# Patient Record
Sex: FEMALE | Race: WHITE | Employment: PART TIME | URBAN - METROPOLITAN AREA
[De-identification: names, ages, dates, MRNs, and addresses within clinical notes are randomized per-mention and may not be internally consistent; named-entity substitution may affect disease eponyms.]

---

## 2018-05-21 ENCOUNTER — OFFICE VISIT (OUTPATIENT)
Dept: MEDICAL GROUP | Facility: PHYSICIAN GROUP | Age: 73
End: 2018-05-21
Payer: MEDICARE

## 2018-05-21 VITALS
HEART RATE: 60 BPM | HEIGHT: 61 IN | DIASTOLIC BLOOD PRESSURE: 80 MMHG | OXYGEN SATURATION: 94 % | RESPIRATION RATE: 12 BRPM | TEMPERATURE: 97.6 F | SYSTOLIC BLOOD PRESSURE: 138 MMHG

## 2018-05-21 DIAGNOSIS — E78.2 MIXED HYPERLIPIDEMIA: ICD-10-CM

## 2018-05-21 DIAGNOSIS — I10 BENIGN ESSENTIAL HTN: ICD-10-CM

## 2018-05-21 DIAGNOSIS — E11.9 CONTROLLED TYPE 2 DIABETES MELLITUS WITHOUT COMPLICATION, WITHOUT LONG-TERM CURRENT USE OF INSULIN (HCC): ICD-10-CM

## 2018-05-21 DIAGNOSIS — I15.9 SECONDARY HYPERTENSION: ICD-10-CM

## 2018-05-21 PROCEDURE — 99203 OFFICE O/P NEW LOW 30 MIN: CPT | Performed by: FAMILY MEDICINE

## 2018-05-21 RX ORDER — ATENOLOL 50 MG/1
100 TABLET ORAL DAILY
Qty: 90 TAB | Refills: 1 | Status: SHIPPED | OUTPATIENT
Start: 2018-05-21 | End: 2018-05-21 | Stop reason: SDUPTHER

## 2018-05-21 RX ORDER — AMLODIPINE BESYLATE 10 MG/1
TABLET ORAL
COMMUNITY
Start: 2018-04-25 | End: 2018-07-17 | Stop reason: SDUPTHER

## 2018-05-21 RX ORDER — ATENOLOL 50 MG/1
100 TABLET ORAL DAILY
Qty: 90 TAB | Refills: 1 | Status: SHIPPED | OUTPATIENT
Start: 2018-05-21 | End: 2018-07-17 | Stop reason: SDUPTHER

## 2018-05-21 RX ORDER — PRAVASTATIN SODIUM 10 MG
TABLET ORAL
COMMUNITY
Start: 2018-04-25 | End: 2018-07-17 | Stop reason: SDUPTHER

## 2018-05-21 RX ORDER — LISINOPRIL 40 MG/1
TABLET ORAL
COMMUNITY
Start: 2018-04-25 | End: 2018-07-17 | Stop reason: SDUPTHER

## 2018-05-21 RX ORDER — GLIMEPIRIDE 4 MG/1
TABLET ORAL
COMMUNITY
Start: 2018-04-25 | End: 2018-07-17 | Stop reason: SDUPTHER

## 2018-05-21 ASSESSMENT — ENCOUNTER SYMPTOMS
HEADACHES: 0
NEUROLOGICAL NEGATIVE: 1
NECK PAIN: 0
PSYCHIATRIC NEGATIVE: 1
MUSCULOSKELETAL NEGATIVE: 1
HEMOPTYSIS: 0
RESPIRATORY NEGATIVE: 1
PALPITATIONS: 0
CONSTIPATION: 0
COUGH: 0
GASTROINTESTINAL NEGATIVE: 1
DIZZINESS: 0
CONSTITUTIONAL NEGATIVE: 1
CARDIOVASCULAR NEGATIVE: 1
FEVER: 0
CHILLS: 0
MYALGIAS: 0
EYES NEGATIVE: 1

## 2018-05-21 ASSESSMENT — PATIENT HEALTH QUESTIONNAIRE - PHQ9: CLINICAL INTERPRETATION OF PHQ2 SCORE: 0

## 2018-05-21 NOTE — LETTER
[facility]  Gonzalo Rodriguez M.D.  [address]  [facility] [address]  Fax: [phone]   Authorization for Release/Disclosure of   Protected Health Information   Name: ZENAIDA LUISENEYLAILA : 1945 SSN: [id number]   Address: [address] [place] [address]   [facility] [address] Phone:    [phone] (home)    I authorize the entity listed below to release/disclose the PHI below to:   [facility]/Gonzalo Rodriguez M.D. and Gonzalo Rodriguez M.D.   Provider or Entity Name:     Address   City, State, [facility]   Phone:      Fax:     Reason for request: continuity of care   Information to be released:    [  ] LAST COLONOSCOPY,  including any PATH REPORT and follow-up  [  ] LAST FIT/COLOGUARD RESULT [  ] LAST DEXA  [  ] LAST MAMMOGRAM  [  ] LAST PAP  [  ] LAST LABS [  ] RETINA EXAM REPORT  [  ] IMMUNIZATION RECORDS  [ X ] Release all info      [  ] Check here and initial the line next to each item to release ALL health information INCLUDING  _____ Care and treatment for drug and / or alcohol abuse  _____ HIV testing, infection status, or AIDS  _____ Genetic Testing    DATES OF SERVICE OR TIME PERIOD TO BE DISCLOSED: _____________  I understand and acknowledge that:  * This Authorization may be revoked at any time by you in writing, except if your health information has already been used or disclosed.  * Your health information that will be used or disclosed as a result of you signing this authorization could be re-disclosed by the recipient. If this occurs, your re-disclosed health information may no longer be protected by State or Federal laws.  * You may refuse to sign this Authorization. Your refusal will not affect your ability to obtain treatment.  * This Authorization becomes effective upon signing and will  on (date) __________.      If no date is indicated, this Authorization will  one (1) year from the signature date.    Name: Zenaida LuisCarol    Signature:   Date:     2018            PLEASE FAX REQUESTED RECORDS BACK TO: [phone]

## 2018-05-21 NOTE — PROGRESS NOTES
Subjective:      Zenaida Rios is a 72 y.o. female who presents with Diabetes            New patient visit, well controlled dm and htn ,needs new labs and ophth referral    1. Controlled type 2 diabetes mellitus without complication, without long-term current use of insulin (HCC)  Currently treated for DM, taking meds and checking bs at home, trying to do DM diet.controlled    - aspirin EC (ECOTRIN) 81 MG Tablet Delayed Response; Take 81 mg by mouth every day.  - glimepiride (AMARYL) 4 MG Tab;   - metFORMIN (GLUCOPHAGE) 850 MG Tab;   - amLODIPine (NORVASC) 10 MG Tab;   - lisinopril (PRINIVIL, ZESTRIL) 40 MG tablet;   - pravastatin (PRAVACHOL) 10 MG Tab;   - glucose blood strip; 1 Each by Other route as needed.  - COMP METABOLIC PANEL; Future  - FREE THYROXINE; Future  - HEMOGLOBIN A1C; Future  - LIPID PROFILE; Future  - MICROALBUMIN 24 HR URINE; Future  - TRIIDOTHYRONINE; Future  - TSH; Future  - VITAMIN D,25 HYDROXY; Future  - CBC WITHOUT DIFFERENTIAL; Future  - atenolol (TENORMIN) 50 MG Tab; Take 2 Tabs by mouth every day.  Dispense: 90 Tab; Refill: 1  - REFERRAL TO OPHTHALMOLOGY    2. Benign essential HTN  Currently treated for HTN, taking meds with no CP or sob, monitors bp at home periodically. controlled    - aspirin EC (ECOTRIN) 81 MG Tablet Delayed Response; Take 81 mg by mouth every day.  - glimepiride (AMARYL) 4 MG Tab;   - metFORMIN (GLUCOPHAGE) 850 MG Tab;   - amLODIPine (NORVASC) 10 MG Tab;   - lisinopril (PRINIVIL, ZESTRIL) 40 MG tablet;   - pravastatin (PRAVACHOL) 10 MG Tab;   - glucose blood strip; 1 Each by Other route as needed.  - COMP METABOLIC PANEL; Future  - FREE THYROXINE; Future  - HEMOGLOBIN A1C; Future  - LIPID PROFILE; Future  - MICROALBUMIN 24 HR URINE; Future  - TRIIDOTHYRONINE; Future  - TSH; Future  - VITAMIN D,25 HYDROXY; Future  - CBC WITHOUT DIFFERENTIAL; Future  - atenolol (TENORMIN) 50 MG Tab; Take 2 Tabs by mouth every day.  Dispense: 90 Tab; Refill: 1    3. Mixed  hyperlipidemia  Currently treated for HLD, taking meds with no new myalgias or joint pain, watching fats in diet  controlled    - aspirin EC (ECOTRIN) 81 MG Tablet Delayed Response; Take 81 mg by mouth every day.  - glimepiride (AMARYL) 4 MG Tab;   - metFORMIN (GLUCOPHAGE) 850 MG Tab;   - amLODIPine (NORVASC) 10 MG Tab;   - lisinopril (PRINIVIL, ZESTRIL) 40 MG tablet;   - pravastatin (PRAVACHOL) 10 MG Tab;   - glucose blood strip; 1 Each by Other route as needed.  - COMP METABOLIC PANEL; Future  - FREE THYROXINE; Future  - HEMOGLOBIN A1C; Future  - LIPID PROFILE; Future  - MICROALBUMIN 24 HR URINE; Future  - TRIIDOTHYRONINE; Future  - TSH; Future  - VITAMIN D,25 HYDROXY; Future  - CBC WITHOUT DIFFERENTIAL; Future  - atenolol (TENORMIN) 50 MG Tab; Take 2 Tabs by mouth every day.  Dispense: 90 Tab; Refill: 1    Past Medical History:  No date: Anesthesia  No date: Backpain  No date: Dental disorder  No date: Diabetes  No date: Heart murmur  No date: Hypertension  No date: Pain  Past Surgical History:  1/11/2010: LUMBAR FUSION POSTERIOR      Comment: Performed by LYNNETTE SMILEY at SURGERY [facility]S  1/11/2010: LAMINOTOMY      Comment: Performed by LYNNETTE SMILEY at SURGERY [facility] ORS  8/31/2009: LUMBAR LAMINECTOMY DISKECTOMY      Comment: Performed by LYNNETTE SMILEY at SURGERY [facility]S  1989: OTHER ORTHOPEDIC SURGERY      Comment: tibia has a tanna knee to ankle, fibula is                resected  1988: PRIMARY C SECTION  1975: OTHER      Comment: left lumpectomt in breast - benign  No date: OTHER  Smoking status: Never Smoker                                                              Smokeless tobacco: Never Used                      Alcohol use: No              History reviewed.  No pertinent family history.      Current Outpatient Prescriptions: •  aspirin EC (ECOTRIN) 81 MG Tablet Delayed Response, Take 81 mg by mouth every day., Disp: , Rfl: •   glimepiride (AMARYL) 4 MG Tab, , Disp: , Rfl: •  metFORMIN (GLUCOPHAGE) 850 MG Tab, , Disp: , Rfl: •  lisinopril (PRINIVIL, ZESTRIL) 40 MG tablet, , Disp: , Rfl: •  pravastatin (PRAVACHOL) 10 MG Tab, , Disp: , Rfl: •  glucose blood strip, 1 Each by Other route as needed., Disp: , Rfl: •  atenolol (TENORMIN) 50 MG Tab, Take 2 Tabs by mouth every day., Disp: 90 Tab, Rfl: 1•  gabapentin (NEURONTIN) 300 MG CAPS, Take 300 mg by mouth every bedtime., Disp: , Rfl: •  METFORMIN HCL 1000 MG (MOD) TB24, Take  by mouth 2 Times a Day., Disp: , Rfl:  •  amLODIPine (NORVASC) 10 MG Tab, , Disp: , Rfl: •  cyclobenzaprine (FLEXERIL) 10 MG TABS, Take 10 mg by mouth 3 times a day as needed for Mild Pain., Disp: , Rfl: •  enalapril (VASOTEC) 20 MG tablet, Take 20 mg by mouth every day., Disp: , Rfl: •  Ezetimibe (ZETIA PO), Take 15 mg by mouth every day., Disp: , Rfl: •  VICODIN ES PO, Take 1-2 Tabs by mouth every four hours as needed., Disp: , Rfl: •  ROBAXIN-750 750 MG TABS, Take 750 mg by mouth 3 times a day as needed., Disp: , Rfl: •  STOOL SOFTENER PO, Take 1 Tab by mouth every day., Disp: , Rfl: •  ACTOS 45 MG TABS, Take  by mouth every day., Disp: , Rfl: •  PROCARDIA XL 60 MG TB24, Take  by mouth every day., Disp: , Rfl:     Patient was instructed on the use of medications, either prescriptions or OTC and informed on when the appropriate follow up time period should be. In addition, patient was also instructed that should any acute worsening occur that they should notify this clinic asap or call 911.            Review of Systems   Constitutional: Negative.  Negative for chills and fever.        Past Medical History:  No date: Anesthesia  No date: Backpain  No date: Dental disorder  No date: Diabetes  No date: Heart murmur  No date: Hypertension  No date: Pain  Past Surgical History:  1/11/2010: LUMBAR FUSION POSTERIOR      Comment: Performed by LYNNETTE SMILEY at [facility]  1/11/2010: LAMINOTOMY       "Comment: Performed by LYNNETTE SMILEY at SURGERY [facility]  8/31/2009: LUMBAR LAMINECTOMY DISKECTOMY      Comment: Performed by LYNNETTE SMILEY at SURGERY [facility]  1989: OTHER ORTHOPEDIC SURGERY      Comment: tibia has a tanna knee to ankle, fibula is                resected  1988: PRIMARY C SECTION  1975: OTHER      Comment: left lumpectomt in breast - benign  No date: OTHER  Smoking status: Never Smoker                                                              Smokeless tobacco: Never Used                      Alcohol use: No              History reviewed.  No pertinent family history.     HENT: Negative.    Eyes: Negative.    Respiratory: Negative.  Negative for cough and hemoptysis.    Cardiovascular: Negative.  Negative for chest pain and palpitations.   Gastrointestinal: Negative.  Negative for constipation.   Genitourinary: Negative.  Negative for dysuria and urgency.   Musculoskeletal: Negative.  Negative for myalgias and neck pain.   Skin: Negative.  Negative for rash.   Neurological: Negative.  Negative for dizziness and headaches.   Endo/Heme/Allergies: Negative.    Psychiatric/Behavioral: Negative.  Negative for suicidal ideas.          Objective:     /80   Pulse 60   Temp 36.4 °C (97.6 °F)   Resp 12   Ht 1.549 m (5' 1\")   SpO2 94%      Physical Exam   Constitutional: She is oriented to person, place, and time. She appears well-developed and well-nourished. No distress.   HENT:   Head: Normocephalic and atraumatic.   Mouth/Throat: Oropharynx is clear and moist. No oropharyngeal exudate.   Eyes: Pupils are equal, round, and reactive to light.   Cardiovascular: Normal rate, regular rhythm, normal heart sounds and intact distal pulses.  Exam reveals no gallop and no friction rub.    No murmur heard.  Pulmonary/Chest: Effort normal and breath sounds normal. No respiratory distress. She has no wheezes. She has no rales. She exhibits no tenderness. "   Neurological: She is alert and oriented to person, place, and time.   Skin: She is not diaphoretic.   Psychiatric: She has a normal mood and affect. Her behavior is normal. Judgment and thought content normal.   Nursing note and vitals reviewed.              Assessment/Plan:     1. Controlled type 2 diabetes mellitus without complication, without long-term current use of insulin (HCC)    - aspirin EC (ECOTRIN) 81 MG Tablet Delayed Response; Take 81 mg by mouth every day.  - glimepiride (AMARYL) 4 MG Tab;   - metFORMIN (GLUCOPHAGE) 850 MG Tab;   - amLODIPine (NORVASC) 10 MG Tab;   - lisinopril (PRINIVIL, ZESTRIL) 40 MG tablet;   - pravastatin (PRAVACHOL) 10 MG Tab;   - glucose blood strip; 1 Each by Other route as needed.  - COMP METABOLIC PANEL; Future  - FREE THYROXINE; Future  - HEMOGLOBIN A1C; Future  - LIPID PROFILE; Future  - MICROALBUMIN 24 HR URINE; Future  - TRIIDOTHYRONINE; Future  - TSH; Future  - VITAMIN D,25 HYDROXY; Future  - CBC WITHOUT DIFFERENTIAL; Future  - atenolol (TENORMIN) 50 MG Tab; Take 2 Tabs by mouth every day.  Dispense: 90 Tab; Refill: 1  - REFERRAL TO OPHTHALMOLOGY    2. Benign essential HTN    - aspirin EC (ECOTRIN) 81 MG Tablet Delayed Response; Take 81 mg by mouth every day.  - glimepiride (AMARYL) 4 MG Tab;   - metFORMIN (GLUCOPHAGE) 850 MG Tab;   - amLODIPine (NORVASC) 10 MG Tab;   - lisinopril (PRINIVIL, ZESTRIL) 40 MG tablet;   - pravastatin (PRAVACHOL) 10 MG Tab;   - glucose blood strip; 1 Each by Other route as needed.  - COMP METABOLIC PANEL; Future  - FREE THYROXINE; Future  - HEMOGLOBIN A1C; Future  - LIPID PROFILE; Future  - MICROALBUMIN 24 HR URINE; Future  - TRIIDOTHYRONINE; Future  - TSH; Future  - VITAMIN D,25 HYDROXY; Future  - CBC WITHOUT DIFFERENTIAL; Future  - atenolol (TENORMIN) 50 MG Tab; Take 2 Tabs by mouth every day.  Dispense: 90 Tab; Refill: 1    3. Mixed hyperlipidemia    - aspirin EC (ECOTRIN) 81 MG Tablet Delayed Response; Take 81 mg by mouth every day.  -  glimepiride (AMARYL) 4 MG Tab;   - metFORMIN (GLUCOPHAGE) 850 MG Tab;   - amLODIPine (NORVASC) 10 MG Tab;   - lisinopril (PRINIVIL, ZESTRIL) 40 MG tablet;   - pravastatin (PRAVACHOL) 10 MG Tab;   - glucose blood strip; 1 Each by Other route as needed.  - COMP METABOLIC PANEL; Future  - FREE THYROXINE; Future  - HEMOGLOBIN A1C; Future  - LIPID PROFILE; Future  - MICROALBUMIN 24 HR URINE; Future  - TRIIDOTHYRONINE; Future  - TSH; Future  - VITAMIN D,25 HYDROXY; Future  - CBC WITHOUT DIFFERENTIAL; Future  - atenolol (TENORMIN) 50 MG Tab; Take 2 Tabs by mouth every day.  Dispense: 90 Tab; Refill: 1

## 2018-07-09 ENCOUNTER — HOSPITAL ENCOUNTER (OUTPATIENT)
Dept: LAB | Facility: MEDICAL CENTER | Age: 73
End: 2018-07-09
Attending: FAMILY MEDICINE
Payer: MEDICARE

## 2018-07-09 DIAGNOSIS — I10 BENIGN ESSENTIAL HTN: ICD-10-CM

## 2018-07-09 DIAGNOSIS — E11.9 CONTROLLED TYPE 2 DIABETES MELLITUS WITHOUT COMPLICATION, WITHOUT LONG-TERM CURRENT USE OF INSULIN (HCC): ICD-10-CM

## 2018-07-09 DIAGNOSIS — E78.2 MIXED HYPERLIPIDEMIA: ICD-10-CM

## 2018-07-09 LAB
25(OH)D3 SERPL-MCNC: 15 NG/ML (ref 30–100)
ERYTHROCYTE [DISTWIDTH] IN BLOOD BY AUTOMATED COUNT: 42.5 FL (ref 35.9–50)
EST. AVERAGE GLUCOSE BLD GHB EST-MCNC: 203 MG/DL
HBA1C MFR BLD: 8.7 % (ref 0–5.6)
HCT VFR BLD AUTO: 44.3 % (ref 37–47)
HGB BLD-MCNC: 14.6 G/DL (ref 12–16)
MCH RBC QN AUTO: 30.4 PG (ref 27–33)
MCHC RBC AUTO-ENTMCNC: 33 G/DL (ref 33.6–35)
MCV RBC AUTO: 92.3 FL (ref 81.4–97.8)
PLATELET # BLD AUTO: 258 K/UL (ref 164–446)
PMV BLD AUTO: 10.2 FL (ref 9–12.9)
RBC # BLD AUTO: 4.8 M/UL (ref 4.2–5.4)
WBC # BLD AUTO: 6.2 K/UL (ref 4.8–10.8)

## 2018-07-09 PROCEDURE — 85027 COMPLETE CBC AUTOMATED: CPT

## 2018-07-09 PROCEDURE — 84439 ASSAY OF FREE THYROXINE: CPT

## 2018-07-09 PROCEDURE — 82306 VITAMIN D 25 HYDROXY: CPT | Mod: GA

## 2018-07-09 PROCEDURE — 84480 ASSAY TRIIODOTHYRONINE (T3): CPT

## 2018-07-09 PROCEDURE — 83036 HEMOGLOBIN GLYCOSYLATED A1C: CPT | Mod: GA

## 2018-07-09 PROCEDURE — 84443 ASSAY THYROID STIM HORMONE: CPT

## 2018-07-09 PROCEDURE — 36415 COLL VENOUS BLD VENIPUNCTURE: CPT

## 2018-07-09 PROCEDURE — 80061 LIPID PANEL: CPT

## 2018-07-09 PROCEDURE — 80053 COMPREHEN METABOLIC PANEL: CPT

## 2018-07-10 LAB
ALBUMIN SERPL BCP-MCNC: 3.5 G/DL (ref 3.2–4.9)
ALBUMIN/GLOB SERPL: 0.9 G/DL
ALP SERPL-CCNC: 40 U/L (ref 30–99)
ALT SERPL-CCNC: 28 U/L (ref 2–50)
ANION GAP SERPL CALC-SCNC: 12 MMOL/L (ref 0–11.9)
AST SERPL-CCNC: 24 U/L (ref 12–45)
BILIRUB SERPL-MCNC: 0.6 MG/DL (ref 0.1–1.5)
BUN SERPL-MCNC: 21 MG/DL (ref 8–22)
CALCIUM SERPL-MCNC: 9.3 MG/DL (ref 8.5–10.5)
CHLORIDE SERPL-SCNC: 104 MMOL/L (ref 96–112)
CHOLEST SERPL-MCNC: 172 MG/DL (ref 100–199)
CO2 SERPL-SCNC: 20 MMOL/L (ref 20–33)
CREAT SERPL-MCNC: 0.83 MG/DL (ref 0.5–1.4)
GLOBULIN SER CALC-MCNC: 3.7 G/DL (ref 1.9–3.5)
GLUCOSE SERPL-MCNC: 123 MG/DL (ref 65–99)
HDLC SERPL-MCNC: 32 MG/DL
LDLC SERPL CALC-MCNC: 88 MG/DL
POTASSIUM SERPL-SCNC: 4.1 MMOL/L (ref 3.6–5.5)
PROT SERPL-MCNC: 7.2 G/DL (ref 6–8.2)
SODIUM SERPL-SCNC: 136 MMOL/L (ref 135–145)
T3 SERPL-MCNC: 110.4 NG/DL (ref 60–181)
T4 FREE SERPL-MCNC: 0.79 NG/DL (ref 0.53–1.43)
TRIGL SERPL-MCNC: 262 MG/DL (ref 0–149)
TSH SERPL DL<=0.005 MIU/L-ACNC: 4.02 UIU/ML (ref 0.38–5.33)

## 2018-07-17 ENCOUNTER — OFFICE VISIT (OUTPATIENT)
Dept: MEDICAL GROUP | Facility: PHYSICIAN GROUP | Age: 73
End: 2018-07-17
Payer: MEDICARE

## 2018-07-17 VITALS
DIASTOLIC BLOOD PRESSURE: 82 MMHG | WEIGHT: 168 LBS | HEART RATE: 68 BPM | BODY MASS INDEX: 31.72 KG/M2 | RESPIRATION RATE: 12 BRPM | SYSTOLIC BLOOD PRESSURE: 160 MMHG | OXYGEN SATURATION: 97 % | TEMPERATURE: 97.2 F | HEIGHT: 61 IN

## 2018-07-17 DIAGNOSIS — E78.2 MIXED HYPERLIPIDEMIA: ICD-10-CM

## 2018-07-17 DIAGNOSIS — Z12.31 ENCOUNTER FOR SCREENING MAMMOGRAM FOR HIGH-RISK PATIENT: ICD-10-CM

## 2018-07-17 DIAGNOSIS — I10 BENIGN ESSENTIAL HTN: ICD-10-CM

## 2018-07-17 DIAGNOSIS — E11.9 CONTROLLED TYPE 2 DIABETES MELLITUS WITHOUT COMPLICATION, WITHOUT LONG-TERM CURRENT USE OF INSULIN (HCC): ICD-10-CM

## 2018-07-17 DIAGNOSIS — E66.9 OBESITY (BMI 30-39.9): ICD-10-CM

## 2018-07-17 PROCEDURE — 99214 OFFICE O/P EST MOD 30 MIN: CPT | Performed by: FAMILY MEDICINE

## 2018-07-17 RX ORDER — GLIMEPIRIDE 4 MG/1
4 TABLET ORAL EVERY MORNING
Qty: 90 TAB | Refills: 1 | Status: CANCELLED | OUTPATIENT
Start: 2018-07-17

## 2018-07-17 RX ORDER — LISINOPRIL 40 MG/1
40 TABLET ORAL DAILY
Qty: 90 TAB | Refills: 1 | Status: SHIPPED | OUTPATIENT
Start: 2018-07-17 | End: 2018-10-16 | Stop reason: SDUPTHER

## 2018-07-17 RX ORDER — PRAVASTATIN SODIUM 10 MG
10 TABLET ORAL DAILY
Qty: 90 TAB | Refills: 1 | Status: SHIPPED | OUTPATIENT
Start: 2018-07-17 | End: 2018-10-16 | Stop reason: SDUPTHER

## 2018-07-17 RX ORDER — ATENOLOL 50 MG/1
100 TABLET ORAL DAILY
Qty: 90 TAB | Refills: 1 | Status: SHIPPED | OUTPATIENT
Start: 2018-07-17 | End: 2018-10-16 | Stop reason: SDUPTHER

## 2018-07-17 RX ORDER — AMLODIPINE BESYLATE 10 MG/1
10 TABLET ORAL DAILY
Qty: 90 TAB | Refills: 1 | Status: SHIPPED | OUTPATIENT
Start: 2018-07-17 | End: 2018-10-16 | Stop reason: SDUPTHER

## 2018-07-17 RX ORDER — GLIMEPIRIDE 4 MG/1
4 TABLET ORAL 2 TIMES DAILY
Qty: 180 TAB | Refills: 1 | Status: SHIPPED | OUTPATIENT
Start: 2018-07-17 | End: 2018-10-16 | Stop reason: SDUPTHER

## 2018-07-17 ASSESSMENT — ENCOUNTER SYMPTOMS
DOUBLE VISION: 0
CONSTITUTIONAL NEGATIVE: 1
GASTROINTESTINAL NEGATIVE: 1
NAUSEA: 0
HEADACHES: 0
BRUISES/BLEEDS EASILY: 0
MYALGIAS: 0
BLURRED VISION: 0
EYES NEGATIVE: 1
TINGLING: 0
CARDIOVASCULAR NEGATIVE: 1
HEARTBURN: 0
MUSCULOSKELETAL NEGATIVE: 1
PALPITATIONS: 0
FEVER: 0
DIZZINESS: 0
HEMOPTYSIS: 0
CHILLS: 0
RESPIRATORY NEGATIVE: 1
DEPRESSION: 0
COUGH: 0
PSYCHIATRIC NEGATIVE: 1
NEUROLOGICAL NEGATIVE: 1

## 2018-07-17 NOTE — PROGRESS NOTES
Subjective:      Zenaida Rios is a 72 y.o. female who presents with Diabetes            1. Encounter for screening mammogram for high-risk patient    - [id number]; Future    2. Controlled type 2 diabetes mellitus without complication, without long-term current use of insulin (HCC)  a1c not at target at 8.4  Will increase amaryl to bid and then recheck in 3 mo with poct a1c  - glimepiride (AMARYL) 4 MG Tab; Take 1 Tab by mouth 2 times a day.  Dispense: 180 Tab; Refill: 1    3. Benign essential HTN  Currently treated for HTN, taking meds with no CP or sob, monitors bp at home periodically. controlled    - glimepiride (AMARYL) 4 MG Tab; Take 1 Tab by mouth 2 times a day.  Dispense: 180 Tab; Refill: 1    4. Mixed hyperlipidemia  At target ldl  - glimepiride (AMARYL) 4 MG Tab; Take 1 Tab by mouth 2 times a day.  Dispense: 180 Tab; Refill: 1    5. Obesity (BMI 30-39.9)    - Patient identified as having weight management issue.  Appropriate orders and counseling given.    Past Medical History:  No date: Anesthesia  No date: Backpain  No date: Dental disorder  No date: Diabetes  No date: Heart murmur  No date: Hypertension  No date: Pain  Past Surgical History:  1/11/2010: LUMBAR FUSION POSTERIOR      Comment: Performed by LYNNETTE SMILEY at SURGERY [facility] ORS  1/11/2010: LAMINOTOMY      Comment: Performed by LYNNETTE SMILEY at SURGERY [facility]  8/31/2009: LUMBAR LAMINECTOMY DISKECTOMY      Comment: Performed by LYNNETTE SMILEY at SURGERY [facility]  1989: OTHER ORTHOPEDIC SURGERY      Comment: tibia has a tanna knee to ankle, fibula is                resected  1988: PRIMARY C SECTION  1975: OTHER      Comment: left lumpectomt in breast - benign  No date: OTHER  Smoking status: Never Smoker                                                              Smokeless tobacco: Never Used                      Alcohol use: No              No family  history on file.      Current Outpatient Prescriptions: •  glimepiride (AMARYL) 4 MG Tab, Take 1 Tab by mouth 2 times a day., Disp: 180 Tab, Rfl: 1•  aspirin EC (ECOTRIN) 81 MG Tablet Delayed Response, Take 81 mg by mouth every day., Disp: , Rfl: •  amLODIPine (NORVASC) 10 MG Tab, Take 1 Tab by mouth every day., Disp: 90 Tab, Rfl: 1•  atenolol (TENORMIN) 50 MG Tab, Take 2 Tabs by mouth every day., Disp: 90 Tab, Rfl: 1•  pravastatin (PRAVACHOL) 10 MG Tab, Take 1 Tab by mouth every day., Disp: 90 Tab, Rfl: 1•  metFORMIN (GLUCOPHAGE) 850 MG Tab, Take 1 Tab by mouth 3 times a day., Disp: 180 Tab, Rfl: 1•  lisinopril (PRINIVIL, ZESTRIL) 40 MG tablet, Take 1 Tab by mouth every day., Disp: 90 Tab, Rfl: 1•  glucose blood strip, 1 Each by Other route as needed., Disp: , Rfl: •  gabapentin (NEURONTIN) 300 MG CAPS, Take 300 mg by mouth every bedtime., Disp: , Rfl: •  cyclobenzaprine (FLEXERIL) 10 MG Tab, Take 10 mg by mouth 3 times a day as needed for Mild Pain., Disp: , Rfl: •  enalapril (VASOTEC) 20 MG tablet, Take 20 mg by mouth every day., Disp: , Rfl: •  Ezetimibe (ZETIA PO), Take 15 mg by mouth every day., Disp: , Rfl: •  VICODIN ES PO, Take 1-2 Tabs by mouth every four hours as needed., Disp: , Rfl: •  ROBAXIN-750 750 MG TABS, Take 750 mg by mouth 3 times a day as needed., Disp: , Rfl: •  STOOL SOFTENER PO, Take 1 Tab by mouth every day., Disp: , Rfl: •  METFORMIN HCL 1000 MG (MOD) TB24, Take  by mouth 2 Times a Day., Disp: , Rfl:  •  ACTOS 45 MG TABS, Take  by mouth every day., Disp: , Rfl: •  PROCARDIA XL 60 MG TB24, Take  by mouth every day., Disp: , Rfl:     Patient was instructed on the use of medications, either prescriptions or OTC and informed on when the appropriate follow up time period should be. In addition, patient was also instructed that should any acute worsening occur that they should notify this clinic asap or call 911.            Review of Systems   Constitutional: Negative.  Negative for chills and  "fever.   HENT: Negative.  Negative for hearing loss.    Eyes: Negative.  Negative for blurred vision and double vision.   Respiratory: Negative.  Negative for cough and hemoptysis.    Cardiovascular: Negative.  Negative for chest pain and palpitations.   Gastrointestinal: Negative.  Negative for heartburn and nausea.   Genitourinary: Negative.  Negative for dysuria.   Musculoskeletal: Negative.  Negative for myalgias.   Skin: Negative.  Negative for rash.   Neurological: Negative.  Negative for dizziness, tingling and headaches.   Endo/Heme/Allergies: Negative.  Does not bruise/bleed easily.   Psychiatric/Behavioral: Negative.  Negative for depression and suicidal ideas.   All other systems reviewed and are negative.         Objective:     /82   Pulse 68   Temp 36.2 °C (97.2 °F)   Resp 12   Ht 1.549 m (5' 1\")   Wt 76.2 kg (168 lb)   SpO2 97%   BMI 31.74 kg/m²      Physical Exam   Constitutional: She is oriented to person, place, and time. She appears well-developed and well-nourished. No distress.   HENT:   Head: Normocephalic and atraumatic.   Mouth/Throat: Oropharynx is clear and moist. No oropharyngeal exudate.   Eyes: Pupils are equal, round, and reactive to light.   Cardiovascular: Normal rate, regular rhythm, normal heart sounds and intact distal pulses.  Exam reveals no gallop and no friction rub.    No murmur heard.  Pulmonary/Chest: Effort normal and breath sounds normal. No respiratory distress. She has no wheezes. She has no rales. She exhibits no tenderness.   Neurological: She is alert and oriented to person, place, and time.   Skin: She is not diaphoretic.   Psychiatric: She has a normal mood and affect. Her behavior is normal. Judgment and thought content normal.   Nursing note and vitals reviewed.              Assessment/Plan:     1. Encounter for screening mammogram for high-risk patient    - [id number]; Future    2. Controlled type 2 diabetes mellitus without complication, " without long-term current use of insulin (HCC)    - glimepiride (AMARYL) 4 MG Tab; Take 1 Tab by mouth 2 times a day.  Dispense: 180 Tab; Refill: 1    3. Benign essential HTN    - glimepiride (AMARYL) 4 MG Tab; Take 1 Tab by mouth 2 times a day.  Dispense: 180 Tab; Refill: 1    4. Mixed hyperlipidemia    - glimepiride (AMARYL) 4 MG Tab; Take 1 Tab by mouth 2 times a day.  Dispense: 180 Tab; Refill: 1    5. Obesity (BMI 30-39.9)    - Patient identified as having weight management issue.  Appropriate orders and counseling given.

## 2018-07-31 DIAGNOSIS — E11.9 CONTROLLED TYPE 2 DIABETES MELLITUS WITHOUT COMPLICATION, WITHOUT LONG-TERM CURRENT USE OF INSULIN (HCC): ICD-10-CM

## 2018-07-31 DIAGNOSIS — Z12.31 VISIT FOR SCREENING MAMMOGRAM: ICD-10-CM

## 2018-10-16 ENCOUNTER — OFFICE VISIT (OUTPATIENT)
Dept: MEDICAL GROUP | Facility: PHYSICIAN GROUP | Age: 73
End: 2018-10-16
Payer: MEDICARE

## 2018-10-16 VITALS
DIASTOLIC BLOOD PRESSURE: 80 MMHG | RESPIRATION RATE: 14 BRPM | OXYGEN SATURATION: 95 % | HEIGHT: 61 IN | TEMPERATURE: 97.4 F | SYSTOLIC BLOOD PRESSURE: 140 MMHG | HEART RATE: 66 BPM

## 2018-10-16 DIAGNOSIS — I10 BENIGN ESSENTIAL HTN: ICD-10-CM

## 2018-10-16 DIAGNOSIS — E11.9 CONTROLLED TYPE 2 DIABETES MELLITUS WITHOUT COMPLICATION, WITHOUT LONG-TERM CURRENT USE OF INSULIN (HCC): ICD-10-CM

## 2018-10-16 DIAGNOSIS — Z23 NEED FOR INFLUENZA VACCINATION: ICD-10-CM

## 2018-10-16 DIAGNOSIS — E78.2 MIXED HYPERLIPIDEMIA: ICD-10-CM

## 2018-10-16 DIAGNOSIS — Z23 NEED FOR TDAP VACCINATION: ICD-10-CM

## 2018-10-16 PROCEDURE — 90472 IMMUNIZATION ADMIN EACH ADD: CPT | Performed by: FAMILY MEDICINE

## 2018-10-16 PROCEDURE — 90662 IIV NO PRSV INCREASED AG IM: CPT | Performed by: FAMILY MEDICINE

## 2018-10-16 PROCEDURE — 90715 TDAP VACCINE 7 YRS/> IM: CPT | Performed by: FAMILY MEDICINE

## 2018-10-16 PROCEDURE — G0008 ADMIN INFLUENZA VIRUS VAC: HCPCS | Performed by: FAMILY MEDICINE

## 2018-10-16 PROCEDURE — 99214 OFFICE O/P EST MOD 30 MIN: CPT | Mod: 25 | Performed by: FAMILY MEDICINE

## 2018-10-16 RX ORDER — AMLODIPINE BESYLATE 10 MG/1
10 TABLET ORAL DAILY
Qty: 90 TAB | Refills: 1 | Status: SHIPPED | OUTPATIENT
Start: 2018-10-16 | End: 2019-01-17 | Stop reason: SDUPTHER

## 2018-10-16 RX ORDER — PRAVASTATIN SODIUM 10 MG
10 TABLET ORAL DAILY
Qty: 90 TAB | Refills: 1 | Status: SHIPPED | OUTPATIENT
Start: 2018-10-16 | End: 2019-01-17 | Stop reason: SDUPTHER

## 2018-10-16 RX ORDER — ATENOLOL 50 MG/1
100 TABLET ORAL DAILY
Qty: 90 TAB | Refills: 1 | Status: SHIPPED | OUTPATIENT
Start: 2018-10-16 | End: 2019-01-17 | Stop reason: SDUPTHER

## 2018-10-16 RX ORDER — LISINOPRIL 40 MG/1
40 TABLET ORAL DAILY
Qty: 90 TAB | Refills: 1 | Status: SHIPPED | OUTPATIENT
Start: 2018-10-16 | End: 2019-01-17 | Stop reason: SDUPTHER

## 2018-10-16 RX ORDER — GLIMEPIRIDE 4 MG/1
4 TABLET ORAL 2 TIMES DAILY
Qty: 180 TAB | Refills: 1 | Status: SHIPPED | OUTPATIENT
Start: 2018-10-16 | End: 2019-01-17 | Stop reason: SDUPTHER

## 2018-10-16 RX ORDER — METFORMIN HYDROCHLORIDE 1000 MG/1
1000 TABLET, FILM COATED, EXTENDED RELEASE ORAL 2 TIMES DAILY
Qty: 180 TAB | Refills: 1 | Status: SHIPPED | OUTPATIENT
Start: 2018-10-16 | End: 2019-01-17

## 2018-10-16 ASSESSMENT — ENCOUNTER SYMPTOMS
CONSTITUTIONAL NEGATIVE: 1
NAUSEA: 0
BLURRED VISION: 0
PSYCHIATRIC NEGATIVE: 1
MYALGIAS: 0
RESPIRATORY NEGATIVE: 1
NEUROLOGICAL NEGATIVE: 1
MUSCULOSKELETAL NEGATIVE: 1
HEMOPTYSIS: 0
DIZZINESS: 0
PALPITATIONS: 0
CHILLS: 0
HEARTBURN: 0
TINGLING: 0
DOUBLE VISION: 0
CARDIOVASCULAR NEGATIVE: 1
GASTROINTESTINAL NEGATIVE: 1
HEADACHES: 0
DEPRESSION: 0
BRUISES/BLEEDS EASILY: 0
COUGH: 0
FEVER: 0
EYES NEGATIVE: 1

## 2018-10-16 NOTE — PROGRESS NOTES
Subjective:      Zenaida Rios is a 72 y.o. female who presents with Diabetes            1. Need for influenza vaccination    - INFLUENZA VACCINE, HIGH DOSE (65+ ONLY)    2. Need for Tdap vaccination    - TDAP VACCINE =>6YO IM    3. Controlled type 2 diabetes mellitus without complication, without long-term current use of insulin (HCC)  Not improved with max dose of metformin and amaryl  Does not want to take actos  Will send to [facility] for other options  She continues to have a very defeatist attitude regarding her DM and refuses many other kinds of treatments (insulin/dietician)   - REFERRAL TO ENDOCRINOLOGY    4. Benign essential HTN  Currently treated for HTN, taking meds with no CP or sob, monitors bp at home periodically. controlled      5. Mixed hyperlipidemia  Currently treated for HLD, taking meds with no new myalgias or joint pain, watching fats in diet  controlled      Past Medical History:  No date: Anesthesia  No date: Backpain  No date: Dental disorder  No date: Diabetes  No date: Heart murmur  No date: Hypertension  No date: Pain  Past Surgical History:  1/11/2010: LUMBAR FUSION POSTERIOR      Comment:  Performed by LYNNETTE SMILEY at SURGERY [facility]S  1/11/2010: LAMINOTOMY      Comment:  Performed by LYNNETTE SMILEY at SURGERY [facility]  8/31/2009: LUMBAR LAMINECTOMY DISKECTOMY      Comment:  Performed by LYNNETTE SMILEY at SURGERY [facility] OR[facility]  1989: OTHER ORTHOPEDIC SURGERY      Comment:  tibia has a tanna knee to ankle, fibula is resected  1988: PRIMARY C SECTION  1975: OTHER      Comment:  left lumpectomt in breast - benign  No date: OTHER  Smoking status: Never Smoker                                                              Smokeless tobacco: Never Used                      Alcohol use: No              No family history on file.      Current Outpatient Prescriptions: •  metFORMIN (GLUCOPHAGE) 850 MG Tab, Take 1 Tab by mouth 3 times a day., Disp: 180 Tab, Rfl: 1•   metformin ER modified (GLUMETZA) 1000 MG TABLET SR 24 HR, Take 1,000 Tabs by mouth 2 Times a Day., Disp: 180 Tab, Rfl: 1•  atenolol (TENORMIN) 50 MG Tab, Take 2 Tabs by mouth every day., Disp: 90 Tab, Rfl: 1•  glimepiride (AMARYL) 4 MG Tab, Take 1 Tab by mouth 2 times a day., Disp: 180 Tab, Rfl: 1•  pravastatin (PRAVACHOL) 10 MG Tab, Take 1 Tab by mouth every day., Disp: 90 Tab, Rfl: 1•  amLODIPine (NORVASC) 10 MG Tab, Take 1 Tab by mouth every day., Disp: 90 Tab, Rfl: 1•  lisinopril (PRINIVIL, ZESTRIL) 40 MG tablet, Take 1 Tab by mouth every day., Disp: 90 Tab, Rfl: 1•  aspirin EC (ECOTRIN) 81 MG Tablet Delayed Response, Take 81 mg by mouth every day., Disp: , Rfl: •  glucose blood strip, 1 Each by Other route as needed., Disp: , Rfl: •  gabapentin (NEURONTIN) 300 MG CAPS, Take 300 mg by mouth every bedtime., Disp: , Rfl: •  cyclobenzaprine (FLEXERIL) 10 MG Tab, Take 10 mg by mouth 3 times a day as needed for Mild Pain., Disp: , Rfl: •  enalapril (VASOTEC) 20 MG tablet, Take 20 mg by mouth every day., Disp: , Rfl: •  Ezetimibe (ZETIA PO), Take 15 mg by mouth every day., Disp: , Rfl: •  VICODIN ES PO, Take 1-2 Tabs by mouth every four hours as needed., Disp: , Rfl: •  ROBAXIN-750 750 MG TABS, Take 750 mg by mouth 3 times a day as needed., Disp: , Rfl: •  STOOL SOFTENER PO, Take 1 Tab by mouth every day., Disp: , Rfl: •  ACTOS 45 MG TABS, Take  by mouth every day., Disp: , Rfl: •  PROCARDIA XL 60 MG TB24, Take  by mouth every day., Disp: , Rfl:     Patient was instructed on the use of medications, either prescriptions or OTC and informed on when the appropriate follow up time period should be. In addition, patient was also instructed that should any acute worsening occur that they should notify this clinic asap or call 911.            Review of Systems   Constitutional: Negative.  Negative for chills and fever.   HENT: Negative.  Negative for hearing loss.    Eyes: Negative.  Negative for blurred vision and double  "vision.   Respiratory: Negative.  Negative for cough and hemoptysis.    Cardiovascular: Negative.  Negative for chest pain and palpitations.   Gastrointestinal: Negative.  Negative for heartburn and nausea.   Genitourinary: Negative.  Negative for dysuria.   Musculoskeletal: Negative.  Negative for myalgias.   Skin: Negative.  Negative for rash.   Neurological: Negative.  Negative for dizziness, tingling and headaches.   Endo/Heme/Allergies: Negative.  Does not bruise/bleed easily.   Psychiatric/Behavioral: Negative.  Negative for depression and suicidal ideas.   All other systems reviewed and are negative.         Objective:     /80 (BP Location: Left arm, Patient Position: Sitting)   Pulse 66   Temp 36.3 °C (97.4 °F)   Resp 14   Ht 1.549 m (5' 1\")   SpO2 95%      Physical Exam   Constitutional: She is oriented to person, place, and time. She appears well-developed and well-nourished. No distress.   HENT:   Head: Normocephalic and atraumatic.   Mouth/Throat: Oropharynx is clear and moist. No oropharyngeal exudate.   Eyes: Pupils are equal, round, and reactive to light.   Cardiovascular: Normal rate, regular rhythm, normal heart sounds and intact distal pulses.  Exam reveals no gallop and no friction rub.    No murmur heard.  Pulmonary/Chest: Effort normal and breath sounds normal. No respiratory distress. She has no wheezes. She has no rales. She exhibits no tenderness.   Neurological: She is alert and oriented to person, place, and time.   Skin: She is not diaphoretic.   Psychiatric: She has a normal mood and affect. Her behavior is normal. Judgment and thought content normal.   Nursing note and vitals reviewed.              Assessment/Plan:     1. Need for influenza vaccination    - INFLUENZA VACCINE, HIGH DOSE (65+ ONLY)    2. Need for Tdap vaccination    - TDAP VACCINE =>8YO IM    3. Controlled type 2 diabetes mellitus without complication, without long-term current use of insulin ([facility])    - REFERRAL " TO ENDOCRINOLOGY    4. Benign essential HTN      5. Mixed hyperlipidemia

## 2018-10-29 DIAGNOSIS — E78.2 MIXED HYPERLIPIDEMIA: ICD-10-CM

## 2018-10-29 DIAGNOSIS — I10 BENIGN ESSENTIAL HTN: ICD-10-CM

## 2018-10-29 DIAGNOSIS — E11.9 CONTROLLED TYPE 2 DIABETES MELLITUS WITHOUT COMPLICATION, WITHOUT LONG-TERM CURRENT USE OF INSULIN (HCC): ICD-10-CM

## 2018-10-29 NOTE — TELEPHONE ENCOUNTER
Was the patient seen in the last year in this department? Yes    Does patient have an active prescription for medications requested? Yes    Received Request Via: Patient        Last visit: 10/16/18  Last labs:7/9/18

## 2019-01-17 ENCOUNTER — OFFICE VISIT (OUTPATIENT)
Dept: MEDICAL GROUP | Facility: PHYSICIAN GROUP | Age: 74
End: 2019-01-17
Payer: MEDICARE

## 2019-01-17 VITALS
BODY MASS INDEX: 31.74 KG/M2 | HEIGHT: 61 IN | OXYGEN SATURATION: 98 % | RESPIRATION RATE: 14 BRPM | SYSTOLIC BLOOD PRESSURE: 148 MMHG | DIASTOLIC BLOOD PRESSURE: 94 MMHG | TEMPERATURE: 97.3 F | HEART RATE: 63 BPM

## 2019-01-17 DIAGNOSIS — G62.9 NEUROPATHY: ICD-10-CM

## 2019-01-17 DIAGNOSIS — I10 BENIGN ESSENTIAL HTN: ICD-10-CM

## 2019-01-17 DIAGNOSIS — E11.9 CONTROLLED TYPE 2 DIABETES MELLITUS WITHOUT COMPLICATION, WITHOUT LONG-TERM CURRENT USE OF INSULIN (HCC): ICD-10-CM

## 2019-01-17 DIAGNOSIS — E78.2 MIXED HYPERLIPIDEMIA: ICD-10-CM

## 2019-01-17 DIAGNOSIS — E11.65 UNCONTROLLED TYPE 2 DIABETES MELLITUS WITH HYPERGLYCEMIA (HCC): ICD-10-CM

## 2019-01-17 DIAGNOSIS — R73.9 HIGH BLOOD SUGAR: Primary | ICD-10-CM

## 2019-01-17 DIAGNOSIS — E55.9 VITAMIN D DEFICIENCY: ICD-10-CM

## 2019-01-17 LAB
HBA1C MFR BLD: 9 % (ref ?–5.8)
INT CON NEG: NORMAL
INT CON POS: NORMAL

## 2019-01-17 PROCEDURE — 83036 HEMOGLOBIN GLYCOSYLATED A1C: CPT | Performed by: FAMILY MEDICINE

## 2019-01-17 PROCEDURE — 99214 OFFICE O/P EST MOD 30 MIN: CPT | Performed by: FAMILY MEDICINE

## 2019-01-17 RX ORDER — GLIMEPIRIDE 4 MG/1
4 TABLET ORAL 2 TIMES DAILY
Qty: 180 TAB | Refills: 0 | Status: SHIPPED | OUTPATIENT
Start: 2019-01-17 | End: 2019-04-17

## 2019-01-17 RX ORDER — LISINOPRIL 40 MG/1
40 TABLET ORAL DAILY
Qty: 90 TAB | Refills: 1 | Status: SHIPPED | OUTPATIENT
Start: 2019-01-17 | End: 2019-07-11 | Stop reason: SDUPTHER

## 2019-01-17 RX ORDER — GABAPENTIN 300 MG/1
300 CAPSULE ORAL
Qty: 90 CAP | Refills: 1 | Status: SHIPPED | OUTPATIENT
Start: 2019-01-17 | End: 2019-07-11 | Stop reason: SDUPTHER

## 2019-01-17 RX ORDER — AMLODIPINE BESYLATE 10 MG/1
10 TABLET ORAL DAILY
Qty: 90 TAB | Refills: 1 | Status: SHIPPED | OUTPATIENT
Start: 2019-01-17 | End: 2019-07-11 | Stop reason: SDUPTHER

## 2019-01-17 RX ORDER — PRAVASTATIN SODIUM 10 MG
10 TABLET ORAL DAILY
Qty: 90 TAB | Refills: 0 | Status: SHIPPED | OUTPATIENT
Start: 2019-01-17 | End: 2019-04-11 | Stop reason: SDUPTHER

## 2019-01-17 RX ORDER — ATENOLOL 50 MG/1
100 TABLET ORAL DAILY
Qty: 180 TAB | Refills: 0 | Status: SHIPPED | OUTPATIENT
Start: 2019-01-17 | End: 2019-04-11 | Stop reason: SDUPTHER

## 2019-01-17 ASSESSMENT — PATIENT HEALTH QUESTIONNAIRE - PHQ9: CLINICAL INTERPRETATION OF PHQ2 SCORE: 0

## 2019-01-17 NOTE — PROGRESS NOTES
CC: diabetes    HISTORY OF PRESENT ILLNESS: Patient is a 73 y.o. 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  This was treated with surgery and improved but she has residual right leg symptoms.  She describes numbness in her right foot and ankle.  She was previously on gabapentin which helped.    Mixed hyperlipidemia  The 10-year ASCVD risk score (Angelygisselle CALDERON Jr., et al., 2013) is: 36.2%    Values used to calculate the score:      Age: 73 years      Sex: Female      Is Non- : No      Diabetic: Yes      Tobacco smoker: No      Systolic Blood Pressure: 148 mmHg      Is BP treated: Yes      HDL Cholesterol: 32 mg/dL      Total Cholesterol: 172 mg/dL     She is on pravastatin 10 mg nightly.  She did not tolerate higher intensity treatment due to myalgias.    Benign essential HTN  She reports she has had hypertension since her 20s.  She has been on medications since and reports all work up has been normal.  She reports her BP today is typical for her and that it is always in the range of 130-140s systolic.  She would like to keep her medications the same today.  She denies side effects.    Uncontrolled type 2 diabetes mellitus with hyperglycemia (HCC)  She is on maximum dose metformin and glimepiride for diabetes.  Her A1c remains above goal.  She is tolerating her current medications.    Lab Results   Component Value Date/Time    HBA1C 9.0 01/17/2019 08:15 AM    HBA1C 8.7 (H) 07/09/2018 07:10 AM         Vitamin D deficiency  She is taking vitamin D 1000 units daily.  Her last vitamin D level was 15 in 7/2018.      Patient Active Problem List    Diagnosis Date Noted   • Neuropathy (HCC) 01/17/2019   • Vitamin D deficiency 01/17/2019   • Obesity (BMI 30-39.9) 07/17/2018   • Mixed hyperlipidemia 05/21/2018    • Benign essential HTN 05/21/2018   • Uncontrolled type 2 diabetes mellitus with hyperglycemia (HCC) 05/21/2018      Allergies:Percocet [oxycodone-acetaminophen]; Demerol; and Statins [hmg-coa-r inhibitors]    Current Outpatient Prescriptions   Medication Sig Dispense Refill   • Empagliflozin 10 MG Tab Take 10 mg by mouth every day. 30 Tab 0   • vitamin D (CHOLECALCIFEROL) 1000 UNIT Tab Take 1,000 Units by mouth every day.     • gabapentin (NEURONTIN) 300 MG Cap Take 1 Cap by mouth every bedtime. 90 Cap 1   • amLODIPine (NORVASC) 10 MG Tab Take 1 Tab by mouth every day. 90 Tab 1   • atenolol (TENORMIN) 50 MG Tab Take 2 Tabs by mouth every day for 90 days. 180 Tab 0   • glimepiride (AMARYL) 4 MG Tab Take 1 Tab by mouth 2 times a day for 90 days. 180 Tab 0   • lisinopril (PRINIVIL, ZESTRIL) 40 MG tablet Take 1 Tab by mouth every day. 90 Tab 1   • metFORMIN (GLUCOPHAGE) 850 MG Tab Take 1 Tab by mouth 3 times a day for 90 days. 270 Tab 0   • pravastatin (PRAVACHOL) 10 MG Tab Take 1 Tab by mouth every day for 90 days. 90 Tab 0   • aspirin EC (ECOTRIN) 81 MG Tablet Delayed Response Take 81 mg by mouth every day.     • glucose blood strip 1 Each by Other route as needed.     • STOOL SOFTENER PO Take 1 Tab by mouth every day.       No current facility-administered medications for this visit.        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     - HEENT: Negative for headaches, vision changes, hearing changes, ear pain, ear discharge, rhinorrhea, sinus congestion, sore throat, and neck pain.      - Respiratory: Negative for cough, sputum production, chest  "congestion, dyspnea, wheezing, and crackles.      - Cardiovascular: Negative for chest pain, palpitations, orthopnea, and bilateral lower extremity edema.     - Gastrointestinal: Negative for heartburn, nausea, vomiting, abdominal pain, hematochezia, melena, diarrhea, constipation, and greasy/foul-smelling stools.     - Genitourinary: Negative for dysuria, polyuria, hematuria, pyuria, urinary urgency, and urinary incontinence.    - Musculoskeletal: Negative for myalgias, back pain, and joint pain.      - Skin: Negative for rash, itching, cyanotic skin color change.     - Neurological: Negative for focal weakness and headaches.     - Psychiatric/Behavioral: Negative for depression, suicidal/homicidal ideation and memory loss.        Exam:    Blood pressure 148/94, pulse 63, temperature 36.3 °C (97.3 °F), resp. rate 14, height 1.549 m (5' 1\"), SpO2 98 %. Body mass index is 31.74 kg/m².    General:  Well nourished, well developed female in NAD  Head is grossly normal.  Neck: Supple without JVD or bruit. Thyroid is not enlarged.  Pulmonary: Clear to ausculation and percussion.  Normal effort. No rales, ronchi, or wheezing.  Cardiovascular: Regular rate and rhythm without murmur. Carotid and radial pulses are intact and equal bilaterally.  Extremities: no clubbing, cyanosis, or edema.    Please note that this dictation was created using voice recognition software. I have made every reasonable attempt to correct obvious errors, but I expect that there are errors of grammar and possibly content that I did not discover before finalizing the note.    Assessment/Plan:  1. High blood sugar  Her HgA1c remains elevated on two oral diabetic medications.   - POCT Hemoglobin A1C  - REFERRAL TO PHARMACOTHERAPY SERVICE  - REFERRAL TO DIABETIC EDUCATION Diabetes Self Management Education / Training (DSME/T) and Medical Nutrition Therapy (MNT): Initial Group DSME/MNT as authorized by payor, Follow-Up DSME/MNT as authorized by payor; " DSME/T Content: Monitoring Diabete...    2. Uncontrolled type 2 diabetes mellitus with hyperglycemia (HCC)  Her HgA1c is not at goal for her.  Goal for her is still < 7 given her current functionality.  This should be attainable with a GLP-1 and SGLT2 inhibitor which I have discussed with her.  My goal would be to wean glimepiride and it may not be as effective as it previously was.  We discussed side effects of the new medications and use.  I have referred her to diabetic education and the pharmacy service for further education, samples and guidance in managing the diabetes.  We will continue to follow every 3 months.  - Empagliflozin 10 MG Tab; Take 10 mg by mouth every day.  Dispense: 30 Tab; Refill: 0  - HEMOGLOBIN A1C; Future    3. Benign essential HTN  We discussed her goal BP to be < 130/90.  This should improve with treatment of diabetes.  Labs are up to date and show normal kidney function.  For now we will continue her current 3 drug regimen.  - amLODIPine (NORVASC) 10 MG Tab; Take 1 Tab by mouth every day.  Dispense: 90 Tab; Refill: 1  - atenolol (TENORMIN) 50 MG Tab; Take 2 Tabs by mouth every day for 90 days.  Dispense: 180 Tab; Refill: 0  - glimepiride (AMARYL) 4 MG Tab; Take 1 Tab by mouth 2 times a day for 90 days.  Dispense: 180 Tab; Refill: 0  - lisinopril (PRINIVIL, ZESTRIL) 40 MG tablet; Take 1 Tab by mouth every day.  Dispense: 90 Tab; Refill: 1  - metFORMIN (GLUCOPHAGE) 850 MG Tab; Take 1 Tab by mouth 3 times a day for 90 days.  Dispense: 270 Tab; Refill: 0  - pravastatin (PRAVACHOL) 10 MG Tab; Take 1 Tab by mouth every day for 90 days.  Dispense: 90 Tab; Refill: 0    4. Mixed hyperlipidemia  Her current risk is high but her LDL is reasonable and I would not add additional therapies such as zetia.  Ongoing use of a statin is appropriate but higher intensity has not been tolerated.  Continue current dosing.   - amLODIPine (NORVASC) 10 MG Tab; Take 1 Tab by mouth every day.  Dispense: 90 Tab;  Refill: 1  - atenolol (TENORMIN) 50 MG Tab; Take 2 Tabs by mouth every day for 90 days.  Dispense: 180 Tab; Refill: 0  - glimepiride (AMARYL) 4 MG Tab; Take 1 Tab by mouth 2 times a day for 90 days.  Dispense: 180 Tab; Refill: 0  - lisinopril (PRINIVIL, ZESTRIL) 40 MG tablet; Take 1 Tab by mouth every day.  Dispense: 90 Tab; Refill: 1  - metFORMIN (GLUCOPHAGE) 850 MG Tab; Take 1 Tab by mouth 3 times a day for 90 days.  Dispense: 270 Tab; Refill: 0  - pravastatin (PRAVACHOL) 10 MG Tab; Take 1 Tab by mouth every day for 90 days.  Dispense: 90 Tab; Refill: 0    5. Neuropathy (HCC)  This appears to be due to prior lumbar   - gabapentin (NEURONTIN) 300 MG Cap; Take 1 Cap by mouth every bedtime.  Dispense: 90 Cap; Refill: 1    6. Vitamin D deficiency  We will continue vitamin D supplementation, 2000 units daily should be appropriate indefinitely.    7. Controlled type 2 diabetes mellitus without complication, without long-term current use of insulin (HCC)  - amLODIPine (NORVASC) 10 MG Tab; Take 1 Tab by mouth every day.  Dispense: 90 Tab; Refill: 1  - atenolol (TENORMIN) 50 MG Tab; Take 2 Tabs by mouth every day for 90 days.  Dispense: 180 Tab; Refill: 0  - glimepiride (AMARYL) 4 MG Tab; Take 1 Tab by mouth 2 times a day for 90 days.  Dispense: 180 Tab; Refill: 0  - lisinopril (PRINIVIL, ZESTRIL) 40 MG tablet; Take 1 Tab by mouth every day.  Dispense: 90 Tab; Refill: 1  - metFORMIN (GLUCOPHAGE) 850 MG Tab; Take 1 Tab by mouth 3 times a day for 90 days.  Dispense: 270 Tab; Refill: 0  - pravastatin (PRAVACHOL) 10 MG Tab; Take 1 Tab by mouth every day for 90 days.  Dispense: 90 Tab; Refill: 0

## 2019-01-17 NOTE — ASSESSMENT & PLAN NOTE
She reports she has had hypertension since her 20s.  She has been on medications since and reports all work up has been normal.  She reports her BP today is typical for her and that it is always in the range of 130-140s systolic.  She would like to keep her medications the same today.  She denies side effects.

## 2019-01-17 NOTE — ASSESSMENT & PLAN NOTE
The 10-year ASCVD risk score (Angely CALDERON Jr., et al., 2013) is: 36.2%    Values used to calculate the score:      Age: 73 years      Sex: Female      Is Non- : No      Diabetic: Yes      Tobacco smoker: No      Systolic Blood Pressure: 148 mmHg      Is BP treated: Yes      HDL Cholesterol: 32 mg/dL      Total Cholesterol: 172 mg/dL     She is on pravastatin 10 mg nightly.  She did not tolerate higher intensity treatment due to myalgias.

## 2019-01-17 NOTE — ASSESSMENT & PLAN NOTE
She has right leg neuropathy after lumbar disc disease.  This was treated with surgery and improved but she has residual right leg symptoms.  She describes numbness in her right foot and ankle.  She was previously on gabapentin which helped.

## 2019-01-17 NOTE — ASSESSMENT & PLAN NOTE
She is on maximum dose metformin and glimepiride for diabetes.  Her A1c remains above goal.  She is tolerating her current medications.    Lab Results   Component Value Date/Time    HBA1C 9.0 01/17/2019 08:15 AM    HBA1C 8.7 (H) 07/09/2018 07:10 AM

## 2019-01-29 ENCOUNTER — NON-PROVIDER VISIT (OUTPATIENT)
Dept: MEDICAL GROUP | Facility: PHYSICIAN GROUP | Age: 74
End: 2019-01-29
Payer: MEDICARE

## 2019-01-29 VITALS — BODY MASS INDEX: 31.72 KG/M2 | HEIGHT: 61 IN | WEIGHT: 168 LBS

## 2019-01-29 PROCEDURE — 99211 OFF/OP EST MAY X REQ PHY/QHP: CPT | Performed by: FAMILY MEDICINE

## 2019-01-29 NOTE — PROGRESS NOTES
"RN-CDE Note    Subjective:     Health changes since last visit/interval Hx: None    Medications (including changes made today)  Current Outpatient Prescriptions   Medication Sig Dispense Refill   • Empagliflozin 10 MG Tab Take 10 mg by mouth every day. 30 Tab 0   • vitamin D (CHOLECALCIFEROL) 1000 UNIT Tab Take 1,000 Units by mouth every day.     • gabapentin (NEURONTIN) 300 MG Cap Take 1 Cap by mouth every bedtime. 90 Cap 1   • amLODIPine (NORVASC) 10 MG Tab Take 1 Tab by mouth every day. 90 Tab 1   • atenolol (TENORMIN) 50 MG Tab Take 2 Tabs by mouth every day for 90 days. 180 Tab 0   • glimepiride (AMARYL) 4 MG Tab Take 1 Tab by mouth 2 times a day for 90 days. 180 Tab 0   • lisinopril (PRINIVIL, ZESTRIL) 40 MG tablet Take 1 Tab by mouth every day. 90 Tab 1   • metFORMIN (GLUCOPHAGE) 850 MG Tab Take 1 Tab by mouth 3 times a day for 90 days. 270 Tab 0   • pravastatin (PRAVACHOL) 10 MG Tab Take 1 Tab by mouth every day for 90 days. 90 Tab 0   • aspirin EC (ECOTRIN) 81 MG Tablet Delayed Response Take 81 mg by mouth every day.     • glucose blood strip 1 Each by Other route as needed.     • STOOL SOFTENER PO Take 1 Tab by mouth every day.       No current facility-administered medications for this visit.        Taking daily ASA: Yes  Taking above medications as prescribed: yes  SIDE EFFECTS: Patient denies side effects to medications    Exercise: moderate regular exercise, aerobic < 3 days a week  Diet: \"unhealthy\" diet in general  Patient's body mass index is 31.74 kg/m². Exercise and nutrition counseling were performed at this visit.      Health Maintenance:   Health Maintenance Due   Topic Date Due   • Annual Wellness Visit  1945   • BONE DENSITY  11/12/2011       Immunizations:   PPSV23: Up-to-date  [id number]: Up-to-date  Tdap: Up-to-date  Flu: Up-to-date  Hep B: Up-to-date    DM:   Last A1c:   Lab Results   Component Value Date/Time    HBA1C 9.0 01/17/2019 08:15 AM      A1C GOAL: < 7    Glucose monitoring " frequency: sometimes  138 this am  Hypoglycemic episodes: no    Last Retinal Exam: on file and up-to-date  Daily Foot Exam: Yes   Routine Dental Exams: Yes    No results found for: MICROALBCALC, MALBCRT, MALBEXCR, [id number], MICROALBUR, MICRALB, UMICROALBUM, MICROALBTIM     ACR Albumin/Creatinine Ratio goal <30     HTN:   Blood pressure goal <140/<80 .   Currently Rx ACE/ARB: Yes    Dyslipidemia:    Lab Results   Component Value Date/Time    CHOLSTRLTOT 172 07/09/2018 07:10 AM    LDL 88 07/09/2018 07:10 AM    HDL 32 (A) 07/09/2018 07:10 AM    TRIGLYCERIDE 262 (H) 07/09/2018 07:10 AM       Lab Results   Component Value Date/Time    SODIUM 136 07/09/2018 07:10 AM    POTASSIUM 4.1 07/09/2018 07:10 AM    CHLORIDE 104 07/09/2018 07:10 AM    CO2 20 07/09/2018 07:10 AM    GLUCOSE 123 (H) 07/09/2018 07:10 AM    BUN 21 07/09/2018 07:10 AM    CREATININE 0.83 07/09/2018 07:10 AM     Lab Results   Component Value Date/Time    ALKPHOSPHAT 40 07/09/2018 07:10 AM    ASTSGOT 24 07/09/2018 07:10 AM    ALTSGPT 28 07/09/2018 07:10 AM    TBILIRUBIN 0.6 07/09/2018 07:10 AM        Currently Rx Statin: Yes    She  reports that she has never smoked. She has never used smokeless tobacco.    Objective:     Exam:  Monofilament: DONE    Plan:     Discussed and educated on:   - All medications, side effects and compliance (discussed carefully)  - Annual eye examinations at Ophthalmology  - Diabetic Meal Plan: foods that contain carbs  - Home glucose monitoring emphasized  - Weight control and daily exercise    Recommended medication changes: suggested actos but patient is not ready to try it at this time. Also discussed a long acting insulin (8-10 units at HS) will reconsider jardiance at next appt. Medications are cost prohibitive. Will contact insurance to discuss options

## 2019-02-20 ENCOUNTER — NON-PROVIDER VISIT (OUTPATIENT)
Dept: MEDICAL GROUP | Facility: PHYSICIAN GROUP | Age: 74
End: 2019-02-20
Payer: MEDICARE

## 2019-02-20 ENCOUNTER — ANTICOAGULATION MONITORING (OUTPATIENT)
Dept: MEDICAL GROUP | Facility: PHYSICIAN GROUP | Age: 74
End: 2019-02-20

## 2019-02-20 PROCEDURE — 82962 GLUCOSE BLOOD TEST: CPT | Performed by: FAMILY MEDICINE

## 2019-02-20 PROCEDURE — 99403 PREV MED CNSL INDIV APPRX 45: CPT | Performed by: FAMILY MEDICINE

## 2019-02-20 NOTE — PROGRESS NOTES
New Patient Consult Note  Primary care physician: Carolin Baltazar M.D.  Start time:08:15  End time: 9:07    Reason for consult: Management of Uncontrolled Type 2 Diabetes    HPI:  Zenaida Rios is a 73 y.o. old patient who comes in today for evaluation of above stated problem.    Most Recent HbA1c:   Lab Results   Component Value Date/Time    HBA1C 9.0 01/17/2019 08:15 AM      FSBG 193 in office    Current Diabetes Regimen:    Metformin 850 mg tid     Other: glimipiride 4mg po bid    Before Breakfast:117-160 am fasting  Before Lunch:  Before Dinner:  Before Bedtime:  Other times:  Hypoglycemia:  None      ROS:  Constitutional: No weight loss pt refuses to be weighed, discussed that we will need to monitor her for weight loss with the proposed new medications.  Cardiac: No palpitations or racing heart  Resp: No shortness of breath  Neuro: No numbness or tinging in feet  Endo: No heat or cold intolerance, no polyuria or polydipsia  All other systems were reviewed and were negative.    Past Medical History:  Patient Active Problem List    Diagnosis Date Noted   • Neuropathy (HCC) 01/17/2019   • Vitamin D deficiency 01/17/2019   • Obesity (BMI 30-39.9) 07/17/2018   • Mixed hyperlipidemia 05/21/2018   • Benign essential HTN 05/21/2018   • Uncontrolled type 2 diabetes mellitus with hyperglycemia (HCC) 05/21/2018       Past Surgical History:  Past Surgical History:   Procedure Laterality Date   • LUMBAR FUSION POSTERIOR  1/11/2010    Performed by LYNNETTE SMILEY at SURGERY [facility]   • LAMINOTOMY  1/11/2010    Performed by LYNNETTE SMILEY at SURGERY [facility]   • LUMBAR LAMINECTOMY DISKECTOMY  8/31/2009   • OTHER ORTHOPEDIC SURGERY  1989    tibia has a tanna knee to ankle, fibula is resected   • PRIMARY C SECTION  1988   • OTHER  1975    left lumpectomt in breast - benign   • OTHER         Allergies:  Percocet [oxycodone-acetaminophen]; Demerol; and Statins [hmg-coa-r inhibitors]    Social  History:  Social History     Social History   • Marital status:      Spouse name: N/A   • Number of children: N/A   • Years of education: N/A     Occupational History   • Not on file.     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        Family History:  Family History   Problem Relation Age of Onset   • Adopted: Yes       Medications:    Current Outpatient Prescriptions:   •  Empagliflozin 10 MG Tab, Take 10 mg by mouth every day., Disp: 30 Tab, Rfl: 0  •  vitamin D (CHOLECALCIFEROL) 1000 UNIT Tab, Take 1,000 Units by mouth every day., Disp: , Rfl:   •  gabapentin (NEURONTIN) 300 MG Cap, Take 1 Cap by mouth every bedtime., Disp: 90 Cap, Rfl: 1  •  amLODIPine (NORVASC) 10 MG Tab, Take 1 Tab by mouth every day., Disp: 90 Tab, Rfl: 1  •  atenolol (TENORMIN) 50 MG Tab, Take 2 Tabs by mouth every day for 90 days., Disp: 180 Tab, Rfl: 0  •  glimepiride (AMARYL) 4 MG Tab, Take 1 Tab by mouth 2 times a day for 90 days., Disp: 180 Tab, Rfl: 0  •  lisinopril (PRINIVIL, ZESTRIL) 40 MG tablet, Take 1 Tab by mouth every day., Disp: 90 Tab, Rfl: 1  •  metFORMIN (GLUCOPHAGE) 850 MG Tab, Take 1 Tab by mouth 3 times a day for 90 days., Disp: 270 Tab, Rfl: 0  •  pravastatin (PRAVACHOL) 10 MG Tab, Take 1 Tab by mouth every day for 90 days., Disp: 90 Tab, Rfl: 0  •  aspirin EC (ECOTRIN) 81 MG Tablet Delayed Response, Take 81 mg by mouth every day., Disp: , Rfl:   •  glucose blood strip, 1 Each by Other route as needed., Disp: , Rfl:   •  STOOL SOFTENER PO, Take 1 Tab by mouth every day., Disp: , Rfl:     Labs: Reviewed    Physical Examination:  Vital signs: There were no vitals taken for this visit. There is no height or weight on file to calculate BMI.  General: No  apparent distress, cooperative  Eyes: No scleral icterus or discharge  ENMT: Normal on external inspection of nose, lips, normal thyroid exam  Neck: No abnormal masses on inspection  Resp: Normal effort, clear to auscultation bilaterally   CVS: Regular rate and rhythm, S1 S2 normal, no murmur   Extremities: No edema  Abdomen: abdominal obesity present  Neuro: Alert and oriented  Skin: No rash  Psych: Normal mood and affect, intact memory and able to make informed decisions    Assessment and Plan:  Pt presents for DM mangement.  Pt is a retired RN.  Pt reports that Jardiance would be $1500 out of pocket, and has not yet started it yet    Will begin with Jardiance 10mg, will optimize to 25mg po daily  Pt is hesitant to try new medications, however we will revisit starting a GLP-1 at next visit too.  Pt is currently optimized on Metformin 850mg po tid, will likely switch to 1000mg po bid, once her current supply is used.  Will continue glimiperide for now.  Pt reports that she is familiar with a diabetic diet, however does not currently follow one, as evidenced b y her breakfast of a cinnamon roll and orange.  Will revisit diet at next visit.    Pt is not very active.  Discussed exercise goal of 30 minutes 5x week.  I will revisit this at her next visit as well.        Return in about 2 weeks (around 3/6/2019).    Thank you for allowing me to participate in the care of this patient.    Madeleine Mi  02/20/19    CC:   Carolin Baltazar M.D.    This note was created using voice recognition software (Dragon). The accuracy of the dictation is limited by the abilities of the software. I have reviewed the note prior to signing, however some errors in grammar and context are still possible. If you have any questions related to this note please do not hesitate to contact our office.

## 2019-03-06 ENCOUNTER — NON-PROVIDER VISIT (OUTPATIENT)
Dept: MEDICAL GROUP | Facility: PHYSICIAN GROUP | Age: 74
End: 2019-03-06
Payer: MEDICARE

## 2019-03-06 VITALS — DIASTOLIC BLOOD PRESSURE: 80 MMHG | HEART RATE: 64 BPM | SYSTOLIC BLOOD PRESSURE: 140 MMHG | OXYGEN SATURATION: 98 %

## 2019-03-06 DIAGNOSIS — E11.8 TYPE 2 DIABETES MELLITUS WITH COMPLICATION, WITHOUT LONG-TERM CURRENT USE OF INSULIN (HCC): ICD-10-CM

## 2019-03-06 LAB — GLUCOSE BLD-MCNC: 198 MG/DL (ref 70–100)

## 2019-03-06 PROCEDURE — 82962 GLUCOSE BLOOD TEST: CPT | Performed by: FAMILY MEDICINE

## 2019-03-06 PROCEDURE — 99402 PREV MED CNSL INDIV APPRX 30: CPT | Performed by: FAMILY MEDICINE

## 2019-03-06 NOTE — NON-PROVIDER
New Patient Consult Note  Primary care physician: Carolin Baltazar M.D.    Reason for consult: Management of Uncontrolled Type 2 Diabetes   Start time:0837  End time: 0915    HPI:  Zenaida Rios is a 73 y.o. old patient who comes in today for evaluation of above stated problem.    Most Recent HbA1c:   Lab Results   Component Value Date/Time    HBA1C 9.0 01/17/2019 08:15 AM        Current Diabetes Regimen:    SGLT-2 Inhibitor:  Empagliflozin 10 mg once daily pt took three doses, and refuses to continue.    Metformin 850mg po tid    Other: glimipiride 4mg bid    Before Breakfast: 124-198  Before Lunch:  Before Dinner:  Before Bedtime:  Other times:  Hypoglycemia:  None      ROS:  Constitutional: No weight loss  Cardiac: No palpitations or racing heart  Resp: No shortness of breath  Neuro: No numbness or tinging in feet  Endo: No heat or cold intolerance, no polyuria or polydipsia  All other systems were reviewed and were negative.    Past Medical History:  Patient Active Problem List    Diagnosis Date Noted   • Neuropathy (HCC) 01/17/2019   • Vitamin D deficiency 01/17/2019   • Obesity (BMI 30-39.9) 07/17/2018   • Mixed hyperlipidemia 05/21/2018   • Benign essential HTN 05/21/2018   • Uncontrolled type 2 diabetes mellitus with hyperglycemia (HCC) 05/21/2018       Past Surgical History:  Past Surgical History:   Procedure Laterality Date   • LUMBAR FUSION POSTERIOR  1/11/2010    Performed by LYNNETTE SMILEY at SURGERY [facility]   • LAMINOTOMY  1/11/2010    Performed by LYNNETTE SMILEY at SURGERY [facility]   • LUMBAR LAMINECTOMY DISKECTOMY  8/31/2009   • OTHER ORTHOPEDIC SURGERY  1989    tibia has a tanna knee to ankle, fibula is resected   • PRIMARY C SECTION  1988   • OTHER  1975    left lumpectomt in breast - benign   • OTHER         Allergies:  Percocet [oxycodone-acetaminophen]; Demerol; and Statins [hmg-coa-r inhibitors]    Social History:  Social History     Social History   • Marital status:       Spouse name: N/A   • Number of children: N/A   • Years of education: N/A     Occupational History   • Not on file.     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        Family History:  Family History   Problem Relation Age of Onset   • Adopted: Yes       Medications:    Current Outpatient Prescriptions:   •  Empagliflozin 10 MG Tab, Take 10 mg by mouth every day., Disp: 30 Tab, Rfl: 0  •  vitamin D (CHOLECALCIFEROL) 1000 UNIT Tab, Take 1,000 Units by mouth every day., Disp: , Rfl:   •  gabapentin (NEURONTIN) 300 MG Cap, Take 1 Cap by mouth every bedtime., Disp: 90 Cap, Rfl: 1  •  amLODIPine (NORVASC) 10 MG Tab, Take 1 Tab by mouth every day., Disp: 90 Tab, Rfl: 1  •  atenolol (TENORMIN) 50 MG Tab, Take 2 Tabs by mouth every day for 90 days., Disp: 180 Tab, Rfl: 0  •  glimepiride (AMARYL) 4 MG Tab, Take 1 Tab by mouth 2 times a day for 90 days., Disp: 180 Tab, Rfl: 0  •  lisinopril (PRINIVIL, ZESTRIL) 40 MG tablet, Take 1 Tab by mouth every day., Disp: 90 Tab, Rfl: 1  •  metFORMIN (GLUCOPHAGE) 850 MG Tab, Take 1 Tab by mouth 3 times a day for 90 days., Disp: 270 Tab, Rfl: 0  •  pravastatin (PRAVACHOL) 10 MG Tab, Take 1 Tab by mouth every day for 90 days., Disp: 90 Tab, Rfl: 0  •  aspirin EC (ECOTRIN) 81 MG Tablet Delayed Response, Take 81 mg by mouth every day., Disp: , Rfl:   •  glucose blood strip, 1 Each by Other route as needed., Disp: , Rfl:   •  STOOL SOFTENER PO, Take 1 Tab by mouth every day., Disp: , Rfl:     Labs: Reviewed    Physical Examination:  Vital signs: There were no vitals taken for this visit. There is no height or weight on file to calculate BMI.  General: No apparent distress, cooperative  Eyes: No scleral icterus or  discharge  ENMT: Normal on external inspection of nose, lips, normal thyroid exam  Neck: No abnormal masses on inspection  Resp: Normal effort, clear to auscultation bilaterally   CVS: Regular rate and rhythm, S1 S2 normal, no murmur   Extremities: No edema  Abdomen: abdominal obesity present  Neuro: Alert and oriented  Skin: No rash  Psych: Normal mood and affect, intact memory and able to make informed decisions    Assessment and Plan:    Pt did not tolerate Jardiance, she did NOT CALL me either, Pt only took three doses, and described a vaginal yeast infection.  Pt does not wish to try another SGLT-2  Pt is not interested in trying a GLP-1 even samples, as she is unwilling to pay her coinsurance for this.  During our visit we did begin the paperwork for [facility] Medical patient assistance.  She is willing to try bydureon if she can get it for free.    We discuss lifestyle changes that she could make for free, for example increase exercise as tolerated.  Pt is not interested in dietary changes.  She usually chooses convience over low carbohydrate, low sodium.  Pt is willing, however, to spend upwards of thousands of dollars on vet bills, however will devote her resources to self care.    No Follow-up on file.    Thank you for allowing me to participate in the care of this patient.    Madeleine Mi  03/06/19    CC:   Carolin Baltazar M.D.    This note was created using voice recognition software (Dragon). The accuracy of the dictation is limited by the abilities of the software. I have reviewed the note prior to signing, however some errors in grammar and context are still possible. If you have any questions related to this note please do not hesitate to contact our office.

## 2019-04-15 ENCOUNTER — HOSPITAL ENCOUNTER (OUTPATIENT)
Dept: LAB | Facility: MEDICAL CENTER | Age: 74
End: 2019-04-15
Attending: FAMILY MEDICINE
Payer: MEDICARE

## 2019-04-15 DIAGNOSIS — E11.65 UNCONTROLLED TYPE 2 DIABETES MELLITUS WITH HYPERGLYCEMIA (HCC): ICD-10-CM

## 2019-04-15 PROCEDURE — 83036 HEMOGLOBIN GLYCOSYLATED A1C: CPT

## 2019-04-15 PROCEDURE — 36415 COLL VENOUS BLD VENIPUNCTURE: CPT

## 2019-04-16 LAB
EST. AVERAGE GLUCOSE BLD GHB EST-MCNC: 217 MG/DL
HBA1C MFR BLD: 9.2 % (ref 0–5.6)

## 2019-04-18 ENCOUNTER — OFFICE VISIT (OUTPATIENT)
Dept: MEDICAL GROUP | Facility: PHYSICIAN GROUP | Age: 74
End: 2019-04-18
Payer: MEDICARE

## 2019-04-18 VITALS
DIASTOLIC BLOOD PRESSURE: 70 MMHG | TEMPERATURE: 97.3 F | RESPIRATION RATE: 19 BRPM | SYSTOLIC BLOOD PRESSURE: 142 MMHG | OXYGEN SATURATION: 97 % | BODY MASS INDEX: 31.74 KG/M2 | HEIGHT: 61 IN | HEART RATE: 63 BPM

## 2019-04-18 DIAGNOSIS — E78.2 MIXED HYPERLIPIDEMIA: ICD-10-CM

## 2019-04-18 DIAGNOSIS — I10 BENIGN ESSENTIAL HTN: ICD-10-CM

## 2019-04-18 DIAGNOSIS — E11.65 UNCONTROLLED TYPE 2 DIABETES MELLITUS WITH HYPERGLYCEMIA (HCC): ICD-10-CM

## 2019-04-18 DIAGNOSIS — E55.9 VITAMIN D DEFICIENCY: ICD-10-CM

## 2019-04-18 PROCEDURE — 99214 OFFICE O/P EST MOD 30 MIN: CPT | Performed by: FAMILY MEDICINE

## 2019-04-18 RX ORDER — GLIMEPIRIDE 4 MG/1
4 TABLET ORAL EVERY MORNING
COMMUNITY
End: 2019-04-18

## 2019-04-18 RX ORDER — GLIMEPIRIDE 4 MG/1
4 TABLET ORAL 2 TIMES DAILY
COMMUNITY
End: 2019-09-30 | Stop reason: SDUPTHER

## 2019-04-18 NOTE — ASSESSMENT & PLAN NOTE
Her BP is above her goal today of 130/90 or less.  She states this is good for her and she is happy with it.  She is on atenolol 50 mg bid, amlodipine 10 mg and lisinopril 40 mg.

## 2019-04-18 NOTE — ASSESSMENT & PLAN NOTE
Her blood sugars have been sporadic.  Her fasting glucose is  in the morning.  She has met with the pharmacy service and diabetic education and tried jardiance but did not tolerate it and had a yeast infection and felt dry on it.  She also states it was too expensive.  She is on metformin 850 mg three times a day and glimepiride 4 mg bid, both max doses.    Lab Results   Component Value Date/Time    HBA1C 9.2 (H) 04/15/2019 07:34 AM    HBA1C 9.0 01/17/2019 08:15 AM

## 2019-04-18 NOTE — ASSESSMENT & PLAN NOTE
She is on pravastatin and is tolerating it well.    Lab Results   Component Value Date/Time    CHOLSTRLTOT 172 07/09/2018 07:10 AM    LDL 88 07/09/2018 07:10 AM    HDL 32 (A) 07/09/2018 07:10 AM    TRIGLYCERIDE 262 (H) 07/09/2018 07:10 AM       LDL is near goal as of last year.

## 2019-04-18 NOTE — LETTER
[facility]  Carolin Baltazar M.D.  [address]  [facility] [address]  Fax: [phone]   Authorization for Release/Disclosure of   Protected Health Information   Name: ZENAIDA IBRAHIM : 1945 SSN: [id number]   Address: [address] [place] [address] [address]. [address] [facility] [address] Phone:    [phone] (home)    I authorize the entity listed below to release/disclose the PHI below to:   [facility]/Carolin Baltazar M.D. and Carolin Baltazar M.D.   Provider or Entity Name:  [facility], DR. PACHECO   Address   City, State, [facility]   Phone:      Fax:  [phone]   Reason for request: continuity of care   Information to be released:    [  ] LAST COLONOSCOPY,  including any PATH REPORT and follow-up  [  ] LAST FIT/COLOGUARD RESULT [  ] LAST DEXA  [  ] LAST MAMMOGRAM  [  ] LAST PAP  [  ] LAST LABS [  ] RETINA EXAM REPORT  [  ] IMMUNIZATION RECORDS  [X  ] Release all info      [  ] Check here and initial the line next to each item to release ALL health information INCLUDING  _____ Care and treatment for drug and / or alcohol abuse  _____ HIV testing, infection status, or AIDS  _____ Genetic Testing    DATES OF SERVICE OR TIME PERIOD TO BE DISCLOSED: _____________  I understand and acknowledge that:  * This Authorization may be revoked at any time by you in writing, except if your health information has already been used or disclosed.  * Your health information that will be used or disclosed as a result of you signing this authorization could be re-disclosed by the recipient. If this occurs, your re-disclosed health information may no longer be protected by State or Federal laws.  * You may refuse to sign this Authorization. Your refusal will not affect your ability to obtain treatment.  * This Authorization becomes effective upon signing and will  on (date) __________.      If no date is indicated, this Authorization will  one (1) year from the signature date.    Name: Zenaida SCOTT  Blanca    Signature:   Date:     4/18/2019       PLEASE FAX REQUESTED RECORDS BACK TO: [phone]

## 2019-04-18 NOTE — PROGRESS NOTES
CC: diabetes, jardiance is too expensive    HISTORY OF PRESENT ILLNESS: Patient is a 73 y.o. female established patient who presents today to discuss the following chronic problems    Health Maintenance: Completed, she declines colonoscopy, she has had a bone density with her prior PCP Dr. Woods, records requested today.    Uncontrolled type 2 diabetes mellitus with hyperglycemia (HCC)  Her blood sugars have been sporadic.  Her fasting glucose is  in the morning.  She has met with the pharmacy service and diabetic education and tried jardiance but did not tolerate it and had a yeast infection and felt dry on it.  She also states it was too expensive.  She is on metformin 850 mg three times a day and glimepiride 4 mg bid, both max doses.    Lab Results   Component Value Date/Time    HBA1C 9.2 (H) 04/15/2019 07:34 AM    HBA1C 9.0 01/17/2019 08:15 AM       Mixed hyperlipidemia  She is on pravastatin and is tolerating it well.    Lab Results   Component Value Date/Time    CHOLSTRLTOT 172 07/09/2018 07:10 AM    LDL 88 07/09/2018 07:10 AM    HDL 32 (A) 07/09/2018 07:10 AM    TRIGLYCERIDE 262 (H) 07/09/2018 07:10 AM       LDL is near goal as of last year.    Benign essential HTN  Her BP is above her goal today of 130/90 or less.  She states this is good for her and she is happy with it.  She is on atenolol 50 mg bid, amlodipine 10 mg and lisinopril 40 mg.     Vitamin D deficiency  She is taking 1000 units of vitamin D daily.  Her last vitamin D level was low at 15.      Patient Active Problem List    Diagnosis Date Noted   • Neuropathy (HCC) 01/17/2019   • Vitamin D deficiency 01/17/2019   • Obesity (BMI 30-39.9) 07/17/2018   • Mixed hyperlipidemia 05/21/2018   • Benign essential HTN 05/21/2018   • Uncontrolled type 2 diabetes mellitus with hyperglycemia (HCC) 05/21/2018      Allergies:Percocet [oxycodone-acetaminophen]; Demerol; and Statins [hmg-coa-r inhibitors]    Current Outpatient Prescriptions   Medication  "Sig Dispense Refill   • glimepiride (AMARYL) 4 MG Tab Take 4 mg by mouth 2 Times a Day.     • atenolol (TENORMIN) 50 MG Tab TAKE 2 TABLETS EVERY  Tab 0   • pravastatin (PRAVACHOL) 10 MG Tab TAKE 1 TABLET EVERY DAY 90 Tab 0   • metFORMIN (GLUCOPHAGE) 850 MG Tab TAKE 1 TABLET THREE TIMES DAILY 270 Tab 0   • vitamin D (CHOLECALCIFEROL) 1000 UNIT Tab Take 1,000 Units by mouth every day.     • gabapentin (NEURONTIN) 300 MG Cap Take 1 Cap by mouth every bedtime. 90 Cap 1   • amLODIPine (NORVASC) 10 MG Tab Take 1 Tab by mouth every day. 90 Tab 1   • lisinopril (PRINIVIL, ZESTRIL) 40 MG tablet Take 1 Tab by mouth every day. 90 Tab 1   • aspirin EC (ECOTRIN) 81 MG Tablet Delayed Response Take 81 mg by mouth every day.     • glucose blood strip 1 Each by Other route as needed.     • STOOL SOFTENER PO Take 1 Tab by mouth every day.       No current facility-administered medications for this visit.        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     - Respiratory: Negative for cough, sputum production, chest congestion, dyspnea, wheezing, and crackles.      - Cardiovascular: Negative for chest pain, palpitations, orthopnea, and bilateral lower extremity edema.     - Gastrointestinal: Negative for heartburn, nausea, vomiting, abdominal pain, hematochezia, melena, diarrhea, constipation, and greasy/foul-smelling stools.        Exam:    /70   Pulse 63   Temp 36.3 °C (97.3 °F) (Temporal)   Resp 19   Ht 1.549 m (5' 1\")   SpO2 97%  Body mass index is 31.74 kg/m².    General:  Well nourished, well developed female in NAD  Head is grossly " normal.  Neck: Supple without JVD or bruit. Thyroid is not enlarged.  Pulmonary: Clear to ausculation and percussion.  Normal effort. No rales, ronchi, or wheezing.  Cardiovascular: Regular rate and rhythm without murmur. Carotid and radial pulses are intact and equal bilaterally.  Extremities: no clubbing, cyanosis, or edema.      Assessment/Plan:  1. Benign essential HTN  I reviewed with her goal BP of < 130/90 for kidney protection. She does not want to increase her medications today.    2. Uncontrolled type 2 diabetes mellitus with hyperglycemia (HCC)  I reviewed potential medications with her, either GLP-1 agonist, jardiance or DPP-4 medications or long acting insulin.  She is concerned about cost with all of them.  She is not working on lifestyle now and I also offered a trial of more intense lifestyle intervention prior to starting a medication.  She agrees to this, but  - MICROALBUMIN CREAT RATIO URINE; Future  - HEMOGLOBIN A1C; Future  - Comp Metabolic Panel; Future    3. Mixed hyperlipidemia  This is at goal, continue statin for now and repeat lipids.  - Lipid Profile; Future    4. Vitamin D deficiency  Continue vitamin D, it was low last year so we will repeat levels.  - VITAMIN D,25 HYDROXY; Future

## 2019-07-15 ENCOUNTER — HOSPITAL ENCOUNTER (OUTPATIENT)
Dept: LAB | Facility: MEDICAL CENTER | Age: 74
End: 2019-07-15
Attending: FAMILY MEDICINE
Payer: MEDICARE

## 2019-07-15 DIAGNOSIS — E55.9 VITAMIN D DEFICIENCY: ICD-10-CM

## 2019-07-15 DIAGNOSIS — E78.2 MIXED HYPERLIPIDEMIA: ICD-10-CM

## 2019-07-15 DIAGNOSIS — E11.65 UNCONTROLLED TYPE 2 DIABETES MELLITUS WITH HYPERGLYCEMIA (HCC): ICD-10-CM

## 2019-07-15 LAB
25(OH)D3 SERPL-MCNC: 40 NG/ML (ref 30–100)
ALBUMIN SERPL BCP-MCNC: 4.2 G/DL (ref 3.2–4.9)
ALBUMIN/GLOB SERPL: 1.5 G/DL
ALP SERPL-CCNC: 51 U/L (ref 30–99)
ALT SERPL-CCNC: 26 U/L (ref 2–50)
ANION GAP SERPL CALC-SCNC: 9 MMOL/L (ref 0–11.9)
AST SERPL-CCNC: 22 U/L (ref 12–45)
BILIRUB SERPL-MCNC: 0.5 MG/DL (ref 0.1–1.5)
BUN SERPL-MCNC: 24 MG/DL (ref 8–22)
CALCIUM SERPL-MCNC: 9.1 MG/DL (ref 8.5–10.5)
CHLORIDE SERPL-SCNC: 106 MMOL/L (ref 96–112)
CHOLEST SERPL-MCNC: 189 MG/DL (ref 100–199)
CO2 SERPL-SCNC: 22 MMOL/L (ref 20–33)
CREAT SERPL-MCNC: 1 MG/DL (ref 0.5–1.4)
CREAT UR-MCNC: 66.8 MG/DL
FASTING STATUS PATIENT QL REPORTED: NORMAL
GLOBULIN SER CALC-MCNC: 2.8 G/DL (ref 1.9–3.5)
GLUCOSE SERPL-MCNC: 127 MG/DL (ref 65–99)
HDLC SERPL-MCNC: 38 MG/DL
LDLC SERPL CALC-MCNC: 96 MG/DL
MICROALBUMIN UR-MCNC: 4 MG/DL
MICROALBUMIN/CREAT UR: 60 MG/G (ref 0–30)
POTASSIUM SERPL-SCNC: 4.4 MMOL/L (ref 3.6–5.5)
PROT SERPL-MCNC: 7 G/DL (ref 6–8.2)
SODIUM SERPL-SCNC: 137 MMOL/L (ref 135–145)
TRIGL SERPL-MCNC: 276 MG/DL (ref 0–149)

## 2019-07-15 PROCEDURE — 82306 VITAMIN D 25 HYDROXY: CPT

## 2019-07-15 PROCEDURE — 82570 ASSAY OF URINE CREATININE: CPT

## 2019-07-15 PROCEDURE — 36415 COLL VENOUS BLD VENIPUNCTURE: CPT

## 2019-07-15 PROCEDURE — 82043 UR ALBUMIN QUANTITATIVE: CPT

## 2019-07-15 PROCEDURE — 80053 COMPREHEN METABOLIC PANEL: CPT

## 2019-07-15 PROCEDURE — 83036 HEMOGLOBIN GLYCOSYLATED A1C: CPT | Mod: GA

## 2019-07-15 PROCEDURE — 80061 LIPID PANEL: CPT

## 2019-07-16 LAB
EST. AVERAGE GLUCOSE BLD GHB EST-MCNC: 177 MG/DL
HBA1C MFR BLD: 7.8 % (ref 0–5.6)

## 2019-07-18 ENCOUNTER — OFFICE VISIT (OUTPATIENT)
Dept: MEDICAL GROUP | Facility: PHYSICIAN GROUP | Age: 74
End: 2019-07-18
Payer: MEDICARE

## 2019-07-18 VITALS
BODY MASS INDEX: 32.27 KG/M2 | HEART RATE: 73 BPM | DIASTOLIC BLOOD PRESSURE: 70 MMHG | TEMPERATURE: 97.6 F | SYSTOLIC BLOOD PRESSURE: 120 MMHG | OXYGEN SATURATION: 98 % | HEIGHT: 61 IN | RESPIRATION RATE: 14 BRPM

## 2019-07-18 DIAGNOSIS — M79.10 MYALGIA: ICD-10-CM

## 2019-07-18 DIAGNOSIS — R94.4 DECREASED GFR: ICD-10-CM

## 2019-07-18 DIAGNOSIS — E11.65 UNCONTROLLED TYPE 2 DIABETES MELLITUS WITH HYPERGLYCEMIA (HCC): ICD-10-CM

## 2019-07-18 DIAGNOSIS — E78.2 MIXED HYPERLIPIDEMIA: ICD-10-CM

## 2019-07-18 DIAGNOSIS — I10 BENIGN ESSENTIAL HTN: ICD-10-CM

## 2019-07-18 PROCEDURE — 99214 OFFICE O/P EST MOD 30 MIN: CPT | Performed by: FAMILY MEDICINE

## 2019-07-18 NOTE — ASSESSMENT & PLAN NOTE
Lab Results   Component Value Date/Time    CHOLSTRLTOT 189 07/15/2019 07:41 AM    CHOLSTRLTOT 172 07/09/2018 07:10 AM    LDL 96 07/15/2019 07:41 AM    LDL 88 07/09/2018 07:10 AM    HDL 38 (A) 07/15/2019 07:41 AM    HDL 32 (A) 07/09/2018 07:10 AM    TRIGLYCERIDE 276 (H) 07/15/2019 07:41 AM    TRIGLYCERIDE 262 (H) 07/09/2018 07:10 AM       She is appropriately on a statin but not high dose due to intolerance.

## 2019-07-18 NOTE — ASSESSMENT & PLAN NOTE
Lab Results   Component Value Date/Time    HBA1C 7.8 (H) 07/15/2019 07:41 AM    HBA1C 9.2 (H) 04/15/2019 07:34 AM     Her numbers have been fluctuation.  Fasting blood sugars between , she is not aware of an average.  Her sleeping hours are erratic due to work.    She is due for a retinal screening and will return.

## 2019-07-18 NOTE — PATIENT INSTRUCTIONS
GLP-1 agonist - what is the preferred agent.      Coenzyme Q10 100 mg twice daily can help with muscle aches

## 2019-07-18 NOTE — ASSESSMENT & PLAN NOTE
She has had several weeks of myalgias.  She describes muscle aches in her bilateral legs and low back that are worse after activity or working all day.  She is able to tolerate them.  She is on pravastatin 10 mg, she did not tolerate higher doses.

## 2019-07-18 NOTE — PROGRESS NOTES
CC: diabetes, myalgias    HISTORY OF PRESENT ILLNESS: Patient is a 73 y.o. female established patient who presents today for routine follow up    Health Maintenance: reviewed, bone density declined    Uncontrolled type 2 diabetes mellitus with hyperglycemia (HCC)  Lab Results   Component Value Date/Time    HBA1C 7.8 (H) 07/15/2019 07:41 AM    HBA1C 9.2 (H) 04/15/2019 07:34 AM     Her numbers have been fluctuation.  Fasting blood sugars between , she is not aware of an average.  Her sleeping hours are erratic due to work.    She is due for a retinal screening and will return.        Myalgia  She has had several weeks of myalgias.  She describes muscle aches in her bilateral legs and low back that are worse after activity or working all day.  She is able to tolerate them.  She is on pravastatin 10 mg, she did not tolerate higher doses.    Mixed hyperlipidemia  Lab Results   Component Value Date/Time    CHOLSTRLTOT 189 07/15/2019 07:41 AM    CHOLSTRLTOT 172 07/09/2018 07:10 AM    LDL 96 07/15/2019 07:41 AM    LDL 88 07/09/2018 07:10 AM    HDL 38 (A) 07/15/2019 07:41 AM    HDL 32 (A) 07/09/2018 07:10 AM    TRIGLYCERIDE 276 (H) 07/15/2019 07:41 AM    TRIGLYCERIDE 262 (H) 07/09/2018 07:10 AM       She is appropriately on a statin but not high dose due to intolerance.      Benign essential HTN  Her BP is controlled today on amlodipine, lisinopril, and atenolol.    Decreased GFR  Recent labs show a first time decrease in GFR at 54.  Microalbumin creatinine ratio is elevated but there is no proteinuria.  She denies symptoms of kidney stones or retention.  She is on lisinopril and her BP is controlled.  She has not been dehydrated.      Patient Active Problem List    Diagnosis Date Noted   • Decreased GFR 07/18/2019   • Myalgia 07/18/2019   • Neuropathy (HCC) 01/17/2019   • Vitamin D deficiency 01/17/2019   • Obesity (BMI 30-39.9) 07/17/2018   • Mixed hyperlipidemia 05/21/2018   • Benign essential HTN 05/21/2018   •  Uncontrolled type 2 diabetes mellitus with hyperglycemia ([facility]) 05/21/2018      Allergies:Percocet [oxycodone-acetaminophen]; Demerol; and Statins [hmg-coa-r inhibitors]    Current Outpatient Prescriptions   Medication Sig Dispense Refill   • gabapentin (NEURONTIN) 300 MG Cap TAKE 1 CAPSULE AT BEDTIME 90 Cap 1   • amLODIPine (NORVASC) 10 MG Tab TAKE 1 TABLET EVERY DAY 90 Tab 1   • lisinopril (PRINIVIL, ZESTRIL) 40 MG tablet TAKE 1 TABLET EVERY DAY 90 Tab 1   • pravastatin (PRAVACHOL) 10 MG Tab TAKE 1 TABLET EVERY DAY 90 Tab 0   • metFORMIN (GLUCOPHAGE) 850 MG Tab TAKE 1 TABLET THREE TIMES DAILY 270 Tab 0   • atenolol (TENORMIN) 50 MG Tab TAKE 2 TABLETS EVERY  Tab 0   • glimepiride (AMARYL) 4 MG Tab Take 4 mg by mouth 2 Times a Day.     • vitamin D (CHOLECALCIFEROL) 1000 UNIT Tab Take 1,000 Units by mouth every day.     • aspirin EC (ECOTRIN) 81 MG Tablet Delayed Response Take 81 mg by mouth every day.     • glucose blood strip 1 Each by Other route as needed.     • STOOL SOFTENER PO Take 1 Tab by mouth every day.       No current facility-administered medications for this visit.        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     - Respiratory: Negative for cough, sputum production, chest congestion, dyspnea, wheezing, and crackles.      - Cardiovascular: Negative for chest pain, palpitations, orthopnea, and bilateral lower extremity edema.     - Genitourinary: Negative for dysuria, polyuria, hematuria, pyuria, urinary urgency, and urinary incontinence.    - Musculoskeletal: positive for myalgias, negative for centra back pain    - Skin:  "Negative for rash, itching, cyanotic skin color change.     - Neurological: Negative for dizziness, tingling, tremors, focal sensory deficit, focal weakness and headaches.           Exam:    /70 (BP Location: Left arm, Patient Position: Sitting, BP Cuff Size: Adult)   Pulse 73   Temp 36.4 °C (97.6 °F)   Resp 14   Ht 1.537 m (5' 0.5\")   SpO2 98%  Body mass index is 32.27 kg/m².    General:  Well nourished, well developed female in NAD  Head is grossly normal.  Neck: Supple without JVD or bruit. Thyroid is not enlarged.  Pulmonary: Clear to ausculation and percussion.  Normal effort. No rales, ronchi, or wheezing.  Cardiovascular: Regular rate and rhythm without murmur. Carotid and radial pulses are intact and equal bilaterally.  Extremities: no clubbing, cyanosis, or edema.  Monofilament testing with a 10 gram force: sensation intact: intact bilaterally  Visual Inspection: Feet with maceration, ulcers, fissures.  Pedal pulses: decreased on left    Please note that this dictation was created using voice recognition software. I have made every reasonable attempt to correct obvious errors, but I expect that there are errors of grammar and possibly content that I did not discover before finalizing the note.    Assessment/Plan:  1. Mixed hyperlipidemia  Continue statin medication, LDL < 100, ideal would be < 70 but she has not tolerated higher intensity statins.  Consider coQ10, continue vitamin D    2. Benign essential HTN  This is at goal on 3 medications, continue current doses.    3. Uncontrolled type 2 diabetes mellitus with hyperglycemia (HCC)  Her A1c is at goal for her, though her regimen would preferable include a GLP-1 rather than glimepiride.  Cost has been an issue with medications in the past.  I have encouraged her to check with her insurance on preferred GLP-1 agonists and get samples from pharmacy when she knows what it is and if cost is reasonable.  - HEMOGLOBIN A1C; Future    4. Decreased " GFR  This may be gradual decline due to age/diabetes/HTN.  We will repeat labs in 1-3 months to rule out an inflammatory process or rapid decline then monitor every 6-12 months.  Avoid nephrotoxic medications, continue ACE inhibitor and BP and glycemic control.  - URINALYSIS; Future  - Basic Metabolic Panel; Future

## 2019-07-18 NOTE — ASSESSMENT & PLAN NOTE
Recent labs show a first time decrease in GFR at 54.  Microalbumin creatinine ratio is elevated but there is no proteinuria.  She denies symptoms of kidney stones or retention.  She is on lisinopril and her BP is controlled.  She has not been dehydrated.

## 2019-09-03 ENCOUNTER — OFFICE VISIT (OUTPATIENT)
Dept: MEDICAL GROUP | Facility: PHYSICIAN GROUP | Age: 74
End: 2019-09-03
Payer: MEDICARE

## 2019-09-03 ENCOUNTER — APPOINTMENT (OUTPATIENT)
Dept: RADIOLOGY | Facility: IMAGING CENTER | Age: 74
End: 2019-09-03
Attending: FAMILY MEDICINE
Payer: MEDICARE

## 2019-09-03 ENCOUNTER — APPOINTMENT (OUTPATIENT)
Dept: URGENT CARE | Facility: CLINIC | Age: 74
End: 2019-09-03
Payer: MEDICARE

## 2019-09-03 VITALS
TEMPERATURE: 97 F | HEART RATE: 66 BPM | RESPIRATION RATE: 20 BRPM | DIASTOLIC BLOOD PRESSURE: 76 MMHG | OXYGEN SATURATION: 99 % | SYSTOLIC BLOOD PRESSURE: 140 MMHG

## 2019-09-03 DIAGNOSIS — R29.898 LEG WEAKNESS, BILATERAL: ICD-10-CM

## 2019-09-03 DIAGNOSIS — M54.42 ACUTE LEFT-SIDED LOW BACK PAIN WITH LEFT-SIDED SCIATICA: ICD-10-CM

## 2019-09-03 PROCEDURE — 72110 X-RAY EXAM L-2 SPINE 4/>VWS: CPT | Mod: TC | Performed by: PHYSICIAN ASSISTANT

## 2019-09-03 PROCEDURE — 99214 OFFICE O/P EST MOD 30 MIN: CPT | Performed by: FAMILY MEDICINE

## 2019-09-03 NOTE — ASSESSMENT & PLAN NOTE
She describes an episode starting 5 days ago of bilateral leg weakness.  She was not able to walk without an assistive device upon awakening one morning.  The symptoms gradually improved and she is here without a cane today.  She denies numbness in her leg, swelling, rash or joint swelling.

## 2019-09-03 NOTE — ASSESSMENT & PLAN NOTE
She has had 5 days of left sided low back pain that is described as dull pain with radiation to her left knee described as burning.  It started suddenly overnight without any injury she is aware of.  She had weakness in her legs the first few days and she required a cane to get around.  She has not noted any numbness.  She has a history of lumbar disease treated with surgery.  NO known history of cancer, and no symptoms of an infection.

## 2019-10-03 DIAGNOSIS — E11.9 CONTROLLED TYPE 2 DIABETES MELLITUS WITHOUT COMPLICATION, WITHOUT LONG-TERM CURRENT USE OF INSULIN (HCC): ICD-10-CM

## 2019-10-03 DIAGNOSIS — I10 BENIGN ESSENTIAL HTN: ICD-10-CM

## 2019-10-03 DIAGNOSIS — E78.2 MIXED HYPERLIPIDEMIA: ICD-10-CM

## 2019-10-03 RX ORDER — PRAVASTATIN SODIUM 10 MG
10 TABLET ORAL
Qty: 100 TAB | Refills: 0 | Status: SHIPPED | OUTPATIENT
Start: 2019-10-03 | End: 2020-01-09

## 2019-10-03 NOTE — TELEPHONE ENCOUNTER
Was the patient seen in the last year in this department? Yes     Does patient have an active prescription for medications requested? Yes     Received Request Via: Pharmacy      Last seen 9-3-2019  Last labs 7-

## 2019-10-04 NOTE — TELEPHONE ENCOUNTER
I have reviewed the patient's chart and she is up to date on appointments and labs.  Refills are appropriate and were signed.

## 2019-11-19 ENCOUNTER — HOSPITAL ENCOUNTER (OUTPATIENT)
Dept: LAB | Facility: MEDICAL CENTER | Age: 74
End: 2019-11-19
Attending: FAMILY MEDICINE
Payer: MEDICARE

## 2019-11-19 ENCOUNTER — OFFICE VISIT (OUTPATIENT)
Dept: MEDICAL GROUP | Facility: PHYSICIAN GROUP | Age: 74
End: 2019-11-19
Payer: MEDICARE

## 2019-11-19 VITALS
OXYGEN SATURATION: 98 % | HEART RATE: 59 BPM | DIASTOLIC BLOOD PRESSURE: 68 MMHG | SYSTOLIC BLOOD PRESSURE: 128 MMHG | HEIGHT: 61 IN | RESPIRATION RATE: 18 BRPM | TEMPERATURE: 97.6 F | BODY MASS INDEX: 31.74 KG/M2

## 2019-11-19 DIAGNOSIS — I10 BENIGN ESSENTIAL HTN: ICD-10-CM

## 2019-11-19 DIAGNOSIS — E11.9 CONTROLLED TYPE 2 DIABETES MELLITUS WITHOUT COMPLICATION, WITHOUT LONG-TERM CURRENT USE OF INSULIN (HCC): ICD-10-CM

## 2019-11-19 DIAGNOSIS — R94.4 DECREASED GFR: ICD-10-CM

## 2019-11-19 DIAGNOSIS — E78.5 DYSLIPIDEMIA: ICD-10-CM

## 2019-11-19 PROBLEM — E78.2 MIXED HYPERLIPIDEMIA: Status: RESOLVED | Noted: 2018-05-21 | Resolved: 2019-11-19

## 2019-11-19 PROBLEM — E11.65 UNCONTROLLED TYPE 2 DIABETES MELLITUS WITH HYPERGLYCEMIA (HCC): Status: RESOLVED | Noted: 2018-05-21 | Resolved: 2019-11-19

## 2019-11-19 PROBLEM — M79.10 MYALGIA: Status: RESOLVED | Noted: 2019-07-18 | Resolved: 2019-11-19

## 2019-11-19 LAB
ANION GAP SERPL CALC-SCNC: 8 MMOL/L (ref 0–11.9)
APPEARANCE UR: CLEAR
BILIRUB UR QL STRIP.AUTO: NEGATIVE
BUN SERPL-MCNC: 13 MG/DL (ref 8–22)
CALCIUM SERPL-MCNC: 9.7 MG/DL (ref 8.5–10.5)
CHLORIDE SERPL-SCNC: 103 MMOL/L (ref 96–112)
CO2 SERPL-SCNC: 27 MMOL/L (ref 20–33)
COLOR UR: YELLOW
CREAT SERPL-MCNC: 0.79 MG/DL (ref 0.5–1.4)
GLUCOSE SERPL-MCNC: 90 MG/DL (ref 65–99)
GLUCOSE UR STRIP.AUTO-MCNC: NEGATIVE MG/DL
KETONES UR STRIP.AUTO-MCNC: NEGATIVE MG/DL
LEUKOCYTE ESTERASE UR QL STRIP.AUTO: NEGATIVE
MICRO URNS: NORMAL
NITRITE UR QL STRIP.AUTO: NEGATIVE
PH UR STRIP.AUTO: 5.5 [PH] (ref 5–8)
POTASSIUM SERPL-SCNC: 4.7 MMOL/L (ref 3.6–5.5)
PROT UR QL STRIP: NEGATIVE MG/DL
RBC UR QL AUTO: NEGATIVE
SODIUM SERPL-SCNC: 138 MMOL/L (ref 135–145)
SP GR UR STRIP.AUTO: 1.01
UROBILINOGEN UR STRIP.AUTO-MCNC: 0.2 MG/DL

## 2019-11-19 PROCEDURE — 99214 OFFICE O/P EST MOD 30 MIN: CPT | Performed by: FAMILY MEDICINE

## 2019-11-19 PROCEDURE — 83036 HEMOGLOBIN GLYCOSYLATED A1C: CPT | Mod: GA

## 2019-11-19 PROCEDURE — 81003 URINALYSIS AUTO W/O SCOPE: CPT

## 2019-11-19 PROCEDURE — 36415 COLL VENOUS BLD VENIPUNCTURE: CPT | Mod: GA

## 2019-11-19 PROCEDURE — 80048 BASIC METABOLIC PNL TOTAL CA: CPT

## 2019-11-19 NOTE — ASSESSMENT & PLAN NOTE
She is on pravastatin 10 mg and is tolerating it.  She did not tolerate other statins due to myalgias.    Lab Results   Component Value Date/Time    CHOLSTRLTOT 189 07/15/2019 07:41 AM    CHOLSTRLTOT 172 07/09/2018 07:10 AM    LDL 96 07/15/2019 07:41 AM    LDL 88 07/09/2018 07:10 AM    HDL 38 (A) 07/15/2019 07:41 AM    HDL 32 (A) 07/09/2018 07:10 AM    TRIGLYCERIDE 276 (H) 07/15/2019 07:41 AM    TRIGLYCERIDE 262 (H) 07/09/2018 07:10 AM

## 2019-11-19 NOTE — ASSESSMENT & PLAN NOTE
Her blood sugars have been better.  They are running from , no higher than 200.  She is on glimepiride 4 mg bid and metformin 850 three times a day. She denies any hypoglycemia.  She reports she is feeling ok.

## 2019-11-19 NOTE — ASSESSMENT & PLAN NOTE
Lab Results   Component Value Date/Time    CHOLSTRLTOT 189 07/15/2019 07:41 AM    CHOLSTRLTOT 172 07/09/2018 07:10 AM    LDL 96 07/15/2019 07:41 AM    LDL 88 07/09/2018 07:10 AM    HDL 38 (A) 07/15/2019 07:41 AM    HDL 32 (A) 07/09/2018 07:10 AM    TRIGLYCERIDE 276 (H) 07/15/2019 07:41 AM    TRIGLYCERIDE 262 (H) 07/09/2018 07:10 AM       She is on pravastatin 10 mg and tolerating it.  She has not tolerated other statins due to myalgia.

## 2019-11-19 NOTE — PROGRESS NOTES
CC:     HISTORY OF PRESENT ILLNESS: Patient is a 74 y.o. female established patient who presents today to discuss the following chronic conditions    Health Maintenance: Completed    Controlled type 2 diabetes mellitus without complication, without long-term current use of insulin ([facility])  Her blood sugars have been better.  They are running from , no higher than 200.  She is on glimepiride 4 mg bid and metformin 850 three times a day. She denies any hypoglycemia.  She reports she is feeling ok.    Benign essential HTN  Her BP is normal today.  Amlodipine and atenolol and lisinsopril.  She is taking them regularly.    Myalgia  She is on pravastatin 10 mg and is tolerating it.  She did not tolerate other statins due to myalgias.    Lab Results   Component Value Date/Time    CHOLSTRLTOT 189 07/15/2019 07:41 AM    CHOLSTRLTOT 172 07/09/2018 07:10 AM    LDL 96 07/15/2019 07:41 AM    LDL 88 07/09/2018 07:10 AM    HDL 38 (A) 07/15/2019 07:41 AM    HDL 32 (A) 07/09/2018 07:10 AM    TRIGLYCERIDE 276 (H) 07/15/2019 07:41 AM    TRIGLYCERIDE 262 (H) 07/09/2018 07:10 AM           Dyslipidemia  Lab Results   Component Value Date/Time    CHOLSTRLTOT 189 07/15/2019 07:41 AM    CHOLSTRLTOT 172 07/09/2018 07:10 AM    LDL 96 07/15/2019 07:41 AM    LDL 88 07/09/2018 07:10 AM    HDL 38 (A) 07/15/2019 07:41 AM    HDL 32 (A) 07/09/2018 07:10 AM    TRIGLYCERIDE 276 (H) 07/15/2019 07:41 AM    TRIGLYCERIDE 262 (H) 07/09/2018 07:10 AM       She is on pravastatin 10 mg and tolerating it.  She has not tolerated other statins due to myalgia.      Patient Active Problem List    Diagnosis Date Noted   • Controlled type 2 diabetes mellitus without complication, without long-term current use of insulin ([facility]) 11/19/2019   • Dyslipidemia 11/19/2019   • Acute left-sided low back pain with left-sided sciatica 09/03/2019   • Leg weakness, bilateral 09/03/2019   • Decreased GFR 07/18/2019   • Neuropathy ([facility]) 01/17/2019   • Vitamin D deficiency  01/17/2019   • Obesity (BMI 30-39.9) 07/17/2018   • Benign essential HTN 05/21/2018      Allergies:Percocet [oxycodone-acetaminophen]; Demerol; and Statins [hmg-coa-r inhibitors]    Current Outpatient Medications   Medication Sig Dispense Refill   • pravastatin (PRAVACHOL) 10 MG Tab Take 1 Tab by mouth every day. 100 Tab 0   • glimepiride (AMARYL) 4 MG Tab TAKE 1 TABLET TWICE DAILY 180 Tab 0   • atenolol (TENORMIN) 50 MG Tab TAKE 2 TABLETS EVERY  Tab 0   • metFORMIN (GLUCOPHAGE) 850 MG Tab TAKE 1 TABLET THREE TIMES DAILY 270 Tab 0   • gabapentin (NEURONTIN) 300 MG Cap TAKE 1 CAPSULE AT BEDTIME 90 Cap 1   • amLODIPine (NORVASC) 10 MG Tab TAKE 1 TABLET EVERY DAY 90 Tab 1   • lisinopril (PRINIVIL, ZESTRIL) 40 MG tablet TAKE 1 TABLET EVERY DAY 90 Tab 1   • vitamin D (CHOLECALCIFEROL) 1000 UNIT Tab Take 1,000 Units by mouth every day.     • aspirin EC (ECOTRIN) 81 MG Tablet Delayed Response Take 81 mg by mouth every day.     • glucose blood strip 1 Each by Other route as needed.     • STOOL SOFTENER PO Take 1 Tab by mouth every day.       No current facility-administered medications for this visit.        Social History     Tobacco Use   • Smoking status: Never Smoker   • Smokeless tobacco: Never Used   Substance Use Topics   • Alcohol use: No   • Drug use: No     Social History     Patient does not qualify to have social determinant information on file (likely too young).   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     - Respiratory: Negative for cough, sputum production, chest congestion, dyspnea, wheezing, and crackles.      - Cardiovascular: Negative for chest pain, palpitations, orthopnea, and bilateral lower extremity edema.     - Gastrointestinal: Negative for heartburn, nausea, vomiting, abdominal pain, hematochezia,  "melena, diarrhea, constipation, and greasy/foul-smelling stools.         Exam:    /68 (BP Location: Left arm, Patient Position: Sitting, BP Cuff Size: Adult)   Pulse (!) 59   Temp 36.4 °C (97.6 °F) (Temporal)   Resp 18   Ht 1.549 m (5' 1\")   SpO2 98%  Body mass index is 31.74 kg/m².    General:  Well nourished, well developed female in NAD  Head is grossly normal.  Neck: Supple without JVD or bruit. Thyroid is not enlarged.  Pulmonary: Clear to ausculation and percussion.  Normal effort. No rales, ronchi, or wheezing.  Cardiovascular: Regular rate and rhythm without murmur. Carotid and radial pulses are intact and equal bilaterally.  Extremities: no clubbing, cyanosis, or edema.          Assessment/Plan:  1. Controlled type 2 diabetes mellitus without complication, without long-term current use of insulin (HCC)  Home glucose levels are reasonable recently.  Continue oral medications and repeat HgA1c.    - HEMOGLOBIN A1C; Future    2. Benign essential HTN  This is at goal, continue atenolol, lisinopril and amlodipine    3. Decreased GFR  Need follow up labs and UA.  If still decreased will get an US to rule out structural causes.  This is most likely related to diabetes and hypertension, last microalbumin/cr ration was slightly high.  - Basic Metabolic Panel; Future  - URINALYSIS,CULTURE IF INDICATED; Future    4. Dyslipidemia  Continue pravastatin, goal LDL is 70 or less, does not tolerate higher intensity statins so will continue with lifestyle changes only.         "

## 2019-11-20 LAB
EST. AVERAGE GLUCOSE BLD GHB EST-MCNC: 189 MG/DL
HBA1C MFR BLD: 8.2 % (ref 0–5.6)

## 2020-03-20 ENCOUNTER — OFFICE VISIT (OUTPATIENT)
Dept: MEDICAL GROUP | Facility: PHYSICIAN GROUP | Age: 75
End: 2020-03-20
Payer: MEDICARE

## 2020-03-20 VITALS
DIASTOLIC BLOOD PRESSURE: 82 MMHG | TEMPERATURE: 97 F | OXYGEN SATURATION: 98 % | HEART RATE: 71 BPM | SYSTOLIC BLOOD PRESSURE: 126 MMHG | HEIGHT: 61 IN | RESPIRATION RATE: 16 BRPM | BODY MASS INDEX: 31.74 KG/M2

## 2020-03-20 DIAGNOSIS — I10 BENIGN ESSENTIAL HTN: ICD-10-CM

## 2020-03-20 DIAGNOSIS — Z76.0 MEDICATION REFILL: ICD-10-CM

## 2020-03-20 DIAGNOSIS — E78.2 MIXED HYPERLIPIDEMIA: ICD-10-CM

## 2020-03-20 DIAGNOSIS — E11.65 UNCONTROLLED TYPE 2 DIABETES MELLITUS WITH HYPERGLYCEMIA (HCC): ICD-10-CM

## 2020-03-20 DIAGNOSIS — E11.9 CONTROLLED TYPE 2 DIABETES MELLITUS WITHOUT COMPLICATION, WITHOUT LONG-TERM CURRENT USE OF INSULIN (HCC): ICD-10-CM

## 2020-03-20 DIAGNOSIS — G62.9 NEUROPATHY: ICD-10-CM

## 2020-03-20 PROCEDURE — 99213 OFFICE O/P EST LOW 20 MIN: CPT | Performed by: NURSE PRACTITIONER

## 2020-03-20 RX ORDER — ATENOLOL 50 MG/1
50 TABLET ORAL 2 TIMES DAILY
Qty: 180 TAB | Refills: 0 | Status: SHIPPED | OUTPATIENT
Start: 2020-03-20 | End: 2020-06-25

## 2020-03-20 RX ORDER — AMLODIPINE BESYLATE 10 MG/1
10 TABLET ORAL
Qty: 90 TAB | Refills: 0 | Status: SHIPPED | OUTPATIENT
Start: 2020-03-20 | End: 2020-06-25

## 2020-03-20 RX ORDER — GLIMEPIRIDE 4 MG/1
4 TABLET ORAL 2 TIMES DAILY
Qty: 180 TAB | Refills: 0 | Status: SHIPPED | OUTPATIENT
Start: 2020-03-20 | End: 2021-02-26 | Stop reason: SDUPTHER

## 2020-03-20 RX ORDER — GABAPENTIN 300 MG/1
CAPSULE ORAL
Qty: 90 CAP | Refills: 0 | Status: SHIPPED | OUTPATIENT
Start: 2020-03-20 | End: 2021-02-26 | Stop reason: SDUPTHER

## 2020-03-20 RX ORDER — LISINOPRIL 40 MG/1
40 TABLET ORAL
Qty: 90 TAB | Refills: 0 | Status: SHIPPED | OUTPATIENT
Start: 2020-03-20 | End: 2020-06-25

## 2020-03-20 RX ORDER — PRAVASTATIN SODIUM 10 MG
10 TABLET ORAL
Qty: 90 TAB | Refills: 0 | Status: SHIPPED | OUTPATIENT
Start: 2020-03-20 | End: 2021-02-26 | Stop reason: SDUPTHER

## 2020-03-20 ASSESSMENT — PATIENT HEALTH QUESTIONNAIRE - PHQ9: CLINICAL INTERPRETATION OF PHQ2 SCORE: 0

## 2020-03-20 NOTE — ASSESSMENT & PLAN NOTE
New patient to me.  Patient is here for medication refill, she would like a 3-month supply.  She reports compliance with all her medications, has been in all of his medications for a long time, tolerating without side effects.  No recent changes in meds or doses.  All her vitals today are normal.

## 2020-03-20 NOTE — PROGRESS NOTES
Chief Complaint   Patient presents with   • Follow-Up     med refills       HISTORY OF PRESENT ILLNESS: Patient is a 74 y.o. female, established patient who presents today to discuss medical problems as listed below:      Medication refill  New patient to me.  Patient is here for medication refill, she would like a 3-month supply.  She reports compliance with all her medications, has been in all of his medications for a long time, tolerating without side effects.  No recent changes in meds or doses.  All her vitals today are normal.      Patient Active Problem List    Diagnosis Date Noted   • Medication refill 03/20/2020   • Controlled type 2 diabetes mellitus without complication, without long-term current use of insulin ([facility]) 11/19/2019   • Dyslipidemia 11/19/2019   • Acute left-sided low back pain with left-sided sciatica 09/03/2019   • Leg weakness, bilateral 09/03/2019   • Decreased GFR 07/18/2019   • Neuropathy (HCC) 01/17/2019   • Vitamin D deficiency 01/17/2019   • Obesity (BMI 30-39.9) 07/17/2018   • Benign essential HTN 05/21/2018        Allergies: Percocet [oxycodone-acetaminophen]; Demerol; and Statins [hmg-coa-r inhibitors]    Current Outpatient Medications   Medication Sig Dispense Refill   • lisinopril (PRINIVIL) 40 MG tablet Take 1 Tab by mouth every day. 90 Tab 0   • gabapentin (NEURONTIN) 300 MG Cap TAKE 1 CAPSULE AT BEDTIME 90 Cap 0   • atenolol (TENORMIN) 50 MG Tab Take 1 Tab by mouth 2 times a day. 180 Tab 0   • metFORMIN (GLUCOPHAGE) 850 MG Tab TAKE 1 TABLET THREE TIMES DAILY 270 Tab 0   • amLODIPine (NORVASC) 10 MG Tab Take 1 Tab by mouth every day. 90 Tab 0   • pravastatin (PRAVACHOL) 10 MG Tab Take 1 Tab by mouth every day. 90 Tab 0   • glimepiride (AMARYL) 4 MG Tab Take 1 Tab by mouth 2 times a day. 180 Tab 0   • vitamin D (CHOLECALCIFEROL) 1000 UNIT Tab Take 1,000 Units by mouth every day.     • aspirin EC (ECOTRIN) 81 MG Tablet Delayed Response Take 81 mg by mouth every day.     • glucose  "blood strip 1 Each by Other route as needed.     • STOOL SOFTENER PO Take 1 Tab by mouth every day.       No current facility-administered medications for this visit.        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    Review of Systems:     - Constitutional: Negative for fever, chills, unexpected weight change, and fatigue/generalized weakness.     - HEENT: Negative for headaches, vision changes, hearing changes, ear pain, ear discharge, rhinorrhea, sinus congestion, sore throat, and neck pain.      - Respiratory: Negative for cough, sputum production, chest congestion, dyspnea, wheezing, and crackles.      - Cardiovascular: Negative for chest pain, palpitations, orthopnea, and bilateral lower extremity edema.     - Psychiatric/Behavioral: Negative for depression, suicidal/homicidal ideation and memory loss.      All other systems reviewed and are negative    Exam:    /82 (BP Location: Left arm, Patient Position: Sitting, BP Cuff Size: Adult)   Pulse 71   Temp 36.1 °C (97 °F) (Temporal)   Resp 16   Ht 1.549 m (5' 1\")   SpO2 98%   BMI 31.74 kg/m²  Body mass index is 31.74 kg/m².    Physical Exam:  Constitutional: Well-developed and well-nourished. Not diaphoretic. No distress.   Cardiovascular: Regular rate and rhythm, S1 and S2 without murmur, rubs, or gallops.    Chest: Effort normal. Clear to auscultation throughout. No adventitious sounds.   Neurological: Alert and oriented x 3.   Psychiatric:  Behavior, mood, and affect are appropriate.    LABS: 11/19/19 results reviewed and discussed with the patient, questions answered.    Patient was seen for 15 " minutes face to face of which > 50% of appointment time was spent on counseling and coordination of care regarding the above.     Assessment/Plan:  1. Controlled type 2 diabetes mellitus without complication, without long-term current use of insulin (HCC)  - lisinopril (PRINIVIL) 40 MG tablet; Take 1 Tab by mouth every day.  Dispense: 90 Tab; Refill: 0  - amLODIPine (NORVASC) 10 MG Tab; Take 1 Tab by mouth every day.  Dispense: 90 Tab; Refill: 0  - pravastatin (PRAVACHOL) 10 MG Tab; Take 1 Tab by mouth every day.  Dispense: 90 Tab; Refill: 0    2. Benign essential HTN  - lisinopril (PRINIVIL) 40 MG tablet; Take 1 Tab by mouth every day.  Dispense: 90 Tab; Refill: 0  - atenolol (TENORMIN) 50 MG Tab; Take 1 Tab by mouth 2 times a day.  Dispense: 180 Tab; Refill: 0  - amLODIPine (NORVASC) 10 MG Tab; Take 1 Tab by mouth every day.  Dispense: 90 Tab; Refill: 0  - pravastatin (PRAVACHOL) 10 MG Tab; Take 1 Tab by mouth every day.  Dispense: 90 Tab; Refill: 0    3. Mixed hyperlipidemia  - lisinopril (PRINIVIL) 40 MG tablet; Take 1 Tab by mouth every day.  Dispense: 90 Tab; Refill: 0  - amLODIPine (NORVASC) 10 MG Tab; Take 1 Tab by mouth every day.  Dispense: 90 Tab; Refill: 0  - pravastatin (PRAVACHOL) 10 MG Tab; Take 1 Tab by mouth every day.  Dispense: 90 Tab; Refill: 0    4. Neuropathy  - gabapentin (NEURONTIN) 300 MG Cap; TAKE 1 CAPSULE AT BEDTIME  Dispense: 90 Cap; Refill: 0    5. Uncontrolled type 2 diabetes mellitus with hyperglycemia (HCC)  - metFORMIN (GLUCOPHAGE) 850 MG Tab; TAKE 1 TABLET THREE TIMES DAILY  Dispense: 270 Tab; Refill: 0  - glimepiride (AMARYL) 4 MG Tab; Take 1 Tab by mouth 2 times a day.  Dispense: 180 Tab; Refill: 0    6. Medication refill       Discussed with patient possible alternative diagnoses, pt is to take all medications as prescribed. If symptoms persist FU w/PCP, if symptoms worsen go to emergency room. If experiencing any side effects from prescribed medications reports to the office  immediately or go to emergency room.  Reviewed indication, dosage, usage and potential adverse effects of prescribed medications. Reviewed risks and benefits of treatment plan. Patient verbalizes understanding of all instruction and verbally agrees to plan.    Return if symptoms worsen or fail to improve.

## 2020-07-06 ENCOUNTER — OFFICE VISIT (OUTPATIENT)
Dept: MEDICAL GROUP | Facility: PHYSICIAN GROUP | Age: 75
End: 2020-07-06
Payer: MEDICARE

## 2020-07-06 VITALS
HEIGHT: 61 IN | OXYGEN SATURATION: 98 % | TEMPERATURE: 97.5 F | WEIGHT: 146 LBS | RESPIRATION RATE: 20 BRPM | DIASTOLIC BLOOD PRESSURE: 80 MMHG | BODY MASS INDEX: 27.56 KG/M2 | SYSTOLIC BLOOD PRESSURE: 150 MMHG | HEART RATE: 60 BPM

## 2020-07-06 DIAGNOSIS — E11.9 CONTROLLED TYPE 2 DIABETES MELLITUS WITHOUT COMPLICATION, WITHOUT LONG-TERM CURRENT USE OF INSULIN (HCC): ICD-10-CM

## 2020-07-06 DIAGNOSIS — Z12.11 SCREENING FOR COLORECTAL CANCER: ICD-10-CM

## 2020-07-06 DIAGNOSIS — Z12.12 SCREENING FOR COLORECTAL CANCER: ICD-10-CM

## 2020-07-06 DIAGNOSIS — E78.5 DYSLIPIDEMIA: ICD-10-CM

## 2020-07-06 DIAGNOSIS — R94.4 DECREASED GFR: ICD-10-CM

## 2020-07-06 DIAGNOSIS — I10 BENIGN ESSENTIAL HTN: ICD-10-CM

## 2020-07-06 DIAGNOSIS — Z11.59 NEED FOR HEPATITIS C SCREENING TEST: ICD-10-CM

## 2020-07-06 LAB
HBA1C MFR BLD: 7.6 % (ref 0–5.6)
INT CON NEG: NEGATIVE
INT CON POS: POSITIVE

## 2020-07-06 PROCEDURE — 99214 OFFICE O/P EST MOD 30 MIN: CPT | Performed by: PHYSICIAN ASSISTANT

## 2020-07-06 PROCEDURE — 83036 HEMOGLOBIN GLYCOSYLATED A1C: CPT | Performed by: PHYSICIAN ASSISTANT

## 2020-07-06 ASSESSMENT — FIBROSIS 4 INDEX: FIB4 SCORE: 1.22

## 2020-07-06 NOTE — PROGRESS NOTES
CC:    Chief Complaint   Patient presents with   • Establish Care       HISTORY OF THE PRESENT ILLNESS: Patient is a 74 y.o. female presenting to establish primary care     1. Pt's A1C today is 7.9%. She is on metformin and glimepiride. She will be making eye exam soon. Her last GFR mildly decreased with mild microalbuminuria present as well.   2. Pt on 3 BP medications. She states she has had elevated BP since early 20s. Her BP elevated today but past several office visits have been excellent.   3. Pt had back surgery in 2010 and needs gabapentin at bedtime for pain relief. Her current dose is working well.   4. Pt on pravachol for HLD.   5. Pt due for mammo this year with nadia castillo.   6. Pt declines DEXA.     No problem-specific Assessment & Plan notes found for this encounter.    Allergies: Percocet [oxycodone-acetaminophen]; Demerol; and Statins [hmg-coa-r inhibitors]    Current Outpatient Medications Ordered in Epic   Medication Sig Dispense Refill   • amLODIPine (NORVASC) 10 MG Tab TAKE 1 TABLET EVERY DAY 90 Tab 0   • lisinopril (PRINIVIL) 40 MG tablet TAKE 1 TABLET EVERY DAY 90 Tab 0   • atenolol (TENORMIN) 50 MG Tab TAKE 1 TABLET TWICE DAILY 180 Tab 0   • gabapentin (NEURONTIN) 300 MG Cap TAKE 1 CAPSULE AT BEDTIME 90 Cap 0   • metFORMIN (GLUCOPHAGE) 850 MG Tab TAKE 1 TABLET THREE TIMES DAILY 270 Tab 0   • pravastatin (PRAVACHOL) 10 MG Tab Take 1 Tab by mouth every day. 90 Tab 0   • glimepiride (AMARYL) 4 MG Tab Take 1 Tab by mouth 2 times a day. 180 Tab 0   • vitamin D (CHOLECALCIFEROL) 1000 UNIT Tab Take 1,000 Units by mouth every day.     • aspirin EC (ECOTRIN) 81 MG Tablet Delayed Response Take 81 mg by mouth every day.     • glucose blood strip 1 Each by Other route as needed.     • STOOL SOFTENER PO Take 1 Tab by mouth every day.       No current [facility]-ordered facility-administered medications on file.        Past Medical History:   Diagnosis Date   • Anesthesia    • Backpain    • Dental disorder    •  Diabetes    • Heart murmur    • Hypertension    • Pain        Past Surgical History:   Procedure Laterality Date   • LUMBAR FUSION POSTERIOR  1/11/2010    Performed by LYNNETTE SMILEY at SURGERY [facility]   • LAMINOTOMY  1/11/2010    Performed by LYNNETTE SMILEY at SURGERY [facility]   • LUMBAR LAMINECTOMY DISKECTOMY  8/31/2009   • OTHER ORTHOPEDIC SURGERY  1989    tibia has a tanna knee to ankle, fibula is resected   • PRIMARY C SECTION  1988   • OTHER  1975    left lumpectomt in breast - benign   • OTHER         Social History     Tobacco Use   • Smoking status: Never Smoker   • Smokeless tobacco: Never Used   Substance Use Topics   • Alcohol use: No   • Drug use: No       Social History     Social History Narrative    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     - HEENT: Negative for headaches, vision changes, hearing changes, ear pain, ear discharge, rhinorrhea, sinus congestion, sore throat, and neck pain.      - Respiratory: Negative for cough, sputum production, chest congestion, dyspnea, wheezing, and crackles.      - Cardiovascular: Negative for chest pain, palpitations, orthopnea, and bilateral lower extremity edema.     - Gastrointestinal: Negative for heartburn, nausea, vomiting, abdominal pain, hematochezia, melena, diarrhea, constipation, and greasy/foul-smelling stools.     - Genitourinary: Negative for dysuria, polyuria, hematuria, pyuria, urinary urgency, and urinary incontinence.     - Musculoskeletal: Positive for LBP.  Negative for myalgias,  and joint pain.     - Skin: Negative for rash, itching, cyanotic skin color change.     - Neurological: Negative for dizziness, tingling, tremors, focal sensory deficit, focal weakness and headaches.     - Endo/Heme/Allergies: Does not bruise/bleed easily.     - Psychiatric/Behavioral: Negative for  "depression, suicidal/homicidal ideation and memory loss.        - NOTE: All other systems reviewed and are negative, except as in HPI.      .      Exam: /80 (BP Location: Left arm, Patient Position: Sitting, BP Cuff Size: Adult)   Pulse 60   Temp 36.4 °C (97.5 °F) (Temporal)   Resp 20   Ht 1.549 m (5' 1\")   Wt 66.2 kg (146 lb)   SpO2 98%  Body mass index is 27.59 kg/m².    General: Normal appearing. No acute distress.  Skin: Warm and dry.  No obvious lesions.  HEENT: Normocephalic. Eyes conjunctiva clear lids without ptosis, ears normal shape and contour  Cardiovascular: Regular rate and rhythm without murmur.   Respiratory: Clear to auscultation bilaterally, no rhonchi wheezing or rales.  Neurologic: Grossly nonfocal, A&O x3, gait normal,  Musculoskeletal: No deformity or swelling.   Extremities: No extremity cyanosis, clubbing, or edema.  Psych: Normal mood and affect. Judgment and insight is normal.    Please note that this dictation was created using voice recognition software. I have made every reasonable attempt to correct obvious errors, but I expect that there are errors of grammar and possibly content that I did not discover before finalizing the note.    LABS: 2020: Results reviewed and discussed with the patient, questions answered.      Assessment/Plan  1. Need for hepatitis C screening test    - HCV Scrn ( [id number] 1xLife); Future    2. Screening for colorectal cancer  Order sent  - [facility] (FIT DNA)    3. Controlled type 2 diabetes mellitus without complication, without long-term current use of insulin (HCC)  Her DM is improving so we will continue with current dose of metformin and glimepiride. Recheck again in 6 months.   - POCT Hemoglobin A1C  - CBC WITH DIFFERENTIAL; Future  - Comp Metabolic Panel; Future    4. Benign essential HTN  BP elevated today but has been previously excellent. Will continue to monitor    5. Dyslipidemia  Well controlled.   - Lipid Profile; Future    6. " Decreased GFR  Will continue to monitor.

## 2020-07-16 ENCOUNTER — HOSPITAL ENCOUNTER (OUTPATIENT)
Dept: LAB | Facility: MEDICAL CENTER | Age: 75
End: 2020-07-16
Attending: PHYSICIAN ASSISTANT
Payer: MEDICARE

## 2020-07-16 DIAGNOSIS — Z11.59 NEED FOR HEPATITIS C SCREENING TEST: ICD-10-CM

## 2020-07-16 DIAGNOSIS — E78.5 DYSLIPIDEMIA: ICD-10-CM

## 2020-07-16 DIAGNOSIS — E11.9 CONTROLLED TYPE 2 DIABETES MELLITUS WITHOUT COMPLICATION, WITHOUT LONG-TERM CURRENT USE OF INSULIN (HCC): ICD-10-CM

## 2020-07-16 LAB
ALBUMIN SERPL BCP-MCNC: 4.7 G/DL (ref 3.2–4.9)
ALBUMIN/GLOB SERPL: 1.9 G/DL
ALP SERPL-CCNC: 65 U/L (ref 30–99)
ALT SERPL-CCNC: 26 U/L (ref 2–50)
ANION GAP SERPL CALC-SCNC: 16 MMOL/L (ref 7–16)
AST SERPL-CCNC: 19 U/L (ref 12–45)
BASOPHILS # BLD AUTO: 1.4 % (ref 0–1.8)
BASOPHILS # BLD: 0.09 K/UL (ref 0–0.12)
BILIRUB SERPL-MCNC: 0.5 MG/DL (ref 0.1–1.5)
BUN SERPL-MCNC: 22 MG/DL (ref 8–22)
CALCIUM SERPL-MCNC: 9.7 MG/DL (ref 8.5–10.5)
CHLORIDE SERPL-SCNC: 102 MMOL/L (ref 96–112)
CHOLEST SERPL-MCNC: 185 MG/DL (ref 100–199)
CO2 SERPL-SCNC: 23 MMOL/L (ref 20–33)
CREAT SERPL-MCNC: 0.83 MG/DL (ref 0.5–1.4)
EOSINOPHIL # BLD AUTO: 0.23 K/UL (ref 0–0.51)
EOSINOPHIL NFR BLD: 3.5 % (ref 0–6.9)
ERYTHROCYTE [DISTWIDTH] IN BLOOD BY AUTOMATED COUNT: 40.8 FL (ref 35.9–50)
FASTING STATUS PATIENT QL REPORTED: NORMAL
GLOBULIN SER CALC-MCNC: 2.5 G/DL (ref 1.9–3.5)
GLUCOSE SERPL-MCNC: 148 MG/DL (ref 65–99)
HCT VFR BLD AUTO: 45.5 % (ref 37–47)
HCV AB SER QL: NORMAL
HDLC SERPL-MCNC: 38 MG/DL
HGB BLD-MCNC: 14.9 G/DL (ref 12–16)
IMM GRANULOCYTES # BLD AUTO: 0.02 K/UL (ref 0–0.11)
IMM GRANULOCYTES NFR BLD AUTO: 0.3 % (ref 0–0.9)
LDLC SERPL CALC-MCNC: 104 MG/DL
LYMPHOCYTES # BLD AUTO: 2.16 K/UL (ref 1–4.8)
LYMPHOCYTES NFR BLD: 32.6 % (ref 22–41)
MCH RBC QN AUTO: 29.8 PG (ref 27–33)
MCHC RBC AUTO-ENTMCNC: 32.7 G/DL (ref 33.6–35)
MCV RBC AUTO: 91 FL (ref 81.4–97.8)
MONOCYTES # BLD AUTO: 0.57 K/UL (ref 0–0.85)
MONOCYTES NFR BLD AUTO: 8.6 % (ref 0–13.4)
NEUTROPHILS # BLD AUTO: 3.56 K/UL (ref 2–7.15)
NEUTROPHILS NFR BLD: 53.6 % (ref 44–72)
NRBC # BLD AUTO: 0 K/UL
NRBC BLD-RTO: 0 /100 WBC
PLATELET # BLD AUTO: 289 K/UL (ref 164–446)
PMV BLD AUTO: 10.3 FL (ref 9–12.9)
POTASSIUM SERPL-SCNC: 4.5 MMOL/L (ref 3.6–5.5)
PROT SERPL-MCNC: 7.2 G/DL (ref 6–8.2)
RBC # BLD AUTO: 5 M/UL (ref 4.2–5.4)
SODIUM SERPL-SCNC: 141 MMOL/L (ref 135–145)
TRIGL SERPL-MCNC: 217 MG/DL (ref 0–149)
WBC # BLD AUTO: 6.6 K/UL (ref 4.8–10.8)

## 2020-07-16 PROCEDURE — 36415 COLL VENOUS BLD VENIPUNCTURE: CPT

## 2020-07-16 PROCEDURE — 80053 COMPREHEN METABOLIC PANEL: CPT

## 2020-07-16 PROCEDURE — 85025 COMPLETE CBC W/AUTO DIFF WBC: CPT

## 2020-07-16 PROCEDURE — 80061 LIPID PANEL: CPT

## 2020-07-16 PROCEDURE — G0472 HEP C SCREEN HIGH RISK/OTHER: HCPCS | Mod: GA

## 2020-08-03 ENCOUNTER — OFFICE VISIT (OUTPATIENT)
Dept: URGENT CARE | Facility: CLINIC | Age: 75
End: 2020-08-03
Payer: MEDICARE

## 2020-08-03 ENCOUNTER — TELEPHONE (OUTPATIENT)
Dept: MEDICAL GROUP | Facility: PHYSICIAN GROUP | Age: 75
End: 2020-08-03

## 2020-08-03 VITALS
RESPIRATION RATE: 16 BRPM | TEMPERATURE: 97.6 F | BODY MASS INDEX: 27.59 KG/M2 | HEIGHT: 61 IN | OXYGEN SATURATION: 95 % | HEART RATE: 66 BPM | SYSTOLIC BLOOD PRESSURE: 192 MMHG | DIASTOLIC BLOOD PRESSURE: 92 MMHG

## 2020-08-03 DIAGNOSIS — S40.021A ARM BRUISE, RIGHT, INITIAL ENCOUNTER: ICD-10-CM

## 2020-08-03 PROCEDURE — 99213 OFFICE O/P EST LOW 20 MIN: CPT | Performed by: FAMILY MEDICINE

## 2020-08-03 ASSESSMENT — ENCOUNTER SYMPTOMS
SHORTNESS OF BREATH: 0
VOMITING: 0
SORE THROAT: 0
FOCAL WEAKNESS: 0
TINGLING: 0
FEVER: 0
SENSORY CHANGE: 0
COUGH: 0

## 2020-08-03 NOTE — TELEPHONE ENCOUNTER
VOICEMAIL  1. Caller Name: Zenaida Rios                        Call Back Number: [phone]      2. Message: Patient lvm requesting to be seen today because she has a lump on her arm and its turned her arm purple.    3. Patient approves office to leave a detailed voicemail/MyChart message: yes    Called patient back and she stated she would be going to Urgent Care. Informed that was going to be my recommendation as [facility] does not have any more openings today.

## 2020-08-03 NOTE — PROGRESS NOTES
Subjective:     Zenaida Rios is a 74 y.o. female who presents for Other (right shoulder and arm soreness and tender, didn't hurt herself, no pain, bruising and turning purple all over the right arm started 3 days ago)    HPI  Pt presents for evaluation of a new problem  Patient with right arm bruising the past 3 days   Started as right shoulder pain which spontaneously started without fall or injury  Patient is a very active person and thought she strained it  Then had a large amount of bruising in her bicep area and anterior elbow  Has a little pain in the bicep area now   Pain in shoulder is resolved   Retains full range of motion in the shoulder and elbow  No swelling in the forearm or hand  No pain in the forearm or hand  Bruising is improving  Overall, the symptoms do not bother patient, however she did want to be evaluated to make sure she does not need to do anything for them    Review of Systems   Constitutional: Negative for fever.   HENT: Negative for sore throat.    Respiratory: Negative for cough and shortness of breath.    Cardiovascular: Negative for chest pain.   Gastrointestinal: Negative for vomiting.   Skin: Positive for rash.   Neurological: Negative for tingling, sensory change and focal weakness.     PMH:  has a past medical history of Anesthesia, Backpain, Dental disorder, Diabetes, Heart murmur, Hypertension, and Pain. She also has no past medical history of Angina, Arrhythmia, Arthritis, ASTHMA, Bronchitis, Cancer (HCC), CATARACT, Congestive heart failure (HCC), COPD, Dialysis, Glaucoma, Heart valve disease, Indigestion, Infectious disease, Jaundice, Myocardial infarct (HCC), Other specified symptom associated with female genital organs, Pacemaker, Personal history of venous thrombosis and embolism, Pneumonia, Psychiatric problem, Renal disorder, Rheumatic fever, Seizure (HCC), Stroke (HCC), Unspecified disorder of thyroid, Unspecified hemorrhagic conditions, or Unspecified urinary  "incontinence.  MEDS:   Current Outpatient Medications:   •  amLODIPine (NORVASC) 10 MG Tab, TAKE 1 TABLET EVERY DAY, Disp: 90 Tab, Rfl: 0  •  lisinopril (PRINIVIL) 40 MG tablet, TAKE 1 TABLET EVERY DAY, Disp: 90 Tab, Rfl: 0  •  atenolol (TENORMIN) 50 MG Tab, TAKE 1 TABLET TWICE DAILY, Disp: 180 Tab, Rfl: 0  •  gabapentin (NEURONTIN) 300 MG Cap, TAKE 1 CAPSULE AT BEDTIME, Disp: 90 Cap, Rfl: 0  •  metFORMIN (GLUCOPHAGE) 850 MG Tab, TAKE 1 TABLET THREE TIMES DAILY, Disp: 270 Tab, Rfl: 0  •  pravastatin (PRAVACHOL) 10 MG Tab, Take 1 Tab by mouth every day., Disp: 90 Tab, Rfl: 0  •  glimepiride (AMARYL) 4 MG Tab, Take 1 Tab by mouth 2 times a day., Disp: 180 Tab, Rfl: 0  •  vitamin D (CHOLECALCIFEROL) 1000 UNIT Tab, Take 1,000 Units by mouth every day., Disp: , Rfl:   •  aspirin EC (ECOTRIN) 81 MG Tablet Delayed Response, Take 81 mg by mouth every day., Disp: , Rfl:   •  glucose blood strip, 1 Each by Other route as needed., Disp: , Rfl:   •  STOOL SOFTENER PO, Take 1 Tab by mouth every day., Disp: , Rfl:   ALLERGIES:   Allergies   Allergen Reactions   • Percocet [Oxycodone-Acetaminophen] Swelling   • Demerol    • Statins [Hmg-Coa-R Inhibitors]      SURGHX:   Past Surgical History:   Procedure Laterality Date   • LUMBAR FUSION POSTERIOR  1/11/2010    Performed by LYNNETTE SMILEY at SURGERY [facility]   • LAMINOTOMY  1/11/2010    Performed by LYNNETTE SMILEY at SURGERY [facility]   • LUMBAR LAMINECTOMY DISKECTOMY  8/31/2009   • OTHER ORTHOPEDIC SURGERY  1989    tibia has a tanna knee to ankle, fibula is resected   • PRIMARY C SECTION  1988   • OTHER  1975    left lumpectomt in breast - benign   • OTHER       SOCHX:  reports that she has never smoked. She has never used smokeless tobacco. She reports that she does not drink alcohol or use drugs.  FH: Family history was reviewed, not contributing to acute complaint      Objective:   BP (!) 192/92   Pulse 66   Temp 36.4 °C (97.6 °F)   Resp 16   Ht 1.549 m (5' 1\") "   SpO2 95%   BMI 27.59 kg/m²     Physical Exam  Constitutional:       General: She is not in acute distress.     Appearance: She is well-developed. She is not diaphoretic.   HENT:      Head: Normocephalic and atraumatic.   Musculoskeletal:      Comments: Full range of motion and strength in right shoulder and elbow.  Mild swelling and large amount of bruising in bicep area.  Bruising extends past the elbow into the proximal forearm.  +Mild tenderness in the anterior bicep.  No swelling in forearm, wrist, hand.  2+ radial pulse   Skin:     General: Skin is warm and dry.   Neurological:      Mental Status: She is alert and oriented to person, place, and time.      Sensory: No sensory deficit.   Psychiatric:         Behavior: Behavior normal.         Thought Content: Thought content normal.         Judgment: Judgment normal.       Assessment/Plan:   Assessment    1. Arm bruise, right, initial encounter    Patient with a large bruise and mild swelling in the bicep area.  She has no distal swelling in the hand/wrist/forearm.  Her exam is most consistent with ruptured proximal bicep tendon based on her acute shoulder pain preceding elbow bruising.  Advised that an MRI would be able to further evaluate.  Because symptoms are improving and not very bothersome to her, she declines further work-up and would like to perform watchful waiting.  Patient given close follow-up precautions if she should develop any worsening pain, worsening swelling, new bruising, or swelling in the distal aspect of the extremity.  Patient is agreeable to keeping a close eye on her symptoms and will follow-up if having any concerns.  She will also make a follow-up appointment with her PCP in the next 1 to 2 weeks to recheck and ensure that this is resolving.

## 2020-08-11 ENCOUNTER — OFFICE VISIT (OUTPATIENT)
Dept: MEDICAL GROUP | Facility: PHYSICIAN GROUP | Age: 75
End: 2020-08-11
Payer: MEDICARE

## 2020-08-11 VITALS
DIASTOLIC BLOOD PRESSURE: 90 MMHG | TEMPERATURE: 97.5 F | BODY MASS INDEX: 27.59 KG/M2 | HEART RATE: 72 BPM | OXYGEN SATURATION: 98 % | HEIGHT: 61 IN | SYSTOLIC BLOOD PRESSURE: 144 MMHG

## 2020-08-11 DIAGNOSIS — S46.211D RUPTURE OF RIGHT PROXIMAL BICEPS TENDON, SUBSEQUENT ENCOUNTER: ICD-10-CM

## 2020-08-11 PROCEDURE — 99213 OFFICE O/P EST LOW 20 MIN: CPT | Performed by: PHYSICIAN ASSISTANT

## 2020-08-11 ASSESSMENT — PAIN SCALES - GENERAL: PAINLEVEL: NO PAIN

## 2020-08-11 NOTE — PROGRESS NOTES
"CC:  Chief Complaint   Patient presents with   • Follow-Up     [facility], right arm \"turned deep purple\" (pt refusesd weight)       HISTORY OF PRESENT ILLNESS: Patient is a 74 y.o. female established patient presenting for follow-up from urgent care where she was seen last week for right arm bruising and swelling over her biceps.  She states that her whole right arm was bruised after she felt pain in her right shoulder and then subsequently had pain and swelling in her right biceps.  Her bruising has improved.      No problem-specific Assessment & Plan notes found for this encounter.      Patient Active Problem List    Diagnosis Date Noted   • Medication refill 03/20/2020   • Controlled type 2 diabetes mellitus without complication, without long-term current use of insulin (HCC) 11/19/2019   • Dyslipidemia 11/19/2019   • Acute left-sided low back pain with left-sided sciatica 09/03/2019   • Leg weakness, bilateral 09/03/2019   • Decreased GFR 07/18/2019   • Neuropathy 01/17/2019   • Vitamin D deficiency 01/17/2019   • Obesity (BMI 30-39.9) 07/17/2018   • Benign essential HTN 05/21/2018      Allergies:Percocet [oxycodone-acetaminophen], Demerol, and Statins [hmg-coa-r inhibitors]    Current Outpatient Medications   Medication Sig Dispense Refill   • amLODIPine (NORVASC) 10 MG Tab TAKE 1 TABLET EVERY DAY 90 Tab 0   • lisinopril (PRINIVIL) 40 MG tablet TAKE 1 TABLET EVERY DAY 90 Tab 0   • atenolol (TENORMIN) 50 MG Tab TAKE 1 TABLET TWICE DAILY 180 Tab 0   • gabapentin (NEURONTIN) 300 MG Cap TAKE 1 CAPSULE AT BEDTIME 90 Cap 0   • metFORMIN (GLUCOPHAGE) 850 MG Tab TAKE 1 TABLET THREE TIMES DAILY 270 Tab 0   • pravastatin (PRAVACHOL) 10 MG Tab Take 1 Tab by mouth every day. 90 Tab 0   • glimepiride (AMARYL) 4 MG Tab Take 1 Tab by mouth 2 times a day. 180 Tab 0   • vitamin D (CHOLECALCIFEROL) 1000 UNIT Tab Take 1,000 Units by mouth every day.     • aspirin EC (ECOTRIN) 81 MG Tablet Delayed Response Take 81 mg by mouth every " "day.     • glucose blood strip 1 Each by Other route as needed.     • STOOL SOFTENER PO Take 1 Tab by mouth every day.       No current facility-administered medications for this visit.        Social History     Tobacco Use   • Smoking status: Never Smoker   • Smokeless tobacco: Never Used   Substance Use Topics   • Alcohol use: No   • Drug use: No     Social History     Social History Narrative    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[employer]        Family History   Adopted: Yes        ROS:     - Constitutional:  Negative for fever, chills, unexpected weight change, and fatigue/generalized weakness.     - Respiratory: Negative for cough, sputum production, chest congestion, dyspnea, wheezing, and crackles.      - Cardiovascular: Negative for chest pain, palpitations, orthopnea, and bilateral lower extremity edema.     - Musculoskeletal: Positive for right shoulder and right biceps pain.  Negative for back pain    - Skin: Positive for ecchymosis.  Negative for rash, itching, cyanotic skin color change.     - Neurological: Negative for dizziness, tingling, tremors, focal sensory deficit, focal weakness and headaches.     - Endo/Heme/Allergies: Does not bruise/bleed easily.             - NOTE: All other systems reviewed and are negative, except as in HPI.      Exam:    /90 (BP Location: Right arm, Patient Position: Sitting)   Pulse 72   Temp 36.4 °C (97.5 °F) (Temporal)   Ht 1.549 m (5' 1\")   SpO2 98%  Body mass index is 27.59 kg/m².    General:  Well nourished, well developed female in NAD  Head is grossly normal.  Neck: Supple. Thyroid is not enlarged.  Pulmonary: No accessory muscle use  Musculoskeletal: Positive for mild ecchymosis throughout her entire right arm, positive for Keyur sign over right biceps.  Skin: Warm and dry. No obvious lesions  Neuro: Normal muscle tone. Gait normal. Alert and oriented.  Psych: Normal mood and affect      Please note that this dictation was created using voice recognition " software. I have made every reasonable attempt to correct obvious errors, but I expect that there are errors of grammar and possibly content that I did not discover before finalizing the note.      Assessment/Plan:  1. Rupture of right proximal biceps tendon, subsequent encounter  I explained to her what happened with pictures in the room.  I explained to her that I would be willing to refer her to an orthopedist for evaluation of whether she is a candidate for surgery or not.  She declined this and would rather watch and wait.

## 2020-09-25 DIAGNOSIS — I10 BENIGN ESSENTIAL HTN: ICD-10-CM

## 2020-09-25 DIAGNOSIS — E11.9 CONTROLLED TYPE 2 DIABETES MELLITUS WITHOUT COMPLICATION, WITHOUT LONG-TERM CURRENT USE OF INSULIN (HCC): ICD-10-CM

## 2020-09-25 DIAGNOSIS — E78.2 MIXED HYPERLIPIDEMIA: ICD-10-CM

## 2020-09-25 NOTE — TELEPHONE ENCOUNTER
Received refill request for the following medications:   Amlodipine 10mg   Atenolol 50mg   Lisinopril 40mg     Last PCP visit: 08/11/2020

## 2020-09-28 RX ORDER — ATENOLOL 50 MG/1
50 TABLET ORAL DAILY
Qty: 90 TAB | Refills: 1 | Status: SHIPPED | OUTPATIENT
Start: 2020-09-28 | End: 2021-02-26 | Stop reason: SDUPTHER

## 2020-09-28 RX ORDER — AMLODIPINE BESYLATE 10 MG/1
10 TABLET ORAL
Qty: 90 TAB | Refills: 0 | Status: SHIPPED | OUTPATIENT
Start: 2020-09-28 | End: 2020-12-10

## 2020-09-28 RX ORDER — LISINOPRIL 40 MG/1
40 TABLET ORAL
Qty: 90 TAB | Refills: 1 | Status: SHIPPED | OUTPATIENT
Start: 2020-09-28 | End: 2021-02-26 | Stop reason: SDUPTHER

## 2020-10-07 ENCOUNTER — NON-PROVIDER VISIT (OUTPATIENT)
Dept: MEDICAL GROUP | Facility: PHYSICIAN GROUP | Age: 75
End: 2020-10-07
Payer: MEDICARE

## 2020-10-07 DIAGNOSIS — Z23 NEED FOR VACCINATION: ICD-10-CM

## 2020-10-07 PROCEDURE — G0008 ADMIN INFLUENZA VIRUS VAC: HCPCS | Performed by: PHYSICIAN ASSISTANT

## 2020-10-07 PROCEDURE — 90686 IIV4 VACC NO PRSV 0.5 ML IM: CPT | Performed by: PHYSICIAN ASSISTANT

## 2020-10-07 NOTE — PROGRESS NOTES
"Zenaida Rios is a 74 y.o. female here for a non-provider visit for:   FLU    Reason for immunization: Annual Flu Vaccine  Immunization records indicate need for vaccine: Yes, confirmed with Epic  Minimum interval has been met for this vaccine: Yes  ABN completed: Not Indicated    Order and dose verified by: ZANDER  VIS Dated  08/15/2019 was given to patient: Yes  All IAC Questionnaire questions were answered \"No.\"    Patient tolerated injection and no adverse effects were observed or reported: Yes    Pt scheduled for next dose in series: Not Indicated      Patient states that she always has an adverse reaction when given HD flu vaccine. Provider approved regular flu shot.   "

## 2020-11-25 ENCOUNTER — TELEPHONE (OUTPATIENT)
Dept: MEDICAL GROUP | Facility: PHYSICIAN GROUP | Age: 75
End: 2020-11-25

## 2020-11-25 NOTE — TELEPHONE ENCOUNTER
VOICEMAIL  1. Caller Name: Zenaida Rios                          Call Back Number: [phone]    2. Message: Called asking to speak to maximilian's medical assistant; said she had some questions for her    3. Patient approves office to leave a detailed voicemail/MyChart message: yes

## 2020-12-01 ENCOUNTER — HOSPITAL ENCOUNTER (OUTPATIENT)
Dept: LAB | Facility: MEDICAL CENTER | Age: 75
End: 2020-12-01
Attending: PHYSICIAN ASSISTANT
Payer: MEDICARE

## 2020-12-01 ENCOUNTER — OFFICE VISIT (OUTPATIENT)
Dept: MEDICAL GROUP | Facility: PHYSICIAN GROUP | Age: 75
End: 2020-12-01
Payer: MEDICARE

## 2020-12-01 VITALS
HEART RATE: 73 BPM | SYSTOLIC BLOOD PRESSURE: 140 MMHG | TEMPERATURE: 97.4 F | OXYGEN SATURATION: 99 % | RESPIRATION RATE: 12 BRPM | BODY MASS INDEX: 27.59 KG/M2 | HEIGHT: 61 IN | DIASTOLIC BLOOD PRESSURE: 82 MMHG

## 2020-12-01 DIAGNOSIS — Z11.1 SCREENING-PULMONARY TB: ICD-10-CM

## 2020-12-01 DIAGNOSIS — Z12.31 ENCOUNTER FOR SCREENING MAMMOGRAM FOR BREAST CANCER: ICD-10-CM

## 2020-12-01 DIAGNOSIS — E11.9 CONTROLLED TYPE 2 DIABETES MELLITUS WITHOUT COMPLICATION, WITHOUT LONG-TERM CURRENT USE OF INSULIN (HCC): ICD-10-CM

## 2020-12-01 LAB
HBA1C MFR BLD: 8.3 % (ref 0–5.6)
INT CON NEG: NEGATIVE
INT CON POS: POSITIVE

## 2020-12-01 PROCEDURE — 83036 HEMOGLOBIN GLYCOSYLATED A1C: CPT | Performed by: PHYSICIAN ASSISTANT

## 2020-12-01 PROCEDURE — 99213 OFFICE O/P EST LOW 20 MIN: CPT | Performed by: PHYSICIAN ASSISTANT

## 2020-12-01 PROCEDURE — 86480 TB TEST CELL IMMUN MEASURE: CPT | Mod: GY

## 2020-12-01 PROCEDURE — 36415 COLL VENOUS BLD VENIPUNCTURE: CPT

## 2020-12-01 NOTE — LETTER
December 1, 2020         Patient: Zenaida Rios   YOB: 1945   Date of Visit: 12/1/2020           To Whom it May Concern:    Zenaida Rios was seen in my clinic on 12/1/2020. She is in good health and is cleared to work.     If you have any questions or concerns, please don't hesitate to call.        Sincerely,           Warren Osborne P.A.-C.  Electronically Signed

## 2020-12-01 NOTE — PROGRESS NOTES
CC:  Chief Complaint   Patient presents with   • Paperwork     statement for work        HISTORY OF PRESENT ILLNESS: Patient is a 75 y.o. 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     Patient's in office hemoglobin A1c today is 8.3%.  She is currently on Metformin 850 mg 3 times daily and glimepiride 4 mg twice daily.    She would like to have a QuantiFERON gold test done.    Patient Active Problem List    Diagnosis Date Noted   • Medication refill 03/20/2020   • Controlled type 2 diabetes mellitus without complication, without long-term current use of insulin (HCC) 11/19/2019   • Dyslipidemia 11/19/2019   • Acute left-sided low back pain with left-sided sciatica 09/03/2019   • Leg weakness, bilateral 09/03/2019   • Decreased GFR 07/18/2019   • Neuropathy 01/17/2019   • Vitamin D deficiency 01/17/2019   • Obesity (BMI 30-39.9) 07/17/2018   • Benign essential HTN 05/21/2018      Allergies:Percocet [oxycodone-acetaminophen], Demerol, and Statins [hmg-coa-r inhibitors]    Current Outpatient Medications   Medication Sig Dispense Refill   • amLODIPine (NORVASC) 10 MG Tab Take 1 Tab by mouth every day for 180 days. 90 Tab 0   • atenolol (TENORMIN) 50 MG Tab Take 1 Tab by mouth every day for 180 days. 90 Tab 1   • lisinopril (PRINIVIL) 40 MG tablet Take 1 Tab by mouth every day for 180 days. 90 Tab 1   • gabapentin (NEURONTIN) 300 MG Cap TAKE 1 CAPSULE AT BEDTIME 90 Cap 0   • metFORMIN (GLUCOPHAGE) 850 MG Tab TAKE 1 TABLET THREE TIMES DAILY 270 Tab 0   • pravastatin (PRAVACHOL) 10 MG Tab Take 1 Tab by mouth every day. 90 Tab 0   • glimepiride (AMARYL) 4 MG Tab Take 1 Tab by mouth 2 times a day. 180 Tab 0   • vitamin D (CHOLECALCIFEROL) 1000 UNIT Tab Take 1,000 Units by mouth every day.     • aspirin EC (ECOTRIN) 81 MG Tablet Delayed Response Take 81 mg by mouth every day.     • STOOL SOFTENER PO Take 1 Tab by mouth every day.     • glucose blood strip 1 Each by Other  "route as needed.       No current facility-administered medications for this visit.        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    - HEENT:  Negative for headaches, vision changes, hearing changes, ear pain, ear discharge, rhinorrhea, sinus congestion, sore throat, and neck pain.      - Respiratory: Negative for cough, sputum production, chest congestion, dyspnea, wheezing, and crackles.      - Cardiovascular: Negative for chest pain, palpitations, orthopnea, and bilateral lower extremity edema.     - Gastrointestinal: Negative for heartburn, nausea, vomiting, abdominal pain, hematochezia, melena, diarrhea, constipation, and greasy/foul-smelling stools.     - Genitourinary: Negative for dysuria, polyuria, hematuria, pyuria, urinary urgency, and urinary incontinence.     - Musculoskeletal: Negative for myalgias, back pain, and joint pain.     - Skin: Negative for rash, itching, cyanotic skin color change.     - Neurological: Negative for dizziness, tingling, tremors, focal sensory deficit, focal weakness and headaches.     - Endo/Heme/Allergies: Does not bruise/bleed easily.     - Psychiatric/Behavioral: Negative for depression, suicidal/homicidal ideation and memory loss.          - NOTE: All other systems reviewed and are negative, except as in HPI.      Exam:    /82 (BP Location: Left arm, Patient Position: Sitting, BP Cuff Size: Adult)   Pulse 73   Temp 36.3 °C (97.4 °F) (Temporal)   Resp 12   Ht 1.549 m (5' 1\")   SpO2 99%  Body mass index is 27.59 kg/m².    General:  Well nourished, well developed female in NAD  Head is grossly normal.  Neck: " Supple.   Pulmonary: No accessory muscle use  Skin: Warm and dry. No obvious lesions  Neuro: Normal muscle tone. Gait normal. Alert and oriented.  Psych: Normal mood and affect      Please note that this dictation was created using voice recognition software. I have made every reasonable attempt to correct obvious errors, but I expect that there are errors of grammar and possibly content that I did not discover before finalizing the note.    LABS: 12/1/2020: Results reviewed and discussed with the patient, questions answered.    Assessment/Plan:  1. Controlled type 2 diabetes mellitus without complication, without long-term current use of insulin (HCC)  Her hemoglobin A1c has crept up a little bit since the last time it was checked about 5 months ago.  She will work on lifestyle measures to try to bring this down and we will check this again in 3 months  - POCT Hemoglobin A1C    2. Screening-pulmonary TB  Order placed  - Quantiferon Gold TB (PPD); Future    3. Encounter for screening mammogram for breast cancer  Order placed  - MA-SCREENING MAMMO BILAT W/TOMOSYNTHESIS W/CAD; Future

## 2020-12-04 LAB
GAMMA INTERFERON BACKGROUND BLD IA-ACNC: 0.08 IU/ML
M TB IFN-G BLD-IMP: NEGATIVE
M TB IFN-G CD4+ BCKGRND COR BLD-ACNC: 0.05 IU/ML
MITOGEN IGNF BCKGRD COR BLD-ACNC: >10 IU/ML
QFT TB2 - NIL TBQ2: 0.03 IU/ML

## 2020-12-10 DIAGNOSIS — E11.9 CONTROLLED TYPE 2 DIABETES MELLITUS WITHOUT COMPLICATION, WITHOUT LONG-TERM CURRENT USE OF INSULIN (HCC): ICD-10-CM

## 2020-12-10 DIAGNOSIS — I10 BENIGN ESSENTIAL HTN: ICD-10-CM

## 2020-12-10 DIAGNOSIS — E78.2 MIXED HYPERLIPIDEMIA: ICD-10-CM

## 2020-12-10 RX ORDER — AMLODIPINE BESYLATE 10 MG/1
TABLET ORAL
Qty: 90 TAB | Refills: 0 | Status: SHIPPED | OUTPATIENT
Start: 2020-12-10 | End: 2021-02-26 | Stop reason: SDUPTHER

## 2021-02-26 ENCOUNTER — OFFICE VISIT (OUTPATIENT)
Dept: MEDICAL GROUP | Facility: PHYSICIAN GROUP | Age: 76
End: 2021-02-26
Payer: MEDICARE

## 2021-02-26 VITALS
BODY MASS INDEX: 27.59 KG/M2 | RESPIRATION RATE: 16 BRPM | SYSTOLIC BLOOD PRESSURE: 130 MMHG | TEMPERATURE: 97.4 F | DIASTOLIC BLOOD PRESSURE: 82 MMHG | HEART RATE: 60 BPM | OXYGEN SATURATION: 99 % | HEIGHT: 61 IN

## 2021-02-26 DIAGNOSIS — I10 BENIGN ESSENTIAL HTN: ICD-10-CM

## 2021-02-26 DIAGNOSIS — M54.40 CHRONIC MIDLINE LOW BACK PAIN WITH SCIATICA, SCIATICA LATERALITY UNSPECIFIED: ICD-10-CM

## 2021-02-26 DIAGNOSIS — E78.5 DYSLIPIDEMIA: ICD-10-CM

## 2021-02-26 DIAGNOSIS — Z12.11 SCREENING FOR COLON CANCER: ICD-10-CM

## 2021-02-26 DIAGNOSIS — E11.9 TYPE 2 DIABETES MELLITUS WITHOUT COMPLICATION, WITH LONG-TERM CURRENT USE OF INSULIN (HCC): ICD-10-CM

## 2021-02-26 DIAGNOSIS — Z79.4 TYPE 2 DIABETES MELLITUS WITHOUT COMPLICATION, WITH LONG-TERM CURRENT USE OF INSULIN (HCC): ICD-10-CM

## 2021-02-26 DIAGNOSIS — E78.2 MIXED HYPERLIPIDEMIA: ICD-10-CM

## 2021-02-26 DIAGNOSIS — G89.29 CHRONIC MIDLINE LOW BACK PAIN WITH SCIATICA, SCIATICA LATERALITY UNSPECIFIED: ICD-10-CM

## 2021-02-26 PROCEDURE — 99214 OFFICE O/P EST MOD 30 MIN: CPT | Performed by: PHYSICIAN ASSISTANT

## 2021-02-26 RX ORDER — GLIMEPIRIDE 4 MG/1
4 TABLET ORAL 2 TIMES DAILY
Qty: 180 TABLET | Refills: 1 | Status: SHIPPED | OUTPATIENT
Start: 2021-02-26 | End: 2021-06-01 | Stop reason: SDUPTHER

## 2021-02-26 RX ORDER — PIOGLITAZONEHYDROCHLORIDE 15 MG/1
15 TABLET ORAL DAILY
Qty: 90 TABLET | Refills: 0 | Status: SHIPPED | OUTPATIENT
Start: 2021-02-26 | End: 2021-05-27

## 2021-02-26 RX ORDER — AMLODIPINE BESYLATE 10 MG/1
10 TABLET ORAL
Qty: 90 TABLET | Refills: 1 | Status: SHIPPED | OUTPATIENT
Start: 2021-02-26 | End: 2021-06-01 | Stop reason: SDUPTHER

## 2021-02-26 RX ORDER — ATENOLOL 50 MG/1
50 TABLET ORAL DAILY
Qty: 90 TABLET | Refills: 1 | Status: SHIPPED | OUTPATIENT
Start: 2021-02-26 | End: 2021-06-01 | Stop reason: SDUPTHER

## 2021-02-26 RX ORDER — LISINOPRIL 40 MG/1
40 TABLET ORAL
Qty: 90 TABLET | Refills: 1 | Status: SHIPPED | OUTPATIENT
Start: 2021-02-26 | End: 2021-06-01 | Stop reason: SDUPTHER

## 2021-02-26 RX ORDER — GABAPENTIN 300 MG/1
CAPSULE ORAL
Qty: 90 CAPSULE | Refills: 3 | Status: SHIPPED | OUTPATIENT
Start: 2021-02-26 | End: 2021-06-01 | Stop reason: SDUPTHER

## 2021-02-26 RX ORDER — PRAVASTATIN SODIUM 10 MG
10 TABLET ORAL
Qty: 90 TABLET | Refills: 3 | Status: SHIPPED | OUTPATIENT
Start: 2021-02-26 | End: 2021-06-01 | Stop reason: SDUPTHER

## 2021-02-26 ASSESSMENT — PATIENT HEALTH QUESTIONNAIRE - PHQ9: CLINICAL INTERPRETATION OF PHQ2 SCORE: 0

## 2021-02-26 NOTE — PROGRESS NOTES
CC:  Chief Complaint   Patient presents with   • Follow-Up   • Medication Refill       HISTORY OF PRESENT ILLNESS: Patient is a 75 y.o. female established patient presenting for recheck  1. Pt's A1C today is 8.5% which is increased from 8.3% from 3 months ago.  At her last visit she opted to try lifestyle intervention instead of starting on a new medication for her diabetes.  She notes today that she would like to have all of her medications sent through [facility] mail order pharmacy.  2. Pt's HTN well controlled with lisinopril, norvasc and atenolol.   3. Pt's last lipid profile about 7 months ago. Results for MANDO IBRAHIM (MRN [id number]) as of 2/26/2021 09:07   Ref. Range 7/16/2020 07:22   Cholesterol,Tot Latest Ref Range: 100 - 199 mg/dL 185   Triglycerides Latest Ref Range: 0 - 149 mg/dL 217 (H)   HDL Latest Ref Range: >=40 mg/dL 38 (A)   LDL Latest Ref Range: <100 mg/dL 104 (H)     4. Pt will schedule appt with her eye doctor soon  5.  Patient is due for a repeat colonoscopy this year.    No problem-specific Assessment & Plan notes found for this encounter.      Patient Active Problem List    Diagnosis Date Noted   • Medication refill 03/20/2020   • Controlled type 2 diabetes mellitus without complication, without long-term current use of insulin (HCC) 11/19/2019   • Dyslipidemia 11/19/2019   • Acute left-sided low back pain with left-sided sciatica 09/03/2019   • Leg weakness, bilateral 09/03/2019   • Decreased GFR 07/18/2019   • Neuropathy 01/17/2019   • Vitamin D deficiency 01/17/2019   • Obesity (BMI 30-39.9) 07/17/2018   • Benign essential HTN 05/21/2018      Allergies:Percocet [oxycodone-acetaminophen], Demerol, and Statins [hmg-coa-r inhibitors]    Current Outpatient Medications   Medication Sig Dispense Refill   • amLODIPine (NORVASC) 10 MG Tab TAKE 1 TABLET EVERY DAY 90 Tab 0   • atenolol (TENORMIN) 50 MG Tab Take 1 Tab by mouth every day for 180 days. 90 Tab 1   • lisinopril (PRINIVIL) 40  MG tablet Take 1 Tab by mouth every day for 180 days. 90 Tab 1   • gabapentin (NEURONTIN) 300 MG Cap TAKE 1 CAPSULE AT BEDTIME 90 Cap 0   • metFORMIN (GLUCOPHAGE) 850 MG Tab TAKE 1 TABLET THREE TIMES DAILY 270 Tab 0   • pravastatin (PRAVACHOL) 10 MG Tab Take 1 Tab by mouth every day. 90 Tab 0   • glimepiride (AMARYL) 4 MG Tab Take 1 Tab by mouth 2 times a day. 180 Tab 0   • vitamin D (CHOLECALCIFEROL) 1000 UNIT Tab Take 1,000 Units by mouth every day.     • aspirin EC (ECOTRIN) 81 MG Tablet Delayed Response Take 81 mg by mouth every day.     • glucose blood strip 1 Each by Other route as needed.     • STOOL SOFTENER PO Take 1 Tab by mouth every day.       No current facility-administered medications for this visit.       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    - HEENT:  Negative for headaches, vision changes, hearing changes, ear pain, ear discharge, rhinorrhea, sinus congestion, sore throat, and neck pain.      - Respiratory: Negative for cough, sputum production, chest congestion, dyspnea, wheezing, and crackles.      - Cardiovascular: Negative for chest pain, palpitations, orthopnea, and bilateral lower extremity edema.     - Gastrointestinal: Negative for heartburn, nausea, vomiting, abdominal pain, hematochezia, melena, diarrhea, constipation, and greasy/foul-smelling stools.     - Genitourinary: Negative for dysuria, polyuria, hematuria, pyuria, urinary urgency, and urinary incontinence.     - Musculoskeletal: Positive for lower back pain.  Negative for myalgias, back pain, and joint pain.     - Skin: Negative for rash, itching, cyanotic skin color  "change.     - Neurological: Negative for dizziness, tingling, tremors, focal sensory deficit, focal weakness and headaches.     - Endo/Heme/Allergies: Does not bruise/bleed easily.     - Psychiatric/Behavioral: Negative for depression, suicidal/homicidal ideation and memory loss.              Exam:    /82 (BP Location: Left arm, Patient Position: Sitting, BP Cuff Size: Adult)   Pulse 60   Temp 36.3 °C (97.4 °F) (Temporal)   Resp 16   Ht 1.549 m (5' 1\")   SpO2 99%  Body mass index is 27.59 kg/m².    General:  Well nourished, well developed female in NAD  Head is grossly normal.  Neck: Supple. Thyroid is not enlarged.  Pulmonary: Clear to auscultation. No ronchi, wheezing or rales  Cardiac: Regular rate and rhythm. No murmurs.  Skin: Warm and dry. No obvious lesions  Neuro: Normal muscle tone. Gait normal. Alert and oriented.  Monofilament exam normal  Psych: Normal mood and affect      Please note that this dictation was created using voice recognition software. I have made every reasonable attempt to correct obvious errors, but I expect that there are errors of grammar and possibly content that I did not discover before finalizing the note.    LABS: 2/26/2021: Results reviewed and discussed with the patient, questions answered.    Assessment/Plan:  1. Screening for colon cancer  Referral sent  - REFERRAL TO GI FOR COLONOSCOPY    2. Dyslipidemia  Well-controlled on pravastatin so continue with current dose    3. Benign essential HTN  Blood pressure is well controlled  - atenolol (TENORMIN) 50 MG Tab; Take 1 tablet by mouth every day for 180 days.  Dispense: 90 tablet; Refill: 1  - amLODIPine (NORVASC) 10 MG Tab; Take 1 tablet by mouth every day for 180 days.  Dispense: 90 tablet; Refill: 1  - lisinopril (PRINIVIL) 40 MG tablet; Take 1 tablet by mouth every day for 180 days.  Dispense: 90 tablet; Refill: 1  - pravastatin (PRAVACHOL) 10 MG Tab; Take 1 tablet by mouth every day.  Dispense: 90 tablet; Refill: " 3    4. Type 2 diabetes mellitus without complication, with long-term current use of insulin (HCC)  Her diabetes is poorly controlled so I am going to start her on Actos 15 mg and we will see her again for this in 3 months.  - glimepiride (AMARYL) 4 MG Tab; Take 1 tablet by mouth 2 times a day for 180 days.  Dispense: 180 tablet; Refill: 1  - metFORMIN (GLUCOPHAGE) 850 MG Tab; TAKE 1 TABLET THREE TIMES DAILY  Dispense: 270 tablet; Refill: 2  - pioglitazone (ACTOS) 15 MG Tab; Take 1 tablet by mouth every day for 90 days.  Dispense: 90 tablet; Refill: 0  - Diabetic Monofilament Lower Extremity Exam  - MICROALBUMIN CREAT RATIO URINE; Future    5. Mixed hyperlipidemia  See #2  - amLODIPine (NORVASC) 10 MG Tab; Take 1 tablet by mouth every day for 180 days.  Dispense: 90 tablet; Refill: 1  - lisinopril (PRINIVIL) 40 MG tablet; Take 1 tablet by mouth every day for 180 days.  Dispense: 90 tablet; Refill: 1  - pravastatin (PRAVACHOL) 10 MG Tab; Take 1 tablet by mouth every day.  Dispense: 90 tablet; Refill: 3    6. Chronic midline low back pain with sciatica, sciatica laterality unspecified  Well-controlled with gabapentin.  Refill sent in  - gabapentin (NEURONTIN) 300 MG Cap; TAKE 1 CAPSULE AT BEDTIME  Dispense: 90 capsule; Refill: 3

## 2021-05-05 ENCOUNTER — APPOINTMENT (OUTPATIENT)
Dept: MEDICAL GROUP | Facility: PHYSICIAN GROUP | Age: 76
End: 2021-05-05
Payer: MEDICARE

## 2021-06-01 ENCOUNTER — OFFICE VISIT (OUTPATIENT)
Dept: MEDICAL GROUP | Facility: PHYSICIAN GROUP | Age: 76
End: 2021-06-01
Payer: MEDICARE

## 2021-06-01 VITALS
OXYGEN SATURATION: 97 % | DIASTOLIC BLOOD PRESSURE: 78 MMHG | HEART RATE: 68 BPM | TEMPERATURE: 97.4 F | SYSTOLIC BLOOD PRESSURE: 124 MMHG | HEIGHT: 61 IN | RESPIRATION RATE: 16 BRPM | BODY MASS INDEX: 27.59 KG/M2

## 2021-06-01 DIAGNOSIS — Z12.11 SCREENING FOR COLORECTAL CANCER: ICD-10-CM

## 2021-06-01 DIAGNOSIS — G62.9 NEUROPATHY: ICD-10-CM

## 2021-06-01 DIAGNOSIS — Z79.4 TYPE 2 DIABETES MELLITUS WITHOUT COMPLICATION, WITH LONG-TERM CURRENT USE OF INSULIN (HCC): ICD-10-CM

## 2021-06-01 DIAGNOSIS — E78.5 DYSLIPIDEMIA: ICD-10-CM

## 2021-06-01 DIAGNOSIS — M54.40 CHRONIC MIDLINE LOW BACK PAIN WITH SCIATICA, SCIATICA LATERALITY UNSPECIFIED: ICD-10-CM

## 2021-06-01 DIAGNOSIS — G89.29 CHRONIC MIDLINE LOW BACK PAIN WITH SCIATICA, SCIATICA LATERALITY UNSPECIFIED: ICD-10-CM

## 2021-06-01 DIAGNOSIS — E78.2 MIXED HYPERLIPIDEMIA: ICD-10-CM

## 2021-06-01 DIAGNOSIS — Z00.00 HEALTHCARE MAINTENANCE: ICD-10-CM

## 2021-06-01 DIAGNOSIS — Z12.12 SCREENING FOR COLORECTAL CANCER: ICD-10-CM

## 2021-06-01 DIAGNOSIS — E11.9 TYPE 2 DIABETES MELLITUS WITHOUT COMPLICATION, WITH LONG-TERM CURRENT USE OF INSULIN (HCC): ICD-10-CM

## 2021-06-01 DIAGNOSIS — Z76.0 MEDICATION REFILL: ICD-10-CM

## 2021-06-01 DIAGNOSIS — I10 BENIGN ESSENTIAL HTN: ICD-10-CM

## 2021-06-01 DIAGNOSIS — E55.9 VITAMIN D DEFICIENCY: ICD-10-CM

## 2021-06-01 DIAGNOSIS — R94.4 DECREASED GFR: ICD-10-CM

## 2021-06-01 PROBLEM — M54.42 ACUTE LEFT-SIDED LOW BACK PAIN WITH LEFT-SIDED SCIATICA: Status: RESOLVED | Noted: 2019-09-03 | Resolved: 2021-06-01

## 2021-06-01 PROBLEM — R29.898 LEG WEAKNESS, BILATERAL: Status: RESOLVED | Noted: 2019-09-03 | Resolved: 2021-06-01

## 2021-06-01 LAB
HBA1C MFR BLD: 8.7 % (ref 0–5.6)
INT CON NEG: NEGATIVE
INT CON POS: POSITIVE

## 2021-06-01 PROCEDURE — 99214 OFFICE O/P EST MOD 30 MIN: CPT | Performed by: NURSE PRACTITIONER

## 2021-06-01 PROCEDURE — 83036 HEMOGLOBIN GLYCOSYLATED A1C: CPT | Performed by: NURSE PRACTITIONER

## 2021-06-01 RX ORDER — GLIMEPIRIDE 4 MG/1
4 TABLET ORAL 2 TIMES DAILY
Qty: 180 TABLET | Refills: 1 | Status: SHIPPED | OUTPATIENT
Start: 2021-06-01 | End: 2021-10-13 | Stop reason: SDUPTHER

## 2021-06-01 RX ORDER — LISINOPRIL 40 MG/1
40 TABLET ORAL
Qty: 90 TABLET | Refills: 1 | Status: SHIPPED | OUTPATIENT
Start: 2021-06-01 | End: 2021-10-13 | Stop reason: SDUPTHER

## 2021-06-01 RX ORDER — EMPAGLIFLOZIN 10 MG/1
1 TABLET, FILM COATED ORAL DAILY
Qty: 90 TABLET | Refills: 2 | Status: SHIPPED | OUTPATIENT
Start: 2021-06-01 | End: 2021-07-20 | Stop reason: SDUPTHER

## 2021-06-01 RX ORDER — AMLODIPINE BESYLATE 10 MG/1
10 TABLET ORAL
Qty: 90 TABLET | Refills: 1 | Status: SHIPPED | OUTPATIENT
Start: 2021-06-01 | End: 2021-10-13 | Stop reason: SDUPTHER

## 2021-06-01 RX ORDER — GABAPENTIN 300 MG/1
CAPSULE ORAL
Qty: 90 CAPSULE | Refills: 1 | Status: SHIPPED | OUTPATIENT
Start: 2021-06-01 | End: 2021-10-13 | Stop reason: SDUPTHER

## 2021-06-01 RX ORDER — PRAVASTATIN SODIUM 10 MG
10 TABLET ORAL
Qty: 90 TABLET | Refills: 1 | Status: SHIPPED | OUTPATIENT
Start: 2021-06-01 | End: 2021-10-13 | Stop reason: SDUPTHER

## 2021-06-01 RX ORDER — ATENOLOL 50 MG/1
50 TABLET ORAL DAILY
Qty: 90 TABLET | Refills: 1 | Status: SHIPPED | OUTPATIENT
Start: 2021-06-01 | End: 2021-10-13 | Stop reason: SDUPTHER

## 2021-06-01 ASSESSMENT — ENCOUNTER SYMPTOMS
CHILLS: 0
FEVER: 0
EYES NEGATIVE: 1
MYALGIAS: 0
WEIGHT LOSS: 0
DIZZINESS: 0
ABDOMINAL PAIN: 0
COUGH: 0
BACK PAIN: 1
DEPRESSION: 0
SHORTNESS OF BREATH: 0
HEADACHES: 0
PALPITATIONS: 0
BLOOD IN STOOL: 0
WEAKNESS: 0
INSOMNIA: 0

## 2021-06-01 NOTE — ASSESSMENT & PLAN NOTE
From results as of July 2020 no noted changes in her GFR.  We will trend her GFR at the next labs.    Results for DEYSINAVIMANDO (MRN [id number]) as of 6/1/2021 08:50   Ref. Range 7/16/2020 07:22   GFR If Non  Latest Ref Range: >60 mL/min/1.73 m 2 >60

## 2021-06-01 NOTE — ASSESSMENT & PLAN NOTE
Chronic and stable condition.    Currently taking glimepiride 4 mg BID, Metformin 850 mg TID.  She states that her blood sugars run 108- 150 daily  Her last A1c was in December and it was 8.3  A1C in the office today was 8.7  States diet varies- Not eating breakfast most of the days, protein bar during the day or cheese sticks.  Gets drinks at [employer] in the cold case   Needs retinal scan- order placed  Denies any feelings of hyper or hypoglycemia.  Denies any polydipsia, polyuria, polyphagia.

## 2021-06-01 NOTE — ASSESSMENT & PLAN NOTE
Chronic and stable condition.    Patient currently takes gabapentin 300 mg daily.  She takes this at night as the night neuropathy is the worse.    It is currently controlled with the gabapentin.

## 2021-06-01 NOTE — ASSESSMENT & PLAN NOTE
Orders pending for colonoscopy, mammogram  Patient is hesitant to get these down  States no change in BM  Not interested in Colonoscopy but is willing to do cologuard

## 2021-06-01 NOTE — PROGRESS NOTES
Chief Complaint   Patient presents with   • Establish Care   • Medication Refill      Subjective:     Zenaida Ibrahim is a 75 y.o. female presenting to establish care.     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  She has had experience in multiple areas of nursing.    Benign essential HTN  Chronic and stable condition.    Currently takes amlodipine 10 mg daily, atenolol 50 mg daily, and lisinopril 40 mg daily.    Denies any headaches, lightheadedness, chest pain, lower extremity swelling, or palpitations.     Neuropathy (HCC)  Chronic and stable condition.    Patient currently takes gabapentin 300 mg daily.  She takes this at night as the night neuropathy is the worse.    It is currently controlled with the gabapentin.      Vitamin D deficiency  Chronic and stable condition.    Currently takes vitamin d 1000untis daily      Type 2 diabetes mellitus without complication, with long-term current use of insulin ([facility])  Chronic and stable condition.    Currently taking glimepiride 4 mg BID, Metformin 850 mg TID.  She states that her blood sugars run 108- 150 daily  Her last A1c was in December and it was 8.3  A1C in the office today was 8.7  States diet varies- Not eating breakfast most of the days, protein bar during the day or cheese sticks.  Gets drinks at [employer] in the cold case   Needs retinal scan- order placed  Denies any feelings of hyper or hypoglycemia.  Denies any polydipsia, polyuria, polyphagia.      Healthcare maintenance  Orders pending for colonoscopy, mammogram  Patient is hesitant to get these down  States no change in BM  Not interested in Colonoscopy but is willing to do cologuard    Decreased GFR  From results as of July 2020 no noted changes in her GFR.  We will trend her GFR at the next labs.    Results for ZENAIDA IBRAHIM (MRN [id number]) as of 6/1/2021 08:50   Ref. Range 7/16/2020 07:22   GFR If Non  Latest Ref Range: >60 mL/min/1.73 m 2 >60        Medication refill  Patient requesting refills on all of her medications.        Dyslipidemia  Chronic and stable condition.  Currently taking pravastatin 10 mg daily.  We will get new labs.  Denies any myalgias with this medication.       Review of Systems   Constitutional: Negative for chills, fever and weight loss.   HENT: Negative.    Eyes: Negative.    Respiratory: Negative for cough and shortness of breath.    Cardiovascular: Negative for chest pain, palpitations and leg swelling.   Gastrointestinal: Negative for abdominal pain and blood in stool.   Genitourinary: Negative for hematuria.   Musculoskeletal: Positive for back pain. Negative for myalgias.        Uses gabapentin for this as well as her neuropathy- baseline   Skin: Negative.    Neurological: Negative for dizziness, weakness and headaches.   Psychiatric/Behavioral: Negative for depression and suicidal ideas. The patient does not have insomnia.             Current Outpatient Medications:   •  amLODIPine (NORVASC) 10 MG Tab, Take 1 tablet by mouth every day for 180 days., Disp: 90 tablet, Rfl: 1  •  atenolol (TENORMIN) 50 MG Tab, Take 1 tablet by mouth every day for 180 days., Disp: 90 tablet, Rfl: 1  •  gabapentin (NEURONTIN) 300 MG Cap, TAKE 1 CAPSULE AT BEDTIME, Disp: 90 capsule, Rfl: 1  •  lisinopril (PRINIVIL) 40 MG tablet, Take 1 tablet by mouth every day for 180 days., Disp: 90 tablet, Rfl: 1  •  pravastatin (PRAVACHOL) 10 MG Tab, Take 1 tablet by mouth every day for 180 days., Disp: 90 tablet, Rfl: 1  •  metFORMIN (GLUCOPHAGE) 500 MG Tab, Take 2 Tablets by mouth 2 times a day with meals for 90 days., Disp: 180 tablet, Rfl: 1  •  glimepiride (AMARYL) 4 MG Tab, Take 1 tablet by mouth 2 times a day for 180 days., Disp: 180 tablet, Rfl: 1  •  Empagliflozin (JARDIANCE) 10 MG Tab, Take 1 tablet by mouth every day for 90 days., Disp: 90 tablet, Rfl: 2  •  vitamin D (CHOLECALCIFEROL) 1000 UNIT Tab, Take 1,000 Units by mouth every day., Disp: ,  "Rfl:   •  aspirin EC (ECOTRIN) 81 MG Tablet Delayed Response, Take 81 mg by mouth every day., Disp: , Rfl:   •  glucose blood strip, 1 Each by Other route as needed., Disp: , Rfl:   •  STOOL SOFTENER PO, Take 1 Tab by mouth every day., Disp: , Rfl:     Past Medical History:   Diagnosis Date   • Acute left-sided low back pain with left-sided sciatica 9/3/2019   • Anesthesia    • Backpain    • Dental disorder    • Diabetes    • Heart murmur    • Hypertension    • Leg weakness, bilateral 9/3/2019   • Pain        Past Surgical History:   Procedure Laterality Date   • LUMBAR FUSION POSTERIOR  1/11/2010    Performed by LYNNETTE SMILEY at SURGERY [facility] ORS   • LAMINOTOMY  1/11/2010    Performed by LYNNETTE SMILEY at SURGERY [facility] ORS   • LUMBAR LAMINECTOMY DISKECTOMY  8/31/2009   • OTHER ORTHOPEDIC SURGERY  1989    tibia has a tanna knee to ankle, fibula is resected   • PRIMARY C SECTION  1988   • OTHER  1975    left lumpectomt in breast - benign   • OTHER         Family History   Adopted: Yes       Percocet [oxycodone-acetaminophen], Demerol, and Statins [hmg-coa-r inhibitors]    Allergies, past medical history, past surgical history, family history, social history reviewed and updated    Objective:     Vitals: /78   Pulse 68   Temp 36.3 °C (97.4 °F) (Temporal)   Resp 16   Ht 1.549 m (5' 1\")   SpO2 97%   BMI 27.59 kg/m²   General: Alert, pleasant, NAD  EYES:   PERRL, EOMI, no icterus or pallor.  Conjunctivae and lids normal.   HENT:  Normocephalic.  External ears normal. Tympanic membranes pearly, opaque.  No nasal drainage present.  Oropharynx non-erythematous, mucous membranes moist.  Neck supple.   No thyromegaly or masses palpated. No cervical or supraclavicular lymphadenopathy.  Heart: Regular rate and rhythm.  S1 and S2 normal.  No murmurs appreciated.  Respiratory: Normal respiratory effort.  Clear to auscultation bilaterally.  Abdomen: Non-distended, soft, non-tender, no guarding/rebound. " Bowel sounds present.  No hepatosplenomegaly.  No hernias.  Skin: Warm, dry, no rashes.  Musculoskeletal: Gait is normal.  Moves all extremities well.    Extremities: No clubbing, cyanosis or edema noted.   Neurological: No tremors, sensation grossly intact, tone/strength normal, gait is normal, CN2-12 intact.  Psych:  Affect/mood is normal, judgement is good, memory is intact, grooming is appropriate.    Assessment/Plan:     1. Healthcare maintenance  - POCT Hemoglobin A1C  - REFERRAL FOR RETINAL SCREENING EXAM; Future  - COLOGUARD (FIT DNA)  - CBC WITH DIFFERENTIAL; Future  - Comp Metabolic Panel; Future  - Lipid Profile; Future  - VITAMIN D,25 HYDROXY; Future    2. Medication refill  - amLODIPine (NORVASC) 10 MG Tab; Take 1 tablet by mouth every day for 180 days.  Dispense: 90 tablet; Refill: 1  - atenolol (TENORMIN) 50 MG Tab; Take 1 tablet by mouth every day for 180 days.  Dispense: 90 tablet; Refill: 1  - gabapentin (NEURONTIN) 300 MG Cap; TAKE 1 CAPSULE AT BEDTIME  Dispense: 90 capsule; Refill: 1  - lisinopril (PRINIVIL) 40 MG tablet; Take 1 tablet by mouth every day for 180 days.  Dispense: 90 tablet; Refill: 1  - pravastatin (PRAVACHOL) 10 MG Tab; Take 1 tablet by mouth every day for 180 days.  Dispense: 90 tablet; Refill: 1  - metFORMIN (GLUCOPHAGE) 500 MG Tab; Take 2 Tablets by mouth 2 times a day with meals for 90 days.  Dispense: 180 tablet; Refill: 1  - glimepiride (AMARYL) 4 MG Tab; Take 1 tablet by mouth 2 times a day for 180 days.  Dispense: 180 tablet; Refill: 1    3. Type 2 diabetes mellitus without complication, with long-term current use of insulin (HCC)  - POCT Hemoglobin A1C  - REFERRAL FOR RETINAL SCREENING EXAM; Future  - CBC WITH DIFFERENTIAL; Future  - Comp Metabolic Panel; Future  - metFORMIN (GLUCOPHAGE) 500 MG Tab; Take 2 Tablets by mouth 2 times a day with meals for 90 days.  Dispense: 180 tablet; Refill: 1  - glimepiride (AMARYL) 4 MG Tab; Take 1 tablet by mouth 2 times a day for 180  days.  Dispense: 180 tablet; Refill: 1  - Empagliflozin (JARDIANCE) 10 MG Tab; Take 1 tablet by mouth every day for 90 days.  Dispense: 90 tablet; Refill: 2  Extensive conversation with patient regarding diet, exercise.  Due to her last A1c being 8.3 and today was 8.7 there is concern that her blood sugars are not being controlled.  We will go ahead and start her on a SGL 2 and recheck her in 3 months to see how she is doing.  I discussed with her options to get her started with a diabetic dietitian however she refused at this time stating that she knows what she used to do for her diet.    4. Benign essential HTN  - amLODIPine (NORVASC) 10 MG Tab; Take 1 tablet by mouth every day for 180 days.  Dispense: 90 tablet; Refill: 1  - atenolol (TENORMIN) 50 MG Tab; Take 1 tablet by mouth every day for 180 days.  Dispense: 90 tablet; Refill: 1  - lisinopril (PRINIVIL) 40 MG tablet; Take 1 tablet by mouth every day for 180 days.  Dispense: 90 tablet; Refill: 1  - pravastatin (PRAVACHOL) 10 MG Tab; Take 1 tablet by mouth every day for 180 days.  Dispense: 90 tablet; Refill: 1    5. Neuropathy  - gabapentin (NEURONTIN) 300 MG Cap; TAKE 1 CAPSULE AT BEDTIME  Dispense: 90 capsule; Refill: 1    6. Vitamin D deficiency  - VITAMIN D,25 HYDROXY; Future    7. Decreased GFR  - Comp Metabolic Panel; Future    8. Dyslipidemia  - Lipid Profile; Future  - pravastatin (PRAVACHOL) 10 MG Tab; Take 1 tablet by mouth every day for 180 days.  Dispense: 90 tablet; Refill: 1  9. Mixed hyperlipidemia  - amLODIPine (NORVASC) 10 MG Tab; Take 1 tablet by mouth every day for 180 days.  Dispense: 90 tablet; Refill: 1  - lisinopril (PRINIVIL) 40 MG tablet; Take 1 tablet by mouth every day for 180 days.  Dispense: 90 tablet; Refill: 1  - pravastatin (PRAVACHOL) 10 MG Tab; Take 1 tablet by mouth every day for 180 days.  Dispense: 90 tablet; Refill: 1    10. Chronic midline low back pain with sciatica, sciatica laterality unspecified  - gabapentin  (NEURONTIN) 300 MG Cap; TAKE 1 CAPSULE AT BEDTIME  Dispense: 90 capsule; Refill: 1    11. Screening for colorectal cancer  - COLOGUARD (FIT DNA)  Due to patient not wanting a colonoscopy it was agreed upon that we would do a Cologuard and she is aware that if this is a positive result that she will have to get a colonoscopy.    Discussed with patient possible alternative diagnoses, patient is to take all medications as prescribed.     If symptoms persist FU w/PCP, if symptoms worsen go to emergency room.     If experiencing any side effects from prescribed medications reports to the office immediately or go to emergency room.    Reviewed indication, dosage, usage and potential adverse effects of prescribed medications.     Reviewed risks and benefits of treatment plan. Patient verbalizes understanding of all instruction and verbally agrees to plan.    Discussed plan with the patient, and she agrees to the above.      I personally reviewed prior external notes and test results pertinent to today's visit.        Return in about 3 weeks (around 6/22/2021) for labs and urine .    My total time spent caring for the patient on the day of the encounter was 30 minutes.   This does not include time spent on separately billable procedures/tests.     Please note that this dictation was created using voice recognition software. I have made every reasonable attempt to correct obvious errors, but I expect that there may be errors of grammar and possibly content that I did not discover before finalizing the note.

## 2021-06-01 NOTE — PATIENT INSTRUCTIONS
1. Get labs completed prior to our next visit.  These will be fasting labs, do not eat or drink 8 to 10 hours prior to your labs.  Make sure that you drink lots of water.  We will discuss the results of these at our next appointment.     2.   Orders for cologuard.  If you do not hear from them in the next 3-5 business days please reach out to me through [employer].     3. Prescriptions sent to [facility] mail order.

## 2021-06-01 NOTE — ASSESSMENT & PLAN NOTE
Chronic and stable condition.  Currently taking pravastatin 10 mg daily.  We will get new labs.  Denies any myalgias with this medication.

## 2021-06-01 NOTE — ASSESSMENT & PLAN NOTE
Chronic and stable condition.    Currently takes amlodipine 10 mg daily, atenolol 50 mg daily, and lisinopril 40 mg daily.    Denies any headaches, lightheadedness, chest pain, lower extremity swelling, or palpitations.

## 2021-07-01 ENCOUNTER — APPOINTMENT (OUTPATIENT)
Dept: MEDICAL GROUP | Facility: PHYSICIAN GROUP | Age: 76
End: 2021-07-01
Payer: MEDICARE

## 2021-07-15 ENCOUNTER — HOSPITAL ENCOUNTER (OUTPATIENT)
Dept: LAB | Facility: MEDICAL CENTER | Age: 76
End: 2021-07-15
Attending: NURSE PRACTITIONER
Payer: MEDICARE

## 2021-07-15 DIAGNOSIS — Z00.00 HEALTHCARE MAINTENANCE: ICD-10-CM

## 2021-07-15 DIAGNOSIS — R94.4 DECREASED GFR: ICD-10-CM

## 2021-07-15 DIAGNOSIS — E11.9 TYPE 2 DIABETES MELLITUS WITHOUT COMPLICATION, WITH LONG-TERM CURRENT USE OF INSULIN (HCC): ICD-10-CM

## 2021-07-15 DIAGNOSIS — Z79.4 TYPE 2 DIABETES MELLITUS WITHOUT COMPLICATION, WITH LONG-TERM CURRENT USE OF INSULIN (HCC): ICD-10-CM

## 2021-07-15 DIAGNOSIS — E78.5 DYSLIPIDEMIA: ICD-10-CM

## 2021-07-15 DIAGNOSIS — E55.9 VITAMIN D DEFICIENCY: ICD-10-CM

## 2021-07-15 LAB
25(OH)D3 SERPL-MCNC: 45 NG/ML (ref 30–100)
ALBUMIN SERPL BCP-MCNC: 4.6 G/DL (ref 3.2–4.9)
ALBUMIN/GLOB SERPL: 1.7 G/DL
ALP SERPL-CCNC: 75 U/L (ref 30–99)
ALT SERPL-CCNC: 24 U/L (ref 2–50)
ANION GAP SERPL CALC-SCNC: 14 MMOL/L (ref 7–16)
AST SERPL-CCNC: 27 U/L (ref 12–45)
BASOPHILS # BLD AUTO: 1.4 % (ref 0–1.8)
BASOPHILS # BLD: 0.1 K/UL (ref 0–0.12)
BILIRUB SERPL-MCNC: 0.3 MG/DL (ref 0.1–1.5)
BUN SERPL-MCNC: 16 MG/DL (ref 8–22)
CALCIUM SERPL-MCNC: 9.6 MG/DL (ref 8.5–10.5)
CHLORIDE SERPL-SCNC: 103 MMOL/L (ref 96–112)
CHOLEST SERPL-MCNC: 179 MG/DL (ref 100–199)
CO2 SERPL-SCNC: 22 MMOL/L (ref 20–33)
CREAT SERPL-MCNC: 0.91 MG/DL (ref 0.5–1.4)
EOSINOPHIL # BLD AUTO: 0.3 K/UL (ref 0–0.51)
EOSINOPHIL NFR BLD: 4.3 % (ref 0–6.9)
ERYTHROCYTE [DISTWIDTH] IN BLOOD BY AUTOMATED COUNT: 42.6 FL (ref 35.9–50)
FASTING STATUS PATIENT QL REPORTED: NORMAL
GLOBULIN SER CALC-MCNC: 2.7 G/DL (ref 1.9–3.5)
GLUCOSE SERPL-MCNC: 131 MG/DL (ref 65–99)
HCT VFR BLD AUTO: 45.4 % (ref 37–47)
HDLC SERPL-MCNC: 43 MG/DL
HGB BLD-MCNC: 14.9 G/DL (ref 12–16)
IMM GRANULOCYTES # BLD AUTO: 0.02 K/UL (ref 0–0.11)
IMM GRANULOCYTES NFR BLD AUTO: 0.3 % (ref 0–0.9)
LDLC SERPL CALC-MCNC: 105 MG/DL
LYMPHOCYTES # BLD AUTO: 2.33 K/UL (ref 1–4.8)
LYMPHOCYTES NFR BLD: 33.4 % (ref 22–41)
MCH RBC QN AUTO: 29.9 PG (ref 27–33)
MCHC RBC AUTO-ENTMCNC: 32.8 G/DL (ref 33.6–35)
MCV RBC AUTO: 91.2 FL (ref 81.4–97.8)
MONOCYTES # BLD AUTO: 0.76 K/UL (ref 0–0.85)
MONOCYTES NFR BLD AUTO: 10.9 % (ref 0–13.4)
NEUTROPHILS # BLD AUTO: 3.47 K/UL (ref 2–7.15)
NEUTROPHILS NFR BLD: 49.7 % (ref 44–72)
NRBC # BLD AUTO: 0 K/UL
NRBC BLD-RTO: 0 /100 WBC
PLATELET # BLD AUTO: 319 K/UL (ref 164–446)
PMV BLD AUTO: 10.2 FL (ref 9–12.9)
POTASSIUM SERPL-SCNC: 4.5 MMOL/L (ref 3.6–5.5)
PROT SERPL-MCNC: 7.3 G/DL (ref 6–8.2)
RBC # BLD AUTO: 4.98 M/UL (ref 4.2–5.4)
SODIUM SERPL-SCNC: 139 MMOL/L (ref 135–145)
TRIGL SERPL-MCNC: 153 MG/DL (ref 0–149)
WBC # BLD AUTO: 7 K/UL (ref 4.8–10.8)

## 2021-07-15 PROCEDURE — 80053 COMPREHEN METABOLIC PANEL: CPT

## 2021-07-15 PROCEDURE — 85025 COMPLETE CBC W/AUTO DIFF WBC: CPT

## 2021-07-15 PROCEDURE — 82306 VITAMIN D 25 HYDROXY: CPT

## 2021-07-15 PROCEDURE — 80061 LIPID PANEL: CPT

## 2021-07-15 PROCEDURE — 36415 COLL VENOUS BLD VENIPUNCTURE: CPT

## 2021-07-20 ENCOUNTER — HOSPITAL ENCOUNTER (OUTPATIENT)
Facility: MEDICAL CENTER | Age: 76
End: 2021-07-20
Attending: NURSE PRACTITIONER
Payer: MEDICARE

## 2021-07-20 ENCOUNTER — OFFICE VISIT (OUTPATIENT)
Dept: MEDICAL GROUP | Facility: PHYSICIAN GROUP | Age: 76
End: 2021-07-20
Payer: MEDICARE

## 2021-07-20 VITALS
TEMPERATURE: 97.1 F | SYSTOLIC BLOOD PRESSURE: 148 MMHG | HEIGHT: 61 IN | RESPIRATION RATE: 14 BRPM | OXYGEN SATURATION: 96 % | WEIGHT: 149 LBS | HEART RATE: 66 BPM | BODY MASS INDEX: 28.13 KG/M2 | DIASTOLIC BLOOD PRESSURE: 90 MMHG

## 2021-07-20 DIAGNOSIS — E11.65 UNCONTROLLED TYPE 2 DIABETES MELLITUS WITH HYPERGLYCEMIA (HCC): ICD-10-CM

## 2021-07-20 DIAGNOSIS — E66.9 OBESITY (BMI 30-39.9): ICD-10-CM

## 2021-07-20 DIAGNOSIS — E55.9 VITAMIN D DEFICIENCY: ICD-10-CM

## 2021-07-20 DIAGNOSIS — Z12.31 ENCOUNTER FOR SCREENING MAMMOGRAM FOR MALIGNANT NEOPLASM OF BREAST: ICD-10-CM

## 2021-07-20 DIAGNOSIS — N95.1 MENOPAUSAL STATE: ICD-10-CM

## 2021-07-20 DIAGNOSIS — Z79.4 TYPE 2 DIABETES MELLITUS WITHOUT COMPLICATION, WITH LONG-TERM CURRENT USE OF INSULIN (HCC): ICD-10-CM

## 2021-07-20 DIAGNOSIS — E11.9 TYPE 2 DIABETES MELLITUS WITHOUT COMPLICATION, WITH LONG-TERM CURRENT USE OF INSULIN (HCC): ICD-10-CM

## 2021-07-20 DIAGNOSIS — G62.9 NEUROPATHY: ICD-10-CM

## 2021-07-20 DIAGNOSIS — Z78.0 POSTMENOPAUSAL STATUS (AGE-RELATED) (NATURAL): ICD-10-CM

## 2021-07-20 DIAGNOSIS — E78.5 DYSLIPIDEMIA: ICD-10-CM

## 2021-07-20 DIAGNOSIS — I10 BENIGN ESSENTIAL HTN: ICD-10-CM

## 2021-07-20 PROBLEM — R94.4 DECREASED GFR: Status: RESOLVED | Noted: 2019-07-18 | Resolved: 2021-07-20

## 2021-07-20 PROBLEM — Z76.0 MEDICATION REFILL: Status: RESOLVED | Noted: 2020-03-20 | Resolved: 2021-07-20

## 2021-07-20 PROBLEM — Z00.00 HEALTHCARE MAINTENANCE: Status: RESOLVED | Noted: 2021-06-01 | Resolved: 2021-07-20

## 2021-07-20 PROCEDURE — 99213 OFFICE O/P EST LOW 20 MIN: CPT | Performed by: NURSE PRACTITIONER

## 2021-07-20 PROCEDURE — 82570 ASSAY OF URINE CREATININE: CPT

## 2021-07-20 PROCEDURE — 82043 UR ALBUMIN QUANTITATIVE: CPT

## 2021-07-20 RX ORDER — EMPAGLIFLOZIN 10 MG/1
1 TABLET, FILM COATED ORAL DAILY
Qty: 90 TABLET | Refills: 1 | Status: SHIPPED | OUTPATIENT
Start: 2021-07-20 | End: 2021-07-20

## 2021-07-20 RX ORDER — EMPAGLIFLOZIN 10 MG/1
1 TABLET, FILM COATED ORAL DAILY
Qty: 30 TABLET | Refills: 1 | Status: SHIPPED | OUTPATIENT
Start: 2021-07-20 | End: 2022-03-24

## 2021-07-20 ASSESSMENT — FIBROSIS 4 INDEX: FIB4 SCORE: 1.28

## 2021-07-20 ASSESSMENT — ENCOUNTER SYMPTOMS
CHILLS: 0
WEAKNESS: 0
DEPRESSION: 0
ABDOMINAL PAIN: 0
WEIGHT LOSS: 0
MYALGIAS: 0
FEVER: 0
PALPITATIONS: 0
DIZZINESS: 0
SHORTNESS OF BREATH: 0
HEADACHES: 0

## 2021-07-20 NOTE — ASSESSMENT & PLAN NOTE
Chronic and stable condition.    Currently taking pravastatin 10 mg daily  Results for MANDO IBRAHIM (MRN [id number]) as of 7/20/2021 15:03   Ref. Range 7/15/2021 07:40   Cholesterol,Tot Latest Ref Range: 100 - 199 mg/dL 179   Triglycerides Latest Ref Range: 0 - 149 mg/dL 153 (H)   HDL Latest Ref Range: >=40 mg/dL 43   LDL Latest Ref Range: <100 mg/dL 105 (H)

## 2021-07-20 NOTE — ASSESSMENT & PLAN NOTE
Chronic and stable condition.  Currently takes vitamin D 1000 units daily.  Results for GALEAS-DENAVI MANDO E (MRN [id number]) as of 7/20/2021 15:03   Ref. Range 7/15/2021 07:40   25-Hydroxy   Vitamin D 25 Latest Ref Range: 30 - 100 ng/mL 45

## 2021-07-20 NOTE — PROGRESS NOTES
CC:  Chief Complaint   Patient presents with   • Lab Results   • Medication Refill     JARDIANCE       HISTORY OF PRESENT ILLNESS: Patient is a 75 y.o. female established patient presenting for follow up labs and DM medication.      Type 2 diabetes mellitus without complication, with long-term current use of insulin (HCC)  Chronic and exacerbated condition.    Patient states that she did not start her Jardiance which we prescribed last visit due to cost of this 3-month supply from her mail prescription from [facility] [employer]   States that she would like us to send a 30-day supply that she can pay months my month.    When discussing if she had a diet change since her last visit she states no but she just eats healthy like she always has.    She works 12-hour shifts and is on her feet most the time which she feels is adequate for her exercise   She also feels that walking her 7 dogs on her off day does not exercise as well which she states is not at a fast pace.      Benign essential HTN  Chronic and stable condition   States that she has been taking her blood pressure at home only occasionally.    Currently using atenolol, lisinopril, amlodipine.    Blood pressure in the office today was 148/90.    Denies headaches, lightheadedness, shortness of breath, chest pressure.      Neuropathy (HCC)  Chronic and stable condition.    Has gabapentin 300 mg however she has not taken it for the last week.    She states that she only takes it 2-3 times a week to the side effects of making it feel like it knocks her out cold.      Obesity (BMI 30-39.9)  Patient refuses to get weighed in office today she told the MA what her weight was at home       Vitamin D deficiency  Chronic and stable condition.  Currently takes vitamin D 1000 units daily.  Results for MANDO IBRAHIM (MRN [id number]) as of 7/20/2021 15:03   Ref. Range 7/15/2021 07:40   25-Hydroxy   Vitamin D 25 Latest Ref Range: 30 - 100 ng/mL 45       Dyslipidemia  Chronic and stable  condition.    Currently taking pravastatin 10 mg daily  Results for MANDO IBRAHIM (MRN [id number]) as of 7/20/2021 15:03   Ref. Range 7/15/2021 07:40   Cholesterol,Tot Latest Ref Range: 100 - 199 mg/dL 179   Triglycerides Latest Ref Range: 0 - 149 mg/dL 153 (H)   HDL Latest Ref Range: >=40 mg/dL 43   LDL Latest Ref Range: <100 mg/dL 105 (H)           Patient Active Problem List    Diagnosis Date Noted   • Type 2 diabetes mellitus without complication, with long-term current use of insulin (HCC) 11/19/2019   • Dyslipidemia 11/19/2019   • Neuropathy 01/17/2019   • Vitamin D deficiency 01/17/2019   • Obesity (BMI 30-39.9) 07/17/2018   • Benign essential HTN 05/21/2018      Allergies:Percocet [oxycodone-acetaminophen], Demerol, and Statins [hmg-coa-r inhibitors]    Current Outpatient Medications   Medication Sig Dispense Refill   • Empagliflozin (JARDIANCE) 10 MG Tab Take 1 tablet by mouth every day. 30 tablet 1   • amLODIPine (NORVASC) 10 MG Tab Take 1 tablet by mouth every day for 180 days. 90 tablet 1   • atenolol (TENORMIN) 50 MG Tab Take 1 tablet by mouth every day for 180 days. 90 tablet 1   • gabapentin (NEURONTIN) 300 MG Cap TAKE 1 CAPSULE AT BEDTIME 90 capsule 1   • lisinopril (PRINIVIL) 40 MG tablet Take 1 tablet by mouth every day for 180 days. 90 tablet 1   • pravastatin (PRAVACHOL) 10 MG Tab Take 1 tablet by mouth every day for 180 days. 90 tablet 1   • metFORMIN (GLUCOPHAGE) 500 MG Tab Take 2 Tablets by mouth 2 times a day with meals for 90 days. 180 tablet 1   • glimepiride (AMARYL) 4 MG Tab Take 1 tablet by mouth 2 times a day for 180 days. 180 tablet 1   • vitamin D (CHOLECALCIFEROL) 1000 UNIT Tab Take 1,000 Units by mouth every day.     • aspirin EC (ECOTRIN) 81 MG Tablet Delayed Response Take 81 mg by mouth every day.     • glucose blood strip 1 Each by Other route as needed.     • STOOL SOFTENER PO Take 1 Tab by mouth every day.       No current facility-administered medications for this  "visit.       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   Respiratory: Negative for shortness of breath.    Cardiovascular: Negative for chest pain, palpitations and leg swelling.   Gastrointestinal: Negative for abdominal pain.   Musculoskeletal: Negative for myalgias.   Neurological: Negative for dizziness, weakness and headaches.   Psychiatric/Behavioral: Negative for depression.             - NOTE: All other systems reviewed and are negative, except as in HPI.      Exam:    /90   Pulse 66   Temp 36.2 °C (97.1 °F) (Temporal)   Resp 14   Ht 1.549 m (5' 1\")   Wt 67.6 kg (149 lb)   SpO2 96%  Body mass index is 28.15 kg/m².    General: Alert, pleasant, NAD  EYES:   PERRL, EOMI, no icterus or pallor.  Conjunctivae and lids normal.   HENT:  Normocephalic.  External ears normal.   Heart: Regular rate and rhythm.  S1 and S2 normal.  No murmurs appreciated.  Respiratory: Normal respiratory effort.  Clear to auscultation bilaterally.  Skin: Warm, dry, no rashes.  Musculoskeletal: Gait is normal.  Moves all extremities well.    Extremities: No clubbing, cyanosis or edema noted.   Neurological: No tremors, sensation grossly intact, tone/strength normal, gait is normal, CN2-12 intact.  Psych:  Affect/mood is normal, judgement is good, memory is intact, grooming is appropriate.      LABS: 7/15/2021: Results reviewed and discussed with the patient, questions answered.    Assessment/Plan:  1. Uncontrolled type 2 diabetes mellitus with hyperglycemia (HCC)  - Empagliflozin (JARDIANCE) 10 MG Tab; Take 1 tablet by mouth every day.  Dispense: 30 tablet; Refill: " 1  - MICROALBUMIN CREAT RATIO URINE; Future  Patient has not picked up her Jardiance as of yet due to cost.  She requested that we send a 30-day supply  Discussed with patient at length the importance of getting started on this medication and we can look at other medications due to her hemoglobin A1c in June being 10.1.  Educated patient on risk of eyesight, renal function, furthering her neuropathy.    2. Type 2 diabetes mellitus without complication, with long-term current use of insulin (HCC)  - Empagliflozin (JARDIANCE) 10 MG Tab; Take 1 tablet by mouth every day.  Dispense: 30 tablet; Refill: 1  - MICROALBUMIN CREAT RATIO URINE; Future    3. Benign essential HTN  Requested patient bring in a blood pressure journal at the next visit to determine her baseline    4. Neuropathy  Stable   continue with treatment    5. Obesity (BMI 30-39.9)  Discussed with patient at length regarding exercise and diet  Patient did not want to get weighed today in the office so we were unable to determine if this is specifically her BMI or not  Discussed with patient increasing her exercise level, at this time she walks her dogs however she states it is a comfortable walk    6. Postmenopausal status (age-related) (natural)  - DS-BONE DENSITY STUDY (DEXA)    7. Menopausal state  - DS-BONE DENSITY STUDY (DEXA)    8. Encounter for screening mammogram for malignant neoplasm of breast  - MA-SCREENING MAMMO BILAT W/TOMOSYNTHESIS W/CAD; Future    9. Vitamin D deficiency  Stable  Continue with treatment    10. Dyslipidemia  Stable  Continue treatment       Discussed with patient possible alternative diagnoses, patient is to take all medications as prescribed.     If symptoms persist FU w/PCP, if symptoms worsen go to emergency room.     If experiencing any side effects from prescribed medications reports to the office immediately or go to emergency room.    Reviewed indication, dosage, usage and potential adverse effects of prescribed  medications.     Reviewed risks and benefits of treatment plan. Patient verbalizes understanding of all instruction and verbally agrees to plan.      Return in about 7 weeks (around 9/7/2021) for follow up on HA1C, medications, mammogram, cologuard and dexa scan.    My total time spent caring for the patient on the day of the encounter was 20 minutes.   This does not include time spent on separately billable procedures/tests.     Please note that this dictation was created using voice recognition software. I have made every reasonable attempt to correct obvious errors, but I expect that there are errors of grammar and possibly content that I did not discover before finalizing the note.

## 2021-07-20 NOTE — ASSESSMENT & PLAN NOTE
Chronic and stable condition   States that she has been taking her blood pressure at home only occasionally.    Currently using atenolol, lisinopril, amlodipine.    Blood pressure in the office today was 148/90.    Denies headaches, lightheadedness, shortness of breath, chest pressure.

## 2021-07-20 NOTE — PATIENT INSTRUCTIONS
1. Call insurance or the [employer] mail-in scripts a call to find out cost of jardiance by month    2. PLEASE call if you can not afford that medication ASAP so we can start you on something else    3. Schedule Dexa Scan and Mammogram    4. Need to schedule with for Retinal Screening

## 2021-07-20 NOTE — ASSESSMENT & PLAN NOTE
Chronic and stable condition.    Has gabapentin 300 mg however she has not taken it for the last week.    She states that she only takes it 2-3 times a week to the side effects of making it feel like it knocks her out cold.

## 2021-07-20 NOTE — ASSESSMENT & PLAN NOTE
Chronic and exacerbated condition.    Patient states that she did not start her Jardiance which we prescribed last visit due to cost of this 3-month supply from her mail prescription from "[employer]   States that she would like us to send a 30-day supply that she can pay months my month.    When discussing if she had a diet change since her last visit she states no but she just eats healthy like she always has.    She works 12-hour shifts and is on her feet most the time which she feels is adequate for her exercise   She also feels that walking her 7 dogs on her off day does not exercise as well which she states is not at a fast pace.

## 2021-07-21 LAB
CREAT UR-MCNC: 65.82 MG/DL
MICROALBUMIN UR-MCNC: 5.4 MG/DL
MICROALBUMIN/CREAT UR: 82 MG/G (ref 0–30)

## 2021-07-22 DIAGNOSIS — Z79.4 TYPE 2 DIABETES MELLITUS WITHOUT COMPLICATION, WITH LONG-TERM CURRENT USE OF INSULIN (HCC): ICD-10-CM

## 2021-07-22 DIAGNOSIS — E11.9 TYPE 2 DIABETES MELLITUS WITHOUT COMPLICATION, WITH LONG-TERM CURRENT USE OF INSULIN (HCC): ICD-10-CM

## 2021-10-13 ENCOUNTER — OFFICE VISIT (OUTPATIENT)
Dept: MEDICAL GROUP | Facility: PHYSICIAN GROUP | Age: 76
End: 2021-10-13
Payer: MEDICARE

## 2021-10-13 VITALS
DIASTOLIC BLOOD PRESSURE: 80 MMHG | OXYGEN SATURATION: 94 % | WEIGHT: 149 LBS | RESPIRATION RATE: 18 BRPM | HEART RATE: 69 BPM | SYSTOLIC BLOOD PRESSURE: 142 MMHG | BODY MASS INDEX: 28.13 KG/M2 | TEMPERATURE: 96.8 F | HEIGHT: 61 IN

## 2021-10-13 DIAGNOSIS — Z79.4 TYPE 2 DIABETES MELLITUS WITHOUT COMPLICATION, WITH LONG-TERM CURRENT USE OF INSULIN (HCC): ICD-10-CM

## 2021-10-13 DIAGNOSIS — I10 BENIGN ESSENTIAL HTN: ICD-10-CM

## 2021-10-13 DIAGNOSIS — Z76.0 MEDICATION REFILL: ICD-10-CM

## 2021-10-13 DIAGNOSIS — E11.9 TYPE 2 DIABETES MELLITUS WITHOUT COMPLICATION, WITH LONG-TERM CURRENT USE OF INSULIN (HCC): ICD-10-CM

## 2021-10-13 DIAGNOSIS — E78.2 MIXED HYPERLIPIDEMIA: ICD-10-CM

## 2021-10-13 DIAGNOSIS — G89.29 CHRONIC MIDLINE LOW BACK PAIN WITH SCIATICA, SCIATICA LATERALITY UNSPECIFIED: ICD-10-CM

## 2021-10-13 DIAGNOSIS — E78.5 DYSLIPIDEMIA: ICD-10-CM

## 2021-10-13 DIAGNOSIS — M54.40 CHRONIC MIDLINE LOW BACK PAIN WITH SCIATICA, SCIATICA LATERALITY UNSPECIFIED: ICD-10-CM

## 2021-10-13 DIAGNOSIS — Z23 NEED FOR VACCINATION: ICD-10-CM

## 2021-10-13 DIAGNOSIS — G62.9 NEUROPATHY: ICD-10-CM

## 2021-10-13 PROCEDURE — 99214 OFFICE O/P EST MOD 30 MIN: CPT | Mod: 25 | Performed by: NURSE PRACTITIONER

## 2021-10-13 PROCEDURE — G0008 ADMIN INFLUENZA VIRUS VAC: HCPCS | Performed by: NURSE PRACTITIONER

## 2021-10-13 PROCEDURE — 90662 IIV NO PRSV INCREASED AG IM: CPT | Performed by: NURSE PRACTITIONER

## 2021-10-13 RX ORDER — PRAVASTATIN SODIUM 10 MG
10 TABLET ORAL
Qty: 90 TABLET | Refills: 1 | Status: SHIPPED | OUTPATIENT
Start: 2021-10-13 | End: 2022-01-13 | Stop reason: SDUPTHER

## 2021-10-13 RX ORDER — AMLODIPINE BESYLATE 10 MG/1
10 TABLET ORAL
Qty: 90 TABLET | Refills: 1 | Status: SHIPPED | OUTPATIENT
Start: 2021-10-13 | End: 2022-01-13 | Stop reason: SDUPTHER

## 2021-10-13 RX ORDER — LISINOPRIL 40 MG/1
40 TABLET ORAL
Qty: 90 TABLET | Refills: 1 | Status: SHIPPED | OUTPATIENT
Start: 2021-10-13 | End: 2022-01-13 | Stop reason: SDUPTHER

## 2021-10-13 RX ORDER — GLIMEPIRIDE 4 MG/1
4 TABLET ORAL 2 TIMES DAILY
Qty: 180 TABLET | Refills: 1 | Status: SHIPPED | OUTPATIENT
Start: 2021-10-13 | End: 2021-11-17

## 2021-10-13 RX ORDER — GABAPENTIN 300 MG/1
CAPSULE ORAL
Qty: 90 CAPSULE | Refills: 1 | Status: SHIPPED | OUTPATIENT
Start: 2021-10-13 | End: 2022-03-24

## 2021-10-13 RX ORDER — ATENOLOL 50 MG/1
50 TABLET ORAL DAILY
Qty: 90 TABLET | Refills: 1 | Status: SHIPPED | OUTPATIENT
Start: 2021-10-13 | End: 2022-01-13 | Stop reason: SDUPTHER

## 2021-10-13 ASSESSMENT — FIBROSIS 4 INDEX: FIB4 SCORE: 1.28

## 2021-10-13 NOTE — ASSESSMENT & PLAN NOTE
Chronic problem.  On Metformin 850 mg twice daily and Amaryl 4 mg twice daily.  Was previously placed on Jardiance but could not afford it due to cost.  Latest A1c 8.7 from June 2021.  GFR normal.  Admits to neuropathy.  Last retina check several years ago.

## 2021-10-13 NOTE — ASSESSMENT & PLAN NOTE
Chronic problem.  On lisinopril 40 mg, amlodipine 10 mg daily and atenolol 50 mg daily.  BP today is 142/80.  Denies CP, dyspnea, dizziness.

## 2021-10-13 NOTE — PROGRESS NOTES
Chief Complaint   Patient presents with   • Establish Care       HISTORY OF PRESENT ILLNESS: Patient is a 75 y.o. female, established patient who presents today to discuss medical problems as listed below:    Health Maintenance:  COMPLETED     Type 2 diabetes mellitus without complication, with long-term current use of insulin (HCC)  Chronic problem.  On Metformin 850 mg twice daily and Amaryl 4 mg twice daily.  Was previously placed on Jardiance but could not afford it due to cost.  Latest A1c 8.7 from June 2021.  GFR normal.  Admits to neuropathy.  Last retina check several years ago.                         Benign essential HTN  Chronic problem.  On lisinopril 40 mg, amlodipine 10 mg daily and atenolol 50 mg daily.  BP today is 142/80.  Denies CP, dyspnea, dizziness.     Dyslipidemia  Chronic and stable.  On pravastatin 10 mg nightly.   Ref. Range 7/15/2021 07:40   Cholesterol,Tot Latest Ref Range: 100 - 199 mg/dL 179   Triglycerides Latest Ref Range: 0 - 149 mg/dL 153 (H)   HDL Latest Ref Range: >=40 mg/dL 43   LDL Latest Ref Range: <100 mg/dL 105 (H)   The 10-year ASCVD risk score (Falcon KVNG Jr., et al., 2013) is: 39.3%    Values used to calculate the score:      Age: 75 years      Sex: Female      Is Non- : No      Diabetic: Yes      Tobacco smoker: No      Systolic Blood Pressure: 142 mmHg      Is BP treated: Yes      HDL Cholesterol: 43 mg/dL      Total Cholesterol: 179 mg/dL    Neuropathy (HCC)  Chronic and stable.  On gabapentin 300 mg daily, needs refills.      Patient Active Problem List    Diagnosis Date Noted   • Type 2 diabetes mellitus without complication, with long-term current use of insulin (HCC) 11/19/2019   • Dyslipidemia 11/19/2019   • Neuropathy 01/17/2019   • Vitamin D deficiency 01/17/2019   • Obesity (BMI 30-39.9) 07/17/2018   • Benign essential HTN 05/21/2018        Allergies: Percocet [oxycodone-acetaminophen], Demerol, and Statins [hmg-coa-r inhibitors]    Current  Outpatient Medications   Medication Sig Dispense Refill   • lisinopril (PRINIVIL) 40 MG tablet Take 1 Tablet by mouth every day for 180 days. 90 Tablet 1   • atenolol (TENORMIN) 50 MG Tab Take 1 Tablet by mouth every day for 180 days. 90 Tablet 1   • gabapentin (NEURONTIN) 300 MG Cap TAKE 1 CAPSULE AT BEDTIME 90 Capsule 1   • metFORMIN (GLUCOPHAGE) 850 MG Tab Take 1 Tablet by mouth 2 times a day with meals. 180 Tablet 1   • amLODIPine (NORVASC) 10 MG Tab Take 1 Tablet by mouth every day for 180 days. 90 Tablet 1   • pravastatin (PRAVACHOL) 10 MG Tab Take 1 Tablet by mouth every day for 180 days. 90 Tablet 1   • glimepiride (AMARYL) 4 MG Tab Take 1 Tablet by mouth 2 times a day for 180 days. 180 Tablet 1   • vitamin D (CHOLECALCIFEROL) 1000 UNIT Tab Take 1,000 Units by mouth every day.     • aspirin EC (ECOTRIN) 81 MG Tablet Delayed Response Take 81 mg by mouth every day.     • glucose blood strip 1 Each by Other route as needed.     • STOOL SOFTENER PO Take 1 Tab by mouth every day.     • Empagliflozin (JARDIANCE) 10 MG Tab Take 1 tablet by mouth every day. (Patient not taking: Reported on 10/13/2021) 30 tablet 1     No current facility-administered medications for this visit.       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    Review of Systems:     - Constitutional: Negative for fever, chills, unexpected weight change, and fatigue/generalized weakness.     - HEENT: Negative for headaches, vision changes, hearing changes, ear pain, ear discharge, rhinorrhea, sinus congestion, sore throat, and neck pain.      - Respiratory:  "Negative for cough, sputum production, chest congestion, dyspnea, wheezing, and crackles.      - Cardiovascular: Negative for chest pain, palpitations, orthopnea, and bilateral lower extremity edema.     - Gastrointestinal: Negative for heartburn, nausea, vomiting, abdominal pain, hematochezia, melena, diarrhea, constipation, and greasy/foul-smelling stools.     - Genitourinary: Negative for dysuria, polyuria, hematuria, pyuria, urinary urgency, and urinary incontinence.    - Musculoskeletal: Negative for myalgias, back pain, and joint pain.     - Skin: Negative for rash, itching, cyanotic skin color change.     - Neurological: Negative for dizziness, tingling, tremors, focal sensory deficit, focal weakness and headaches.     - Endo/Heme/Allergies: Does not bruise/bleed easily.     - Psychiatric/Behavioral: Negative for depression, suicidal/homicidal ideation and memory loss.      All other systems reviewed and are negative    Exam:    /80   Pulse 69   Temp 36 °C (96.8 °F) (Temporal)   Resp 18   Ht 1.549 m (5' 1\")   Wt 67.6 kg (149 lb) Comment: pt refuse weight. Told me her weight instead  SpO2 94%   BMI 28.15 kg/m²  Body mass index is 28.15 kg/m².    Physical Exam:  Constitutional: Well-developed and well-nourished. Not diaphoretic. No distress.   Skin: Skin is warm and dry. No rash noted.  Head: Atraumatic without lesions.  Eyes: Conjunctivae and extraocular motions are normal. Pupils are equal, round, and reactive to light. No scleral icterus.   Ears:  External ears unremarkable. Tympanic membranes clear and intact.  Nose: Nares patent. Septum midline. Turbinates without erythema nor edema. No discharge.   Mouth/Throat: Dentition is normal. Tongue normal. Oropharynx is clear and moist. Posterior pharynx without erythema or exudates.  Neck: Supple, trachea midline. Normal range of motion. No thyromegaly present. No lymphadenopathy--cervical or supraclavicular.  Cardiovascular: Regular rate and rhythm, " S1 and S2 without murmur, rubs, or gallops.    Chest: Effort normal. Clear to auscultation throughout. No adventitious sounds. No CVA tenderness.  Abdomen: Soft, non tender, and without distention. Active bowel sounds in all four quadrants. No rebound, guarding, masses or HSM.  : Negative for dysuria, polyuria, hematuria, pyuria, urinary urgency, and urinary incontinence.  Extremities: No cyanosis, clubbing, erythema, nor edema. Distal pulses intact and symmetric.   Musculoskeletal: All major joints AROM full in all directions without pain.  Neurological: Alert and oriented x 3. DTRs 2+/3 and symmetric. No cranial nerve deficit. 5/5 myotomes. Sensation intact. Negative Rhomberg.  Psychiatric:  Behavior, mood, and affect are appropriate.  MA/nursing note and vitals reviewed.    LABS: 6/2021  results reviewed and discussed with the patient, questions answered.    My total time spent caring for the patient on the day of the encounter was 25 minutes.   This does not include time spent on separately billable procedures/tests.     Assessment/Plan:  1. Need for vaccination  - Influenza Vaccine, High Dose (65+ Only)    2. Type 2 diabetes mellitus without complication, with long-term current use of insulin (HCC)  Uncontrolled, stable.  Continue current medications.  Will appreciate additional help manage diabetes from pharmacist we will follow-up in 3 months  - REFERRAL TO OPHTHALMOLOGY  - metFORMIN (GLUCOPHAGE) 850 MG Tab; Take 1 Tablet by mouth 2 times a day with meals.  Dispense: 180 Tablet; Refill: 1  - glimepiride (AMARYL) 4 MG Tab; Take 1 Tablet by mouth 2 times a day for 180 days.  Dispense: 180 Tablet; Refill: 1  - REFERRAL TO PHARMACOTHERAPY SERVICE  - Comp Metabolic Panel; Future  - HEMOGLOBIN A1C; Future    3. Benign essential HTN  Stable on current regimen.  Continue.  Refills given  - lisinopril (PRINIVIL) 40 MG tablet; Take 1 Tablet by mouth every day for 180 days.  Dispense: 90 Tablet; Refill: 1  - atenolol  (TENORMIN) 50 MG Tab; Take 1 Tablet by mouth every day for 180 days.  Dispense: 90 Tablet; Refill: 1  - amLODIPine (NORVASC) 10 MG Tab; Take 1 Tablet by mouth every day for 180 days.  Dispense: 90 Tablet; Refill: 1  - pravastatin (PRAVACHOL) 10 MG Tab; Take 1 Tablet by mouth every day for 180 days.  Dispense: 90 Tablet; Refill: 1  - Comp Metabolic Panel; Future    4. Dyslipidemia  Stable on current regimen.  Continue.  Refills given  - pravastatin (PRAVACHOL) 10 MG Tab; Take 1 Tablet by mouth every day for 180 days.  Dispense: 90 Tablet; Refill: 1  - Lipid Profile; Future    5. Mixed hyperlipidemia  As discussed in 4  - lisinopril (PRINIVIL) 40 MG tablet; Take 1 Tablet by mouth every day for 180 days.  Dispense: 90 Tablet; Refill: 1  - amLODIPine (NORVASC) 10 MG Tab; Take 1 Tablet by mouth every day for 180 days.  Dispense: 90 Tablet; Refill: 1  - pravastatin (PRAVACHOL) 10 MG Tab; Take 1 Tablet by mouth every day for 180 days.  Dispense: 90 Tablet; Refill: 1    6. Medication refill  - lisinopril (PRINIVIL) 40 MG tablet; Take 1 Tablet by mouth every day for 180 days.  Dispense: 90 Tablet; Refill: 1  - atenolol (TENORMIN) 50 MG Tab; Take 1 Tablet by mouth every day for 180 days.  Dispense: 90 Tablet; Refill: 1  - gabapentin (NEURONTIN) 300 MG Cap; TAKE 1 CAPSULE AT BEDTIME  Dispense: 90 Capsule; Refill: 1  - amLODIPine (NORVASC) 10 MG Tab; Take 1 Tablet by mouth every day for 180 days.  Dispense: 90 Tablet; Refill: 1  - pravastatin (PRAVACHOL) 10 MG Tab; Take 1 Tablet by mouth every day for 180 days.  Dispense: 90 Tablet; Refill: 1  - glimepiride (AMARYL) 4 MG Tab; Take 1 Tablet by mouth 2 times a day for 180 days.  Dispense: 180 Tablet; Refill: 1    7. Chronic midline low back pain with sciatica, sciatica laterality unspecified  Stable on current regimen.  Continue.  Refills given.  - gabapentin (NEURONTIN) 300 MG Cap; TAKE 1 CAPSULE AT BEDTIME  Dispense: 90 Capsule; Refill: 1    8. Neuropathy  Stable on current  regimen.  Continue.  Refills given.  - gabapentin (NEURONTIN) 300 MG Cap; TAKE 1 CAPSULE AT BEDTIME  Dispense: 90 Capsule; Refill: 1       Discussed with patient possible alternative diagnoses, pt is to take all medications as prescribed. If symptoms persist FU w/PCP, if symptoms worsen go to emergency room. If experiencing any side effects from prescribed medications reports to the office immediately or go to emergency room.  Reviewed indication, dosage, usage and potential adverse effects of prescribed medications. Reviewed risks and benefits of treatment plan. Patient verbalizes understanding of all instruction and verbally agrees to plan.    No follow-ups on file. 3 mos

## 2021-10-13 NOTE — ASSESSMENT & PLAN NOTE
Chronic and stable.  On pravastatin 10 mg nightly.   Ref. Range 7/15/2021 07:40   Cholesterol,Tot Latest Ref Range: 100 - 199 mg/dL 179   Triglycerides Latest Ref Range: 0 - 149 mg/dL 153 (H)   HDL Latest Ref Range: >=40 mg/dL 43   LDL Latest Ref Range: <100 mg/dL 105 (H)   The 10-year ASCVD risk score (Angely CALDERON Jr., et al., 2013) is: 39.3%    Values used to calculate the score:      Age: 75 years      Sex: Female      Is Non- : No      Diabetic: Yes      Tobacco smoker: No      Systolic Blood Pressure: 142 mmHg      Is BP treated: Yes      HDL Cholesterol: 43 mg/dL      Total Cholesterol: 179 mg/dL

## 2021-10-14 ENCOUNTER — DOCUMENTATION (OUTPATIENT)
Dept: VASCULAR LAB | Facility: MEDICAL CENTER | Age: 76
End: 2021-10-14

## 2021-10-14 NOTE — PROGRESS NOTES
[facility]    Received DM pharmacotherapy referral from KIERA Holland on 10/13/21    Scheduled NP 11/3    Insurance: Medicare  PCP: [facility]  Locations to be seen: Any    [facility] at [phone], fax [phone]    Denise Yancey, IsaacD

## 2021-10-23 DIAGNOSIS — E11.9 TYPE 2 DIABETES MELLITUS WITHOUT COMPLICATION, WITH LONG-TERM CURRENT USE OF INSULIN (HCC): ICD-10-CM

## 2021-10-23 DIAGNOSIS — Z76.0 MEDICATION REFILL: ICD-10-CM

## 2021-10-23 DIAGNOSIS — Z79.4 TYPE 2 DIABETES MELLITUS WITHOUT COMPLICATION, WITH LONG-TERM CURRENT USE OF INSULIN (HCC): ICD-10-CM

## 2021-10-29 DIAGNOSIS — E11.9 TYPE 2 DIABETES MELLITUS WITHOUT COMPLICATION, WITH LONG-TERM CURRENT USE OF INSULIN (HCC): ICD-10-CM

## 2021-10-29 DIAGNOSIS — Z76.0 MEDICATION REFILL: ICD-10-CM

## 2021-10-29 DIAGNOSIS — Z79.4 TYPE 2 DIABETES MELLITUS WITHOUT COMPLICATION, WITH LONG-TERM CURRENT USE OF INSULIN (HCC): ICD-10-CM

## 2021-11-05 ENCOUNTER — DOCUMENTATION (OUTPATIENT)
Dept: VASCULAR LAB | Facility: MEDICAL CENTER | Age: 76
End: 2021-11-05

## 2021-11-05 NOTE — PROGRESS NOTES
Called pt regarding missed pharmacotherapy appt - pt answered, but was at work. She was unable to schedule appt while on the phone and opted for c/b.     Will f/u at a later time.    Manuel Owens, IsaacD

## 2021-11-08 ENCOUNTER — DOCUMENTATION (OUTPATIENT)
Dept: VASCULAR LAB | Facility: MEDICAL CENTER | Age: 76
End: 2021-11-08

## 2021-11-08 NOTE — PROGRESS NOTES
[facility]    Called and spoke with pt regarding missed DM appt.    Rescheduled appt to 12/8/21 at 7:45 am      Paolo Leon, PharmD, [facility] at [phone], fax [phone]

## 2021-11-14 DIAGNOSIS — Z79.4 TYPE 2 DIABETES MELLITUS WITHOUT COMPLICATION, WITH LONG-TERM CURRENT USE OF INSULIN (HCC): ICD-10-CM

## 2021-11-14 DIAGNOSIS — E11.9 TYPE 2 DIABETES MELLITUS WITHOUT COMPLICATION, WITH LONG-TERM CURRENT USE OF INSULIN (HCC): ICD-10-CM

## 2021-11-14 DIAGNOSIS — Z76.0 MEDICATION REFILL: ICD-10-CM

## 2021-11-17 RX ORDER — GLIMEPIRIDE 4 MG/1
TABLET ORAL
Qty: 180 TABLET | Refills: 1 | Status: SHIPPED | OUTPATIENT
Start: 2021-11-17 | End: 2022-01-13 | Stop reason: SDUPTHER

## 2021-12-08 ENCOUNTER — NON-PROVIDER VISIT (OUTPATIENT)
Dept: MEDICAL GROUP | Facility: PHYSICIAN GROUP | Age: 76
End: 2021-12-08
Payer: MEDICARE

## 2021-12-08 ENCOUNTER — ANTICOAGULATION MONITORING (OUTPATIENT)
Dept: MEDICAL GROUP | Facility: PHYSICIAN GROUP | Age: 76
End: 2021-12-08

## 2021-12-08 DIAGNOSIS — E11.65 TYPE 2 DIABETES MELLITUS WITH HYPERGLYCEMIA, WITHOUT LONG-TERM CURRENT USE OF INSULIN (HCC): ICD-10-CM

## 2021-12-08 LAB
GLUCOSE BLD-MCNC: 269 MG/DL (ref 70–100)
HBA1C MFR BLD: 9.7 % (ref 0–5.6)
INT CON NEG: ABNORMAL
INT CON POS: ABNORMAL

## 2021-12-08 PROCEDURE — 99211 OFF/OP EST MAY X REQ PHY/QHP: CPT | Performed by: FAMILY MEDICINE

## 2021-12-08 NOTE — NON-PROVIDER
Patient Consult Note    TIME IN: 0732    TIME OUT: 8:04    Primary care physician: KIERA Holland    Reason for consult: Management of Uncontrolled Type 2 Diabetes    HPI:  Zenaida Rios is a 76 y.o. old patient who comes in today for evaluation of above stated problem.    Most Recent HbA1c:   Lab Results   Component Value Date/Time    HBA1C 9.7 (A) 12/08/2021 12:20 PM      Lab Results   Component Value Date/Time    CREATININE 0.91 07/15/2021 07:40 AM        Recent Labs     12/08/21  1220   POCGLUCOSE 269*       Diabetes Medication History and Current Regimen  Metformin: 2550mg po bid    GLP-1 Agent: pt not interested in injectable   SGLT-2 Inhibitor:  Pt never started due to cost  Glimepiride 4mg po daily      Pt has home glucometer and proper testing technique - yes    Pt reports blood sugars:     Other times: pt does not regularly test FSBG 269 in the office    Hypoglycemia awareness - yes  Nocturnal hypoglycemia- none   Hypoglycemia:  None    Pt's treatment of Hypoglycemia - n/a  - 15:15 Rule    Current Exercise - pt walks her dog  Exercise Goal - pt would benefit from daily dedicated walking 60 minutes daily    Dietary - common adult    Pt reports eating: pt refuses to report  Breakfast -    Snack -    Lunch -    Snack -    Dinner -    Snack -      Foot Exam:  Monofilament exam - current  Monofilament testing with a 10 gram force: sensation intact: decreased bilaterally.    Visual Inspection: Feet without maceration, ulcers, fissures.  Feet dry.  Pedal pulses: intact bilaterally    Preventative Management  BP regimen (ACE/ARB) - lisinopril 40mg po daily  ASA - 81mg po daily  Statin - [id number] po daily  Last Retinal Scan - pt knows she needs eye exam  Last Foot Exam - current  Last A1c -   Lab Results   Component Value Date/Time    HBA1C 9.7 (A) 12/08/2021 12:20 PM      Last Microalbuminuria - curren     updated caregaps    Past Medical History:  Patient Active Problem List    Diagnosis  Date Noted   • Type 2 diabetes mellitus without complication, with long-term current use of insulin (HCC) 11/19/2019   • Dyslipidemia 11/19/2019   • Neuropathy 01/17/2019   • Vitamin D deficiency 01/17/2019   • Obesity (BMI 30-39.9) 07/17/2018   • Benign essential HTN 05/21/2018       Past Surgical History:  Past Surgical History:   Procedure Laterality Date   • LUMBAR FUSION POSTERIOR  1/11/2010    Performed by LYNNETTE SMILEY at SURGERY [facility]   • LAMINOTOMY  1/11/2010    Performed by LYNNETTE SMILEY at SURGERY [facility]   • LUMBAR LAMINECTOMY DISKECTOMY  8/31/2009   • OTHER ORTHOPEDIC SURGERY  1989    tibia has a tanna knee to ankle, fibula is resected   • PRIMARY C SECTION  1988   • OTHER  1975    left lumpectomt in breast - benign   • OTHER         Allergies:  Percocet [oxycodone-acetaminophen], Demerol, and Statins [hmg-coa-r inhibitors]    Social History:  Social History     Socioeconomic History   • Marital status:      Spouse name: Not on file   • Number of children: Not on file   • Years of education: Not on file   • Highest education level: Not on file   Occupational History   • Not on file   Tobacco Use   • Smoking status: Never Smoker   • Smokeless tobacco: Never Used   Vaping Use   • Vaping Use: Never used   Substance and Sexual Activity   • Alcohol use: No   • Drug use: No   • Sexual activity: Yes     Partners: Male     Comment:    Other Topics Concern   • Not on file   Social History Narrative    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      Social Determinants of Health     Financial Resource Strain:    • Difficulty of Paying Living Expenses: Not on file   Food Insecurity:    • Worried About Running Out of Food in the Last Year: Not on file   • Ran Out of Food in the Last Year: Not on file   Transportation Needs:    • Lack of Transportation (Medical): Not on file   • Lack of Transportation (Non-Medical): Not on file   Physical Activity:    • Days of Exercise per Week: Not on  file   • Minutes of Exercise per Session: Not on file   Stress:    • Feeling of Stress : Not on file   Social Connections:    • Frequency of Communication with Friends and Family: Not on file   • Frequency of Social Gatherings with Friends and Family: Not on file   • Attends [employer] Services: Not on file   • Active Member of Clubs or Organizations: Not on file   • Attends Club or Organization Meetings: Not on file   • Marital Status: Not on file   Intimate Partner Violence:    • Fear of Current or Ex-Partner: Not on file   • Emotionally Abused: Not on file   • Physically Abused: Not on file   • Sexually Abused: Not on file   Housing Stability:    • Unable to Pay for Housing in the Last Year: Not on file   • Number of Places Lived in the Last Year: Not on file   • Unstable Housing in the Last Year: Not on file       Family History:  Family History   Adopted: Yes       Medications:    Current Outpatient Medications:   •  glimepiride (AMARYL) 4 MG Tab, TAKE 1 TABLET TWICE A DAY, Disp: 180 Tablet, Rfl: 1  •  metFORMIN (GLUCOPHAGE) 850 MG Tab, Take 1 Tablet by mouth 2 times a day with meals., Disp: 180 Tablet, Rfl: 1  •  lisinopril (PRINIVIL) 40 MG tablet, Take 1 Tablet by mouth every day for 180 days., Disp: 90 Tablet, Rfl: 1  •  atenolol (TENORMIN) 50 MG Tab, Take 1 Tablet by mouth every day for 180 days., Disp: 90 Tablet, Rfl: 1  •  gabapentin (NEURONTIN) 300 MG Cap, TAKE 1 CAPSULE AT BEDTIME, Disp: 90 Capsule, Rfl: 1  •  amLODIPine (NORVASC) 10 MG Tab, Take 1 Tablet by mouth every day for 180 days., Disp: 90 Tablet, Rfl: 1  •  pravastatin (PRAVACHOL) 10 MG Tab, Take 1 Tablet by mouth every day for 180 days., Disp: 90 Tablet, Rfl: 1  •  vitamin D (CHOLECALCIFEROL) 1000 UNIT Tab, Take 1,000 Units by mouth every day., Disp: , Rfl:   •  aspirin EC (ECOTRIN) 81 MG Tablet Delayed Response, Take 81 mg by mouth every day., Disp: , Rfl:   •  glucose blood strip, 1 Each by Other route as needed., Disp: , Rfl:   •  STOOL  SOFTENER PO, Take 1 Tab by mouth every day., Disp: , Rfl:   •  Empagliflozin (JARDIANCE) 10 MG Tab, Take 1 tablet by mouth every day. (Patient not taking: Reported on 10/13/2021), Disp: 30 tablet, Rfl: 1    Labs: Reviewed    Physical Examination:  Vital signs: There were no vitals taken for this visit. There is no height or weight on file to calculate BMI.    Assessment and Plan:    1. DM2  • A1c up from 8.7 to 9.7  • Will reduce metformin as her daily dose exceeds max 2g/day  • Will begin Jardiance 25mg po daily - pt never started when it was originally prescribed, as she chose not to afford it likely due to the annual medicare deductible of $480  • Pt prefers to defer on GLP-1  • Pt is currently on glipizide.  • Pt is a retired RN who, refuses to weigh, and has no interest in lifestyle changes, currently eating a common adult diet rich in simple carbohydrates including cookies, cakes, potatoes, chips. Etc.   • FSBG 269 in the office, she will not tell me what she had for breakfast this morning.    - Medication changes:  • Decrease metformin to 750mg po bid  • Begin Jardiance 25mg po daily      - Lifestyle changes:  • Pt would benefit from daily dedicated walking 30-60 minutes daily  • Pt would benefit from a low simple carbohydrate diet    No follow-ups on file.    Madeleine Mi  12/08/21    CC:   KIERA Holland

## 2022-01-13 ENCOUNTER — OFFICE VISIT (OUTPATIENT)
Dept: MEDICAL GROUP | Facility: PHYSICIAN GROUP | Age: 77
End: 2022-01-13
Payer: MEDICARE

## 2022-01-13 VITALS
OXYGEN SATURATION: 97 % | HEIGHT: 62 IN | DIASTOLIC BLOOD PRESSURE: 82 MMHG | SYSTOLIC BLOOD PRESSURE: 118 MMHG | HEART RATE: 71 BPM | TEMPERATURE: 97.4 F | BODY MASS INDEX: 27.25 KG/M2 | RESPIRATION RATE: 16 BRPM

## 2022-01-13 DIAGNOSIS — Z76.0 MEDICATION REFILL: ICD-10-CM

## 2022-01-13 DIAGNOSIS — Z79.4 TYPE 2 DIABETES MELLITUS WITHOUT COMPLICATION, WITH LONG-TERM CURRENT USE OF INSULIN (HCC): ICD-10-CM

## 2022-01-13 DIAGNOSIS — E11.9 TYPE 2 DIABETES MELLITUS WITHOUT COMPLICATION, WITH LONG-TERM CURRENT USE OF INSULIN (HCC): ICD-10-CM

## 2022-01-13 DIAGNOSIS — E78.5 DYSLIPIDEMIA: ICD-10-CM

## 2022-01-13 DIAGNOSIS — E78.2 MIXED HYPERLIPIDEMIA: ICD-10-CM

## 2022-01-13 DIAGNOSIS — I10 BENIGN ESSENTIAL HTN: ICD-10-CM

## 2022-01-13 PROCEDURE — 99214 OFFICE O/P EST MOD 30 MIN: CPT | Performed by: NURSE PRACTITIONER

## 2022-01-13 RX ORDER — ATENOLOL 50 MG/1
50 TABLET ORAL DAILY
Qty: 90 TABLET | Refills: 1 | Status: SHIPPED | OUTPATIENT
Start: 2022-01-13 | End: 2022-04-13

## 2022-01-13 RX ORDER — AMLODIPINE BESYLATE 10 MG/1
10 TABLET ORAL
Qty: 90 TABLET | Refills: 1 | Status: SHIPPED | OUTPATIENT
Start: 2022-01-13 | End: 2022-04-13

## 2022-01-13 RX ORDER — PRAVASTATIN SODIUM 10 MG
10 TABLET ORAL
Qty: 90 TABLET | Refills: 1 | Status: SHIPPED | OUTPATIENT
Start: 2022-01-13 | End: 2022-03-08

## 2022-01-13 RX ORDER — LISINOPRIL 40 MG/1
40 TABLET ORAL
Qty: 90 TABLET | Refills: 1 | Status: SHIPPED | OUTPATIENT
Start: 2022-01-13 | End: 2022-05-26

## 2022-01-13 RX ORDER — GLIMEPIRIDE 4 MG/1
4 TABLET ORAL 2 TIMES DAILY
Qty: 180 TABLET | Refills: 1 | Status: SHIPPED | OUTPATIENT
Start: 2022-01-13 | End: 2022-05-19

## 2022-01-13 ASSESSMENT — PATIENT HEALTH QUESTIONNAIRE - PHQ9: CLINICAL INTERPRETATION OF PHQ2 SCORE: 0

## 2022-01-13 NOTE — ASSESSMENT & PLAN NOTE
Chronic.  On metformin 500 mg 2 tabs twice daily, glimepiride 4 mg daily and Jardiance 10 mg.  Needing refills on metformin and glimepiride.  Latest A1c is 9.7 from December 2021.  Last eye exam last year.

## 2022-01-13 NOTE — PROGRESS NOTES
Chief Complaint   Patient presents with   • Diabetes       HISTORY OF PRESENT ILLNESS: Patient is a 76 y.o. female, established patient who presents today to discuss medical problems as listed below:    Health Maintenance:  COMPLETED     Benign essential HTN  Chronic problem.  On lisinopril 40 mg, amlodipine 10 mg and atenolol 50 mg.  BP today is 118/82 with a pulse of 71    Type 2 diabetes mellitus without complication, with long-term current use of insulin (HCC)  Chronic.  On metformin 500 mg 2 tabs twice daily, glimepiride 4 mg daily and Jardiance 10 mg.  Needing refills on metformin and glimepiride.  Latest A1c is 9.7 from December 2021.  Last eye exam last year.    Dyslipidemia  Chronic problem.  On pravastatin 10 mg nightly.  Needing refills today.      Patient Active Problem List    Diagnosis Date Noted   • Type 2 diabetes mellitus without complication, with long-term current use of insulin (HCC) 11/19/2019   • Dyslipidemia 11/19/2019   • Neuropathy 01/17/2019   • Vitamin D deficiency 01/17/2019   • Obesity (BMI 30-39.9) 07/17/2018   • Benign essential HTN 05/21/2018        Allergies: Percocet [oxycodone-acetaminophen], Demerol, and Statins [hmg-coa-r inhibitors]    Current Outpatient Medications   Medication Sig Dispense Refill   • lisinopril (PRINIVIL) 40 MG tablet Take 1 Tablet by mouth every day for 180 days. 90 Tablet 1   • atenolol (TENORMIN) 50 MG Tab Take 1 Tablet by mouth every day for 90 days. 90 Tablet 1   • amLODIPine (NORVASC) 10 MG Tab Take 1 Tablet by mouth every day for 90 days. 90 Tablet 1   • metFORMIN (GLUCOPHAGE) 500 MG Tab Take 2 Tablets by mouth 2 times a day with meals. 380 Tablet 1   • glimepiride (AMARYL) 4 MG Tab Take 1 Tablet by mouth 2 times a day. 180 Tablet 1   • pravastatin (PRAVACHOL) 10 MG Tab Take 1 Tablet by mouth at bedtime. 90 Tablet 1   • gabapentin (NEURONTIN) 300 MG Cap TAKE 1 CAPSULE AT BEDTIME 90 Capsule 1   • Empagliflozin (JARDIANCE) 10 MG Tab Take 1 tablet by mouth  "every day. (Patient not taking: Reported on 10/13/2021) 30 tablet 1   • vitamin D (CHOLECALCIFEROL) 1000 UNIT Tab Take 1,000 Units by mouth every day.     • aspirin EC (ECOTRIN) 81 MG Tablet Delayed Response Take 81 mg by mouth every day.     • glucose blood strip 1 Each by Other route as needed.     • STOOL SOFTENER PO Take 1 Tab by mouth every day.       No current facility-administered medications for this visit.       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    Review of Systems:     - Constitutional: Negative for fever, chills, unexpected weight change, and fatigue/generalized weakness.     - Respiratory: Negative for cough, sputum production, chest congestion, dyspnea, wheezing, and crackles.      - Cardiovascular: Negative for chest pain, palpitations, orthopnea, and bilateral lower extremity edema.    - Psychiatric/Behavioral: Negative for depression, suicidal/homicidal ideation and memory loss.      All other systems reviewed and are negative    Exam:    /82 (BP Location: Left arm, Patient Position: Sitting, BP Cuff Size: Adult)   Pulse 71   Temp 36.3 °C (97.4 °F) (Temporal)   Resp 16   Ht 1.575 m (5' 2\")   SpO2 97%   Breastfeeding No   BMI 27.25 kg/m²  Body mass index is 27.25 kg/m².    Physical Exam:  Constitutional: Well-developed and well-nourished. Not diaphoretic. No distress.   Cardiovascular: Regular rate and rhythm, S1 and S2 without murmur, rubs, or gallops.    Chest: Effort normal. Clear to auscultation throughout. No adventitious sounds.   Neurological: Alert and oriented x 3.   Psychiatric:  " Behavior, mood, and affect are appropriate.  MA/nursing note and vitals reviewed.    LABS: 7/2021  results reviewed and discussed with the patient, questions answered.    Assessment/Plan:  1. Benign essential HTN  Stable on current regimen.  Continue.  Refills given  - lisinopril (PRINIVIL) 40 MG tablet; Take 1 Tablet by mouth every day for 180 days.  Dispense: 90 Tablet; Refill: 1  - atenolol (TENORMIN) 50 MG Tab; Take 1 Tablet by mouth every day for 90 days.  Dispense: 90 Tablet; Refill: 1  - amLODIPine (NORVASC) 10 MG Tab; Take 1 Tablet by mouth every day for 90 days.  Dispense: 90 Tablet; Refill: 1  - Comp Metabolic Panel; Future    2. Mixed hyperlipidemia  Stable on current regimen.  Continue.  Refills given  - amLODIPine (NORVASC) 10 MG Tab; Take 1 Tablet by mouth every day for 90 days.  Dispense: 90 Tablet; Refill: 1  - pravastatin (PRAVACHOL) 10 MG Tab; Take 1 Tablet by mouth at bedtime.  Dispense: 90 Tablet; Refill: 1  - Comp Metabolic Panel; Future  - Lipid Profile; Future    3. Medication refill  - lisinopril (PRINIVIL) 40 MG tablet; Take 1 Tablet by mouth every day for 180 days.  Dispense: 90 Tablet; Refill: 1  - atenolol (TENORMIN) 50 MG Tab; Take 1 Tablet by mouth every day for 90 days.  Dispense: 90 Tablet; Refill: 1  - amLODIPine (NORVASC) 10 MG Tab; Take 1 Tablet by mouth every day for 90 days.  Dispense: 90 Tablet; Refill: 1  - metFORMIN (GLUCOPHAGE) 500 MG Tab; Take 2 Tablets by mouth 2 times a day with meals.  Dispense: 380 Tablet; Refill: 1  - glimepiride (AMARYL) 4 MG Tab; Take 1 Tablet by mouth 2 times a day.  Dispense: 180 Tablet; Refill: 1  - pravastatin (PRAVACHOL) 10 MG Tab; Take 1 Tablet by mouth at bedtime.  Dispense: 90 Tablet; Refill: 1    4. Type 2 diabetes mellitus without complication, with long-term current use of insulin (HCC)  Uncontrolled.  Discussed etiology and potential risk factors.  Discussed BG goal.  We will need a thorough education in regards to DM diet as this might be  one of the main contributing causes to high sugars. Will appreciate pharmacist to help with regimen.  - metFORMIN (GLUCOPHAGE) 500 MG Tab; Take 2 Tablets by mouth 2 times a day with meals.  Dispense: 380 Tablet; Refill: 1  - glimepiride (AMARYL) 4 MG Tab; Take 1 Tablet by mouth 2 times a day.  Dispense: 180 Tablet; Refill: 1  - Referral to Ophthalmology  - Comp Metabolic Panel; Future  - HEMOGLOBIN A1C; Future  - Referral to Pharmacotherapy Service    5. Dyslipidemia  Stable on current regimen.  Continue.  Refills given  - pravastatin (PRAVACHOL) 10 MG Tab; Take 1 Tablet by mouth at bedtime.  Dispense: 90 Tablet; Refill: 1       Discussed with patient possible alternative diagnoses, pt is to take all medications as prescribed. If symptoms persist FU w/PCP, if symptoms worsen go to emergency room. If experiencing any side effects from prescribed medications reports to the office immediately or go to emergency room.  Reviewed indication, dosage, usage and potential adverse effects of prescribed medications. Reviewed risks and benefits of treatment plan. Patient verbalizes understanding of all instruction and verbally agrees to plan.    No follow-ups on file. every 3 mos

## 2022-01-13 NOTE — ASSESSMENT & PLAN NOTE
Chronic problem.  On lisinopril 40 mg, amlodipine 10 mg and atenolol 50 mg.  BP today is 118/82 with a pulse of 71

## 2022-02-03 ENCOUNTER — TELEPHONE (OUTPATIENT)
Dept: VASCULAR LAB | Facility: MEDICAL CENTER | Age: 77
End: 2022-02-03

## 2022-02-03 NOTE — TELEPHONE ENCOUNTER
Called pt regarding missed pharmacotherapy appt - rescheduled for 2/24.    Manuel Owens, IsaacD, BCACP

## 2022-02-24 ENCOUNTER — NON-PROVIDER VISIT (OUTPATIENT)
Dept: MEDICAL GROUP | Facility: PHYSICIAN GROUP | Age: 77
End: 2022-02-24
Payer: MEDICARE

## 2022-02-24 VITALS — DIASTOLIC BLOOD PRESSURE: 97 MMHG | HEART RATE: 74 BPM | SYSTOLIC BLOOD PRESSURE: 164 MMHG

## 2022-02-24 PROCEDURE — 99211 OFF/OP EST MAY X REQ PHY/QHP: CPT | Performed by: STUDENT IN AN ORGANIZED HEALTH CARE EDUCATION/TRAINING PROGRAM

## 2022-02-24 NOTE — NON-PROVIDER
Patient Consult Note    TIME IN: 8:35  TIME OUT: 9:00    Primary care physician: KIERA Holland    Reason for consult: Management of Uncontrolled Type 2 Diabetes    HPI:  Zenaida Rios is a 76 y.o. old patient who comes in today for evaluation of above stated problem.    Most Recent HbA1c:   Lab Results   Component Value Date/Time    HBA1C 9.7 (A) 12/08/2021 12:20 PM      Lab Results   Component Value Date/Time    CREATININE 0.91 07/15/2021 07:40 AM              Diabetes Medication History and Current Regimen  Metformin: 1000mg po bid       Pt has home glucometer and proper testing technique -      Pt reports blood sugars:       Other times: pt refuses to test    Hypoglycemia awareness - yes  Nocturnal hypoglycemia- none  Hypoglycemia:  None    Pt's treatment of Hypoglycemia - n/a  - 15:15 Rule    Current Exercise - none  Exercise Goal - pt would benefit from daily dedicated walking 30 minutes daily    Dietary - common adult        Foot Exam:  Monofilament exam - current  Monofilament testing with a 10 gram force: sensation intact: decreased bilaterally.    Visual Inspection: Feet without maceration, ulcers, fissures.  Feet dry.  Pedal pulses: intact bilaterally    Preventative Management  BP regimen (ACE/ARB) - lisinopril 40mg po daily  ASA - 81mg po daily  Statin - prava 10  Last Retinal Scan - over due  Last Foot Exam - current  Last A1c -   Lab Results   Component Value Date/Time    HBA1C 9.7 (A) 12/08/2021 12:20 PM      Last Microalbuminuria - current     updated caregaps    Past Medical History:  Patient Active Problem List    Diagnosis Date Noted   • Type 2 diabetes mellitus without complication, with long-term current use of insulin (HCC) 11/19/2019   • Dyslipidemia 11/19/2019   • Neuropathy 01/17/2019   • Vitamin D deficiency 01/17/2019   • Obesity (BMI 30-39.9) 07/17/2018   • Benign essential HTN 05/21/2018       Past Surgical History:  Past Surgical History:   Procedure Laterality Date    • FUSION, SPINE, LUMBAR, PLIF  1/11/2010    Performed by LYNNETTE SMILEY at SURGERY [facility]   • LAMINOTOMY  1/11/2010    Performed by LYNNETTE SMILEY at SURGERY [facility]   • LUMBAR LAMINECTOMY DISKECTOMY  8/31/2009   • OTHER ORTHOPEDIC SURGERY  1989    tibia has a tanna knee to ankle, fibula is resected   • PRIMARY C SECTION  1988   • OTHER  1975    left lumpectomt in breast - benign   • OTHER         Allergies:  Percocet [oxycodone-acetaminophen], Demerol, and Statins [hmg-coa-r inhibitors]    Social History:  Social History     Socioeconomic History   • Marital status:      Spouse name: Not on file   • Number of children: Not on file   • Years of education: Not on file   • Highest education level: Not on file   Occupational History   • Not on file   Tobacco Use   • Smoking status: Never Smoker   • Smokeless tobacco: Never Used   Vaping Use   • Vaping Use: Never used   Substance and Sexual Activity   • Alcohol use: No   • Drug use: No   • Sexual activity: Yes     Partners: Male     Comment:    Other Topics Concern   • Not on file   Social History Narrative    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[employer]      Social Determinants of Health     Financial Resource Strain: Not on file   Food Insecurity: Not on file   Transportation Needs: Not on file   Physical Activity: Not on file   Stress: Not on file   Social Connections: Not on file   Intimate Partner Violence: Not on file   Housing Stability: Not on file       Family History:  Family History   Adopted: Yes       Medications:    Current Outpatient Medications:   •  lisinopril (PRINIVIL) 40 MG tablet, Take 1 Tablet by mouth every day for 180 days., Disp: 90 Tablet, Rfl: 1  •  atenolol (TENORMIN) 50 MG Tab, Take 1 Tablet by mouth every day for 90 days., Disp: 90 Tablet, Rfl: 1  •  amLODIPine (NORVASC) 10 MG Tab, Take 1 Tablet by mouth every day for 90 days., Disp: 90 Tablet, Rfl: 1  •  metFORMIN (GLUCOPHAGE) 500 MG Tab, Take 2 Tablets by mouth 2  times a day with meals., Disp: 380 Tablet, Rfl: 1  •  glimepiride (AMARYL) 4 MG Tab, Take 1 Tablet by mouth 2 times a day., Disp: 180 Tablet, Rfl: 1  •  pravastatin (PRAVACHOL) 10 MG Tab, Take 1 Tablet by mouth at bedtime., Disp: 90 Tablet, Rfl: 1  •  gabapentin (NEURONTIN) 300 MG Cap, TAKE 1 CAPSULE AT BEDTIME, Disp: 90 Capsule, Rfl: 1  •  Empagliflozin (JARDIANCE) 10 MG Tab, Take 1 tablet by mouth every day. (Patient not taking: Reported on 10/13/2021), Disp: 30 tablet, Rfl: 1  •  vitamin D (CHOLECALCIFEROL) 1000 UNIT Tab, Take 1,000 Units by mouth every day., Disp: , Rfl:   •  aspirin EC (ECOTRIN) 81 MG Tablet Delayed Response, Take 81 mg by mouth every day., Disp: , Rfl:   •  glucose blood strip, 1 Each by Other route as needed., Disp: , Rfl:   •  STOOL SOFTENER PO, Take 1 Tab by mouth every day., Disp: , Rfl:     Labs: Reviewed    Physical Examination:  Vital signs: BP (!) 164/97   Pulse 74  There is no height or weight on file to calculate BMI.    Assessment and Plan:    1. 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    • Discussed adding a GLP-1 to her regimen, however she is refusing.  Pt does agree to start Jardiance 10mg DAILY and see what happens  • Pt is hesitant to start an injectable, would likely try Rybelsus  • BP is elevated this morning, pt reports taking her meds 30 minutes daily  •     - Medication changes:  • None - pt refusing     - Lifestyle changes:  • Pt would benefit from daily dedicated walking 30-60 minutes daiy  •     Return in about 4 weeks (around 3/24/2022).    Madeleine Mi  02/24/22    CC:   KIERA Holland

## 2022-03-07 DIAGNOSIS — E78.2 MIXED HYPERLIPIDEMIA: ICD-10-CM

## 2022-03-07 DIAGNOSIS — E78.5 DYSLIPIDEMIA: ICD-10-CM

## 2022-03-07 DIAGNOSIS — Z76.0 MEDICATION REFILL: ICD-10-CM

## 2022-03-08 RX ORDER — PRAVASTATIN SODIUM 10 MG
TABLET ORAL
Qty: 90 TABLET | Refills: 3 | Status: SHIPPED | OUTPATIENT
Start: 2022-03-08 | End: 2023-02-14 | Stop reason: SDUPTHER

## 2022-03-24 ENCOUNTER — NON-PROVIDER VISIT (OUTPATIENT)
Dept: MEDICAL GROUP | Facility: PHYSICIAN GROUP | Age: 77
End: 2022-03-24
Payer: MEDICARE

## 2022-03-24 ENCOUNTER — ANTICOAGULATION MONITORING (OUTPATIENT)
Dept: MEDICAL GROUP | Facility: PHYSICIAN GROUP | Age: 77
End: 2022-03-24

## 2022-03-24 DIAGNOSIS — E11.65 TYPE 2 DIABETES MELLITUS WITH HYPERGLYCEMIA, WITHOUT LONG-TERM CURRENT USE OF INSULIN (HCC): ICD-10-CM

## 2022-03-24 LAB
GLUCOSE BLD-MCNC: 198 MG/DL (ref 70–100)
HBA1C MFR BLD: 9 % (ref 0–5.6)
INT CON NEG: ABNORMAL
INT CON POS: ABNORMAL

## 2022-03-24 PROCEDURE — 83036 HEMOGLOBIN GLYCOSYLATED A1C: CPT | Performed by: STUDENT IN AN ORGANIZED HEALTH CARE EDUCATION/TRAINING PROGRAM

## 2022-03-24 PROCEDURE — 99402 PREV MED CNSL INDIV APPRX 30: CPT | Performed by: STUDENT IN AN ORGANIZED HEALTH CARE EDUCATION/TRAINING PROGRAM

## 2022-03-24 PROCEDURE — 99999 PR NO CHARGE: CPT | Performed by: STUDENT IN AN ORGANIZED HEALTH CARE EDUCATION/TRAINING PROGRAM

## 2022-03-24 PROCEDURE — 82962 GLUCOSE BLOOD TEST: CPT | Performed by: STUDENT IN AN ORGANIZED HEALTH CARE EDUCATION/TRAINING PROGRAM

## 2022-03-24 NOTE — NON-PROVIDER
Patient Consult Note    TIME IN: 8:30  TIME OUT: 9:00    Primary care physician: KIERA Holland    Reason for consult: Management of Uncontrolled Type 2 Diabetes    HPI:  Zenaida Rios is a 76 y.o. old patient who comes in today for evaluation of above stated problem.    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    Visual Inspection: Feet without maceration, ulcers, fissures.  Feet dry.  Pedal pulses: intact bilaterally    Preventative Management  BP regimen (ACE/ARB) - lisniopril 40mg po daily  ASA - 81mg po daily  Statin - pravastatin 10mg po daily  Last Retinal Scan - overdue  Last Foot Exam - overdue  Last A1c -   Lab Results   Component Value Date/Time    HBA1C 9.0 (A) 03/24/2022 08:57 AM      Last Microalbuminuria - overdue     updated caregaps    Past Medical History:  Patient Active Problem List    Diagnosis Date Noted   • Type 2 diabetes mellitus without complication, with long-term current use of insulin (HCC) 11/19/2019   • Dyslipidemia 11/19/2019   • Neuropathy 01/17/2019   • Vitamin D deficiency 01/17/2019   • Obesity (BMI 30-39.9) 07/17/2018   • Benign essential HTN 05/21/2018       Past Surgical History:  Past Surgical  History:   Procedure Laterality Date   • FUSION, SPINE, LUMBAR, PLIF  1/11/2010    Performed by LYNNETTE SMILEY at SURGERY [facility]   • LAMINOTOMY  1/11/2010    Performed by LYNNETTE SMILEY at SURGERY [facility]   • LUMBAR LAMINECTOMY DISKECTOMY  8/31/2009   • OTHER ORTHOPEDIC SURGERY  1989    tibia has a tanna knee to ankle, fibula is resected   • PRIMARY C SECTION  1988   • OTHER  1975    left lumpectomt in breast - benign   • OTHER         Allergies:  Percocet [oxycodone-acetaminophen], Demerol, and Statins [hmg-coa-r inhibitors]    Social History:  Social History     Socioeconomic History   • Marital status:      Spouse name: Not on file   • Number of children: Not on file   • Years of education: Not on file   • Highest education level: Not on file   Occupational History   • Not on file   Tobacco Use   • Smoking status: Never Smoker   • Smokeless tobacco: Never Used   Vaping Use   • Vaping Use: Never used   Substance and Sexual Activity   • Alcohol use: No   • Drug use: No   • Sexual activity: Yes     Partners: Male     Comment:    Other Topics Concern   • Not on file   Social History Narrative    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[employer]      Social Determinants of Health     Financial Resource Strain: Not on file   Food Insecurity: Not on file   Transportation Needs: Not on file   Physical Activity: Not on file   Stress: Not on file   Social Connections: Not on file   Intimate Partner Violence: Not on file   Housing Stability: Not on file       Family History:  Family History   Adopted: Yes       Medications:    Current Outpatient Medications:   •  pravastatin (PRAVACHOL) 10 MG Tab, TAKE 1 TABLET DAILY, Disp: 90 Tablet, Rfl: 3  •  lisinopril (PRINIVIL) 40 MG tablet, Take 1 Tablet by mouth every day for 180 days., Disp: 90 Tablet, Rfl: 1  •  atenolol (TENORMIN) 50 MG Tab, Take 1 Tablet by mouth every day for 90 days., Disp: 90 Tablet, Rfl: 1  •  amLODIPine (NORVASC) 10 MG Tab, Take 1 Tablet by  mouth every day for 90 days., Disp: 90 Tablet, Rfl: 1  •  metFORMIN (GLUCOPHAGE) 500 MG Tab, Take 2 Tablets by mouth 2 times a day with meals., Disp: 380 Tablet, Rfl: 1  •  glimepiride (AMARYL) 4 MG Tab, Take 1 Tablet by mouth 2 times a day., Disp: 180 Tablet, Rfl: 1  •  vitamin D (CHOLECALCIFEROL) 1000 UNIT Tab, Take 1,000 Units by mouth every day., Disp: , Rfl:   •  aspirin EC (ECOTRIN) 81 MG Tablet Delayed Response, Take 81 mg by mouth every day., Disp: , Rfl:   •  glucose blood strip, 1 Each by Other route as needed., Disp: , Rfl:   •  STOOL SOFTENER PO, Take 1 Tab by mouth every day., Disp: , Rfl:     Labs: Reviewed    Physical Examination:  Vital signs: There were no vitals taken for this visit. There is no height or weight on file to calculate BMI.    Assessment and Plan:    1. DM2  • FSBG 198 in the office  • A1c 9.0 down from 9.7 three months ago  • Pt refuses to weigh  • Pt not compliant with medications  • Pt refuses to report on actual diet  • Pt agreeable to start rybelsus, samples provided  •     - Medication changes:  • Will add Rybelsus 3mg po daily to drug regimen     - Lifestyle changes:  • Pt would benefit from daily dedicated walking 30-60 minutes daily  • Pt would benefit from reduced carbohydrate consumption    Return in about 4 weeks (around 4/21/2022).    Madeleine Mi  03/24/22    CC:   KIERA Holland

## 2022-04-21 ENCOUNTER — NON-PROVIDER VISIT (OUTPATIENT)
Dept: MEDICAL GROUP | Facility: PHYSICIAN GROUP | Age: 77
End: 2022-04-21
Payer: MEDICARE

## 2022-04-21 DIAGNOSIS — Z79.4 TYPE 2 DIABETES MELLITUS WITHOUT COMPLICATION, WITH LONG-TERM CURRENT USE OF INSULIN (HCC): ICD-10-CM

## 2022-04-21 DIAGNOSIS — E11.9 TYPE 2 DIABETES MELLITUS WITHOUT COMPLICATION, WITH LONG-TERM CURRENT USE OF INSULIN (HCC): ICD-10-CM

## 2022-04-21 DIAGNOSIS — E11.65 TYPE 2 DIABETES MELLITUS WITH HYPERGLYCEMIA, WITHOUT LONG-TERM CURRENT USE OF INSULIN (HCC): ICD-10-CM

## 2022-04-21 LAB — GLUCOSE BLD-MCNC: 228 MG/DL (ref 70–100)

## 2022-04-21 PROCEDURE — 99211 OFF/OP EST MAY X REQ PHY/QHP: CPT | Performed by: STUDENT IN AN ORGANIZED HEALTH CARE EDUCATION/TRAINING PROGRAM

## 2022-04-21 PROCEDURE — 99999 POCT GLUCOSE: CPT | Performed by: STUDENT IN AN ORGANIZED HEALTH CARE EDUCATION/TRAINING PROGRAM

## 2022-04-21 PROCEDURE — 82962 GLUCOSE BLOOD TEST: CPT | Performed by: STUDENT IN AN ORGANIZED HEALTH CARE EDUCATION/TRAINING PROGRAM

## 2022-04-21 RX ORDER — ORAL SEMAGLUTIDE 7 MG/1
1 TABLET ORAL
Qty: 30 TABLET | Refills: 0 | Status: SHIPPED | OUTPATIENT
Start: 2022-04-21 | End: 2022-04-27 | Stop reason: SDUPTHER

## 2022-04-21 NOTE — PROGRESS NOTES
Patient Consult Note    TIME IN: 8:06  TIME OUT: 8:36    Primary care physician: KIERA Holland    Reason for consult: Management of Uncontrolled Type 2 Diabetes    HPI:  Zenaida Rios is a 76 y.o. old patient who comes in today for evaluation of above stated problem.    Most Recent HbA1c and POCT glucose:   Lab Results   Component Value Date/Time    HBA1C 9.0 (A) 03/24/2022 08:57 AM            Most Recent Scr:  Lab Results   Component Value Date/Time    CREATININE 0.91 07/15/2021 07:40 AM        Diabetes Medication History and Current Regimen  Metformin: IR/ER 1000mg po bid   GLP-1 Agent: rybelsus 3 mg     Other: glimiperide 4mg po daily      Pt has home glucometer and proper testing technique - yes    Pt reports blood sugars:       Other times: 120-170    Hypoglycemia awareness - yes  Nocturnal hypoglycemia- none  Hypoglycemia:  None    Pt's treatment of Hypoglycemia - n/a  - 15:15 Rule    Current Exercise - not a lot, 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    Visual Inspection: Feet without maceration, ulcers, fissures.  Feet dry.  Pedal pulses: intact bilaterally    Preventative Management  BP regimen (ACE/ARB) - lisinopril 40mg po daily  ASA - 81mg po daily  Statin - pravastatin 10mg po daily  Last Retinal Scan - overdue  Last Foot Exam - today  Last A1c -   Lab Results   Component Value Date/Time    HBA1C 9.0 (A) 03/24/2022 08:57 AM      Last Microalbuminuria - current     updated caregaps    Past Medical History:  Patient Active Problem List    Diagnosis Date Noted   • Type 2 diabetes mellitus without complication, with long-term current use of insulin (HCC) 11/19/2019   • Dyslipidemia 11/19/2019   • Neuropathy 01/17/2019   • Vitamin D deficiency  01/17/2019   • Obesity (BMI 30-39.9) 07/17/2018   • Benign essential HTN 05/21/2018       Past Surgical History:  Past Surgical History:   Procedure Laterality Date   • FUSION, SPINE, LUMBAR, PLIF  1/11/2010    Performed by LYNNETTE SMILEY at SURGERY [facility]   • LAMINOTOMY  1/11/2010    Performed by LYNNETTE SMILEY at SURGERY [facility]   • LUMBAR LAMINECTOMY DISKECTOMY  8/31/2009   • OTHER ORTHOPEDIC SURGERY  1989    tibia has a tanna knee to ankle, fibula is resected   • PRIMARY C SECTION  1988   • OTHER  1975    left lumpectomt in breast - benign   • OTHER         Allergies:  Percocet [oxycodone-acetaminophen], Demerol, and Statins [hmg-coa-r inhibitors]    Social History:  Social History     Socioeconomic History   • Marital status:      Spouse name: Not on file   • Number of children: Not on file   • Years of education: Not on file   • Highest education level: Not on file   Occupational History   • Not on file   Tobacco Use   • Smoking status: Never Smoker   • Smokeless tobacco: Never Used   Vaping Use   • Vaping Use: Never used   Substance and Sexual Activity   • Alcohol use: No   • Drug use: No   • Sexual activity: Yes     Partners: Male     Comment:    Other Topics Concern   • Not on file   Social History Narrative    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      Social Determinants of Health     Financial Resource Strain: Not on file   Food Insecurity: Not on file   Transportation Needs: Not on file   Physical Activity: Not on file   Stress: Not on file   Social Connections: Not on file   Intimate Partner Violence: Not on file   Housing Stability: Not on file       Family History:  Family History   Adopted: Yes       Medications:    Current Outpatient Medications:   •  Semaglutide (RYBELSUS) 7 MG Tab, Take 1 Tablet by mouth every 7 days., Disp: 30 Tablet, Rfl: 0  •  pravastatin (PRAVACHOL) 10 MG Tab, TAKE 1 TABLET DAILY, Disp: 90 Tablet, Rfl: 3  •  lisinopril (PRINIVIL) 40 MG tablet, Take  1 Tablet by mouth every day for 180 days., Disp: 90 Tablet, Rfl: 1  •  metFORMIN (GLUCOPHAGE) 500 MG Tab, Take 2 Tablets by mouth 2 times a day with meals., Disp: 380 Tablet, Rfl: 1  •  glimepiride (AMARYL) 4 MG Tab, Take 1 Tablet by mouth 2 times a day., Disp: 180 Tablet, Rfl: 1  •  vitamin D (CHOLECALCIFEROL) 1000 UNIT Tab, Take 1,000 Units by mouth every day., Disp: , Rfl:   •  aspirin EC (ECOTRIN) 81 MG Tablet Delayed Response, Take 81 mg by mouth every day., Disp: , Rfl:   •  glucose blood strip, 1 Each by Other route as needed., Disp: , Rfl:   •  STOOL SOFTENER PO, Take 1 Tab by mouth every day., Disp: , Rfl:     Labs: Reviewed    Physical Examination:  Vital signs: There were no vitals taken for this visit. There is no height or weight on file to calculate BMI.    Assessment and Plan:    1. DM2  • PT REFUSES TO WEIGH  • Pt is currently tolerating Rybelsus 3mg, will further optimize to 7mg. (pt requested I send to mail order)  • Pt reports feeling better on this medication  • Pt is optimized on metformin 1000m gpo bid  • DC glimepiride     - Medication changes:  • DC glimepiride  • Rybelsus 7mg     - Lifestyle changes:  • Pt would benefit from a low simple carbohydrate diet  • Pt would benefit from daily dedicated walking 60min daily    Follow Up:  4 weeks    Madeleine Mi    CC:   KIERA Holland

## 2022-04-27 DIAGNOSIS — E11.00 TYPE 2 DIABETES MELLITUS WITH HYPEROSMOLARITY WITHOUT COMA, WITHOUT LONG-TERM CURRENT USE OF INSULIN (HCC): ICD-10-CM

## 2022-04-27 RX ORDER — ORAL SEMAGLUTIDE 7 MG/1
1 TABLET ORAL
Qty: 30 TABLET | Refills: 0 | Status: SHIPPED | OUTPATIENT
Start: 2022-04-27 | End: 2022-05-19

## 2022-04-27 RX ORDER — ORAL SEMAGLUTIDE 7 MG/1
1 TABLET ORAL
Qty: 30 TABLET | Refills: 0 | Status: SHIPPED | OUTPATIENT
Start: 2022-04-27 | End: 2022-04-27 | Stop reason: SDUPTHER

## 2022-05-19 ENCOUNTER — ANTICOAGULATION MONITORING (OUTPATIENT)
Dept: MEDICAL GROUP | Facility: PHYSICIAN GROUP | Age: 77
End: 2022-05-19

## 2022-05-19 ENCOUNTER — NON-PROVIDER VISIT (OUTPATIENT)
Dept: MEDICAL GROUP | Facility: PHYSICIAN GROUP | Age: 77
End: 2022-05-19
Payer: MEDICARE

## 2022-05-19 DIAGNOSIS — Z79.4 TYPE 2 DIABETES MELLITUS WITHOUT COMPLICATION, WITH LONG-TERM CURRENT USE OF INSULIN (HCC): ICD-10-CM

## 2022-05-19 DIAGNOSIS — E11.9 TYPE 2 DIABETES MELLITUS WITHOUT COMPLICATION, WITH LONG-TERM CURRENT USE OF INSULIN (HCC): ICD-10-CM

## 2022-05-19 LAB — GLUCOSE BLD-MCNC: 164 MG/DL (ref 70–100)

## 2022-05-19 PROCEDURE — 82962 GLUCOSE BLOOD TEST: CPT | Performed by: PHYSICIAN ASSISTANT

## 2022-05-19 PROCEDURE — 99211 OFF/OP EST MAY X REQ PHY/QHP: CPT | Performed by: PHYSICIAN ASSISTANT

## 2022-05-19 PROCEDURE — 99999 POCT GLUCOSE: CPT | Performed by: PHYSICIAN ASSISTANT

## 2022-05-19 RX ORDER — EMPAGLIFLOZIN 25 MG/1
1 TABLET, FILM COATED ORAL DAILY
COMMUNITY
End: 2022-06-30 | Stop reason: SDUPTHER

## 2022-05-19 RX ORDER — ORAL SEMAGLUTIDE 3 MG/1
1 TABLET ORAL DAILY
COMMUNITY
End: 2022-06-08

## 2022-05-19 NOTE — PROGRESS NOTES
Patient Consult Note    TIME IN: 8:47  TIME OUT: 9:18    Primary care physician: KIERA Holland    Reason for consult: Management of Uncontrolled Type 2 Diabetes    HPI:  Zenaida Rios is a 76 y.o. old patient who comes in today for evaluation of above stated problem.    Most Recent HbA1c and POCT glucose:   Lab Results   Component Value Date/Time    HBA1C 9.0 (A) 03/24/2022 08:57 AM      Recent Labs     05/19/22  0909   POCGLUCOSE 164*       Most Recent Scr:  Lab Results   Component Value Date/Time    CREATININE 0.91 07/15/2021 07:40 AM        Diabetes Medication History and Current Regimen  Metformin: IR/ER 1000mg po bid   GLP-1 Agent: none   SGLT-2 Inhibitor: Jardiance 25mg po daily   glimiperide 4mg po daily    Pt has home glucometer and proper testing technique - yes    Pt reports blood sugars:     Other times: pt does not present with log    Hypoglycemia awareness - yes  Nocturnal hypoglycemia- none  Hypoglycemia:  None    Pt's treatment of Hypoglycemia - n/a  - 15:15 Rule    Current Exercise - none  Exercise Goal - pt would benefit from 30-60 minutes of daily dedicated walking    Dietary - common adult diet - pt is not really interested in lifestyle changes    Foot Exam:  Monofilament exam - current  Monofilament testing with a 10 gram force: sensation intact: decreased bilaterally.    Visual Inspection: Feet without maceration, ulcers, fissures.  Feet dry.  Pedal pulses: intact bilaterally    Preventative Management  BP regimen (ACE/ARB) - lisinopril 40   ASA - 81mg po daily  Statin - pravastatin 10mg po daily  Last Retinal Scan - overdue  Last Foot Exam - current  Last A1c -   Lab Results   Component Value Date/Time    HBA1C 9.0 (A) 03/24/2022 08:57 AM      Last Microalbuminuria - current     updated caregaps    Past Medical History:  Patient Active Problem List    Diagnosis Date Noted   • Type 2 diabetes mellitus without complication, with long-term current use of insulin (HCC)  11/19/2019   • Dyslipidemia 11/19/2019   • Neuropathy 01/17/2019   • Vitamin D deficiency 01/17/2019   • Obesity (BMI 30-39.9) 07/17/2018   • Benign essential HTN 05/21/2018       Past Surgical History:  Past Surgical History:   Procedure Laterality Date   • FUSION, SPINE, LUMBAR, PLIF  1/11/2010    Performed by LYNNETTE SMILEY at SURGERY [facility]   • LAMINOTOMY  1/11/2010    Performed by LYNNETTE SMILEY at SURGERY [facility]   • LUMBAR LAMINECTOMY DISKECTOMY  8/31/2009   • OTHER ORTHOPEDIC SURGERY  1989    tibia has a tanna knee to ankle, fibula is resected   • PRIMARY C SECTION  1988   • OTHER  1975    left lumpectomt in breast - benign   • OTHER         Allergies:  Percocet [oxycodone-acetaminophen], Demerol, and Statins [hmg-coa-r inhibitors]    Social History:  Social History     Socioeconomic History   • Marital status:      Spouse name: Not on file   • Number of children: Not on file   • Years of education: Not on file   • Highest education level: Not on file   Occupational History   • Not on file   Tobacco Use   • Smoking status: Never Smoker   • Smokeless tobacco: Never Used   Vaping Use   • Vaping Use: Never used   Substance and Sexual Activity   • Alcohol use: No   • Drug use: No   • Sexual activity: Yes     Partners: Male     Comment:    Other Topics Concern   • Not on file   Social History Narrative    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      Social Determinants of Health     Financial Resource Strain: Not on file   Food Insecurity: Not on file   Transportation Needs: Not on file   Physical Activity: Not on file   Stress: Not on file   Social Connections: Not on file   Intimate Partner Violence: Not on file   Housing Stability: Not on file       Family History:  Family History   Adopted: Yes       Medications:    Current Outpatient Medications:   •  Empagliflozin (JARDIANCE) 25 MG Tab, Take 1 Tablet by mouth every day., Disp: , Rfl:   •  Semaglutide (RYBELSUS) 3 MG Tab, Take 1  Tablet by mouth every day., Disp: , Rfl:   •  pravastatin (PRAVACHOL) 10 MG Tab, TAKE 1 TABLET DAILY, Disp: 90 Tablet, Rfl: 3  •  lisinopril (PRINIVIL) 40 MG tablet, Take 1 Tablet by mouth every day for 180 days., Disp: 90 Tablet, Rfl: 1  •  metFORMIN (GLUCOPHAGE) 500 MG Tab, Take 2 Tablets by mouth 2 times a day with meals., Disp: 380 Tablet, Rfl: 1  •  vitamin D (CHOLECALCIFEROL) 1000 UNIT Tab, Take 1,000 Units by mouth every day., Disp: , Rfl:   •  aspirin EC (ECOTRIN) 81 MG Tablet Delayed Response, Take 81 mg by mouth every day., Disp: , Rfl:   •  glucose blood strip, 1 Each by Other route as needed., Disp: , Rfl:   •  STOOL SOFTENER PO, Take 1 Tab by mouth every day., Disp: , Rfl:     Labs: Reviewed    Physical Examination:  Vital signs: There were no vitals taken for this visit. There is no height or weight on file to calculate BMI.    Assessment and Plan:    1. DM2  • Pt tolerated Rybelsus 3mg but was not able to afford it at the pharmacy. Will restart Rybelsus 3mg, PAP paperwork completed, samples provided  • Pt needs to provide 2021 tax return  • Pt has been taking Jardiance while she is out of rybelsus, however is not getting the results she did from the rybelsus  • FSBG 164 in the office today    - Medication changes:  • Rybelsus 3mg daily   • Continue Jardiance  • STOP glimiperide  •     - Lifestyle changes:  • Pt would benefit from increased exercise  • Pt would benefit from reduced carbohydrate intake  •     Follow Up:  2 months    Madeleine Mi    CC:   KIERA Holland

## 2022-05-20 ENCOUNTER — TELEPHONE (OUTPATIENT)
Dept: VASCULAR LAB | Facility: MEDICAL CENTER | Age: 77
End: 2022-05-20
Payer: COMMERCIAL

## 2022-05-22 DIAGNOSIS — I10 BENIGN ESSENTIAL HTN: ICD-10-CM

## 2022-05-22 DIAGNOSIS — Z76.0 MEDICATION REFILL: ICD-10-CM

## 2022-05-22 DIAGNOSIS — E78.2 MIXED HYPERLIPIDEMIA: ICD-10-CM

## 2022-05-23 NOTE — TELEPHONE ENCOUNTER
Received request via: Pharmacy    Was the patient seen in the last year in this department? Yes    Does the patient have an active prescription (recently filled or refills available) for medication(s) requested? No     Follow up appt 6/1/2022

## 2022-05-26 RX ORDER — AMLODIPINE BESYLATE 10 MG/1
TABLET ORAL
Qty: 90 TABLET | Refills: 1 | Status: SHIPPED | OUTPATIENT
Start: 2022-05-26 | End: 2022-11-10

## 2022-05-26 RX ORDER — ATENOLOL 50 MG/1
TABLET ORAL
Qty: 90 TABLET | Refills: 1 | Status: SHIPPED | OUTPATIENT
Start: 2022-05-26 | End: 2022-11-10

## 2022-05-26 RX ORDER — LISINOPRIL 40 MG/1
TABLET ORAL
Qty: 90 TABLET | Refills: 1 | Status: SHIPPED | OUTPATIENT
Start: 2022-05-26 | End: 2022-11-10

## 2022-06-01 ENCOUNTER — OFFICE VISIT (OUTPATIENT)
Dept: MEDICAL GROUP | Facility: PHYSICIAN GROUP | Age: 77
End: 2022-06-01
Payer: MEDICARE

## 2022-06-01 VITALS
HEART RATE: 72 BPM | DIASTOLIC BLOOD PRESSURE: 84 MMHG | RESPIRATION RATE: 20 BRPM | HEIGHT: 62 IN | SYSTOLIC BLOOD PRESSURE: 132 MMHG | BODY MASS INDEX: 27.25 KG/M2 | TEMPERATURE: 97.1 F | OXYGEN SATURATION: 96 %

## 2022-06-01 DIAGNOSIS — Z12.11 SCREEN FOR COLON CANCER: ICD-10-CM

## 2022-06-01 DIAGNOSIS — E11.9 TYPE 2 DIABETES MELLITUS WITHOUT COMPLICATION, WITH LONG-TERM CURRENT USE OF INSULIN (HCC): ICD-10-CM

## 2022-06-01 DIAGNOSIS — I10 BENIGN ESSENTIAL HTN: ICD-10-CM

## 2022-06-01 DIAGNOSIS — E78.5 DYSLIPIDEMIA: ICD-10-CM

## 2022-06-01 DIAGNOSIS — E55.9 VITAMIN D DEFICIENCY: ICD-10-CM

## 2022-06-01 DIAGNOSIS — Z79.4 TYPE 2 DIABETES MELLITUS WITHOUT COMPLICATION, WITH LONG-TERM CURRENT USE OF INSULIN (HCC): ICD-10-CM

## 2022-06-01 DIAGNOSIS — Z76.0 MEDICATION REFILL: ICD-10-CM

## 2022-06-01 PROCEDURE — G0439 PPPS, SUBSEQ VISIT: HCPCS | Performed by: NURSE PRACTITIONER

## 2022-06-01 ASSESSMENT — ENCOUNTER SYMPTOMS: GENERAL WELL-BEING: GOOD

## 2022-06-01 ASSESSMENT — ACTIVITIES OF DAILY LIVING (ADL): BATHING_REQUIRES_ASSISTANCE: 0

## 2022-06-01 ASSESSMENT — PATIENT HEALTH QUESTIONNAIRE - PHQ9: CLINICAL INTERPRETATION OF PHQ2 SCORE: 0

## 2022-06-01 NOTE — PROGRESS NOTES
Chief Complaint   Patient presents with   • Medication Refill   • Annual Wellness Visit         HPI:  Zenaida SCOTT is a 76 y.o. here for Medicare Annual Wellness Visit      Patient Active Problem List    Diagnosis Date Noted   • Type 2 diabetes mellitus without complication, with long-term current use of insulin (HCC) 11/19/2019   • Dyslipidemia 11/19/2019   • Neuropathy 01/17/2019   • Vitamin D deficiency 01/17/2019   • Obesity (BMI 30-39.9) 07/17/2018   • Benign essential HTN 05/21/2018       Current Outpatient Medications   Medication Sig Dispense Refill   • atenolol (TENORMIN) 50 MG Tab TAKE 1 TABLET DAILY 90 Tablet 1   • amLODIPine (NORVASC) 10 MG Tab TAKE 1 TABLET DAILY 90 Tablet 1   • lisinopril (PRINIVIL) 40 MG tablet TAKE 1 TABLET DAILY 90 Tablet 1   • Empagliflozin (JARDIANCE) 25 MG Tab Take 1 Tablet by mouth every day.     • Semaglutide (RYBELSUS) 3 MG Tab Take 1 Tablet by mouth every day.     • pravastatin (PRAVACHOL) 10 MG Tab TAKE 1 TABLET DAILY 90 Tablet 3   • metFORMIN (GLUCOPHAGE) 500 MG Tab Take 2 Tablets by mouth 2 times a day with meals. 380 Tablet 1   • vitamin D (CHOLECALCIFEROL) 1000 UNIT Tab Take 1,000 Units by mouth every day.     • aspirin EC (ECOTRIN) 81 MG Tablet Delayed Response Take 81 mg by mouth every day.     • glucose blood strip 1 Each by Other route as needed.     • STOOL SOFTENER PO Take 1 Tab by mouth every day.       No current facility-administered medications for this visit.        Patient is taking medications as noted in medication list.  Current supplements as per medication list.     Allergies: Percocet [oxycodone-acetaminophen], Demerol, and Statins [hmg-coa-r inhibitors]    Current social contact/activities: pt lives with family, son and his family.  She is active she is looking for [employer] job.  Patient used to be a nurse.    Is patient current with immunizations? Yes.    She  reports that she has never smoked. She has never used smokeless tobacco. She reports that she  does not drink alcohol and does not use drugs.  Counseling given: Not Answered        DPA/Advanced directive: Patient does not have an Advanced Directive.  A packet and workshop information was given on Advanced Directives.    ROS:    Gait: Uses no assistive device no  Ostomy: No   Other tubes: No   Amputations: No   Chronic oxygen use No   Last eye exam has an appt tomorrow   Wears hearing aids: No   : Denies any urinary leakage during the last 6 months    Screening:    Depression Screening  Little interest or pleasure in doing things?  0 - not at all  Feeling down, depressed, or hopeless? 0 - not at all  Patient Health Questionnaire Score: 0    If depressive symptoms identified deferred to follow up visit unless specifically addressed in assessment and plan.    Interpretation of PHQ-9 Total Score   Score Severity   1-4 No Depression   5-9 Mild Depression   10-14 Moderate Depression   15-19 Moderately Severe Depression   20-27 Severe Depression    Screening for Cognitive Impairment  Three Minute Recall (daughter, heaven, mountain)  3/3    Gerhard clock face with all 12 numbers and set the hands to show 10 past 11.  Yes    If cognitive concerns identified, deferred for follow up unless specifically addressed in assessment and plan.    Fall Risk Assessment  Has the patient had two or more falls in the last year or any fall with injury in the last year?  No  If fall risk identified, deferred for follow up unless specifically addressed in assessment and plan.    Safety Assessment  Throw rugs on floor.  Yes  Handrails on all stairs.  Yes  Good lighting in all hallways.  Yes  Difficulty hearing.  Yes  Patient counseled about all safety risks that were identified.    Functional Assessment ADLs  Are there any barriers preventing you from cooking for yourself or meeting nutritional needs?  No.    Are there any barriers preventing you from driving safely or obtaining transportation?  No.    Are there any barriers preventing  you from using a telephone or calling for help?  No.    Are there any barriers preventing you from shopping?  No.    Are there any barriers preventing you from taking care of your own finances?  No.    Are there any barriers preventing you from managing your medications?  No.    Are there any barriers preventing you from showering, bathing or dressing yourself?  No.    Are you currently engaging in any exercise or physical activity?  Yes.  A lot of walking   What is your perception of your health?  Good.    Health Maintenance Summary          Overdue - Annual Wellness Visit (Once) Overdue - never done    No completion history exists for this topic.          Ordered - MAMMOGRAM (Every 2 Years) Ordered on 7/20/2021    No completion history exists for this topic.          Overdue - COLORECTAL CANCER SCREENING (COLONOSCOPY - Every 10 Years) Overdue - never done    No completion history exists for this topic.          Overdue - IMM ZOSTER VACCINES (1 of 2) Overdue - never done    No completion history exists for this topic.          Ordered - BONE DENSITY (Every 5 Years) Ordered on 7/20/2021    No completion history exists for this topic.          Overdue - RETINAL SCREENING (Yearly) Overdue since 5/22/2019 05/22/2018  REFERRAL FOR RETINAL SCREENING EXAM          Postponed - COVID-19 Vaccine (1) Postponed until 7/20/2022    No completion history exists for this topic.          Ordered - FASTING LIPID PROFILE (Yearly) Ordered on 1/13/2022    07/15/2021  Lipid Profile    07/16/2020  Lipid Profile    07/15/2019  Lipid Profile    07/09/2018  LIPID PROFILE          Ordered - SERUM CREATININE (Yearly) Ordered on 1/13/2022    07/15/2021  Comp Metabolic Panel    07/16/2020  Comp Metabolic Panel    11/19/2019  Basic Metabolic Panel    07/15/2019  Comp Metabolic Panel    07/09/2018  COMP METABOLIC PANEL    Only the first 5 history entries have been loaded, but more history exists.          URINE ACR / MICROALBUMIN (Yearly)  Next due on 2021  MICROALBUMIN CREAT RATIO URINE    07/15/2019  MICROALBUMIN CREAT RATIO URINE          A1C SCREENING (Every 6 Months) Tentatively due on 2022  POCT Hemoglobin A1c    2021  POCT Hemoglobin A1c    2021  POCT Hemoglobin A1C    2020  POCT Hemoglobin A1C    2020  POCT Hemoglobin A1C    Only the first 5 history entries have been loaded, but more history exists.          DIABETES MONOFILAMENT / LE EXAM (Yearly) Next due on 2022  Diabetic Monofilament LE Exam    2021  Diabetic Monofilament Lower Extremity Exam    2019  Diabetic Monofilament LE Exam    2018  Done          IMM DTaP/Tdap/Td Vaccine (2 - Td or Tdap) Next due on 10/16/2028    10/16/2018  Imm Admin: Tdap Vaccine          IMM PNEUMOCOCCAL VACCINE: 65+ Years (Series Information) Completed    2018  Imm Admin: Pneumococcal Conjugate Vaccine (Prevnar/PCV-13)    10/16/2017  Imm Admin: Pneumococcal polysaccharide vaccine (PPSV-23)          HEPATITIS C SCREENING  Completed    2020  HCV Scrn ( [id number] 1xLife)          IMM INFLUENZA (Series Information) Completed    10/13/2021  Imm Admin: Influenza Vaccine Adult HD    10/07/2020  Imm Admin: Influenza Vaccine Quad Inj (Pf)    10/16/2018  Imm Admin: Influenza Vaccine Adult HD          IMM MENINGOCOCCAL VACCINE (MCV4) (Series Information) Aged Out    No completion history exists for this topic.                Patient Care Team:  KIERA Holland as PCP - General (Family Medicine)    Social History     Tobacco Use   • Smoking status: Never Smoker   • Smokeless tobacco: Never Used   Vaping Use   • Vaping Use: Never used   Substance Use Topics   • Alcohol use: No   • Drug use: No     Family History   Adopted: Yes     She  has a past medical history of Acute left-sided low back pain with left-sided sciatica (9/3/2019), Anesthesia, Backpain, Decreased GFR (2019), Dental disorder,  "Diabetes, Healthcare maintenance (6/1/2021), Heart murmur, Hypertension, Leg weakness, bilateral (9/3/2019), Medication refill (3/20/2020), and Pain.    She has no past medical history of Angina, Arrhythmia, Arthritis, ASTHMA, Bronchitis, Cancer (HCC), CATARACT, Congestive heart failure (HCC), COPD, Dialysis, Glaucoma, Heart valve disease, Indigestion, Infectious disease, Jaundice, Myocardial infarct (HCC), Other specified symptom associated with female genital organs, Pacemaker, Personal history of venous thrombosis and embolism, Pneumonia, Psychiatric problem, Renal disorder, Rheumatic fever, Seizure (HCC), Stroke ([facility]), Unspecified disorder of thyroid, Unspecified hemorrhagic conditions, or Unspecified urinary incontinence.   Past Surgical History:   Procedure Laterality Date   • FUSION, SPINE, LUMBAR, PLIF  1/11/2010    Performed by LYNNETTE SMILEY at SURGERY [facility]   • LAMINOTOMY  1/11/2010    Performed by LYNNETTE SMILEY at SURGERY [facility]   • LUMBAR LAMINECTOMY DISKECTOMY  8/31/2009   • OTHER ORTHOPEDIC SURGERY  1989    tibia has a tanna knee to ankle, fibula is resected   • PRIMARY C SECTION  1988   • OTHER  1975    left lumpectomt in breast - benign   • OTHER             Exam:     Pulse 72   Temp 36.2 °C (97.1 °F) (Temporal)   Resp 20   Ht 1.575 m (5' 2\")   SpO2 96%  Body mass index is 27.25 kg/m².    Hearing good.    Dentition good  Alert, oriented in no acute distress.  Eye contact is good, speech goal directed, affect calm    Assessment and Plan. The following treatment and monitoring plan is recommended:    No diagnosis found.      Services suggested: No services needed at this time  Health Care Screening recommendations as per orders if indicated.  Referrals offered: PT/OT/Nutrition counseling/Behavioral Health/Smoking cessation as per orders if indicated.    Discussion today about general wellness and lifestyle habits:    · Prevent falls and reduce trip hazards; Cautioned about " securing or removing rugs.  · Have a working fire alarm and carbon monoxide detector;   · Engage in regular physical activity and social activities       Follow-up: No follow-ups on file.

## 2022-06-08 ENCOUNTER — TELEPHONE (OUTPATIENT)
Dept: MEDICAL GROUP | Facility: PHYSICIAN GROUP | Age: 77
End: 2022-06-08

## 2022-06-08 ENCOUNTER — OFFICE VISIT (OUTPATIENT)
Dept: MEDICAL GROUP | Facility: PHYSICIAN GROUP | Age: 77
End: 2022-06-08
Payer: MEDICARE

## 2022-06-08 VITALS
SYSTOLIC BLOOD PRESSURE: 138 MMHG | OXYGEN SATURATION: 98 % | TEMPERATURE: 97 F | BODY MASS INDEX: 27.25 KG/M2 | HEIGHT: 62 IN | RESPIRATION RATE: 16 BRPM | DIASTOLIC BLOOD PRESSURE: 72 MMHG | HEART RATE: 68 BPM

## 2022-06-08 DIAGNOSIS — Z79.4 TYPE 2 DIABETES MELLITUS WITHOUT COMPLICATION, WITH LONG-TERM CURRENT USE OF INSULIN (HCC): ICD-10-CM

## 2022-06-08 DIAGNOSIS — Z76.0 MEDICATION REFILL: ICD-10-CM

## 2022-06-08 DIAGNOSIS — E11.9 TYPE 2 DIABETES MELLITUS WITHOUT COMPLICATION, WITH LONG-TERM CURRENT USE OF INSULIN (HCC): ICD-10-CM

## 2022-06-08 PROCEDURE — 99213 OFFICE O/P EST LOW 20 MIN: CPT | Performed by: NURSE PRACTITIONER

## 2022-06-08 RX ORDER — GLIMEPIRIDE 4 MG/1
4 TABLET ORAL EVERY MORNING
Qty: 90 TABLET | Refills: 0 | Status: SHIPPED | OUTPATIENT
Start: 2022-06-08 | End: 2022-08-16 | Stop reason: SDUPTHER

## 2022-06-08 NOTE — ASSESSMENT & PLAN NOTE
Chronic problem.  Most recent A1c is 9 from 3/24/2022.  Seeing Madeleine pharmacist.  Medication regimen consists of metformin 1000 mg twice daily, restarting glimepiride 4 mg daily due to not continuing with Rybelsus due to cost.  Also has free samples of Jardiance 25 mg daily.  Tried Rybelsus which is expensive for her and she will not be refilling.    BG's at home well 70s in the morning 140.  No hypoglycemic episodes.  Denies neuropathy.  Patient declines retina screen in the office today.  Appointment with ophthalmologist set for the end of the month.

## 2022-06-08 NOTE — TELEPHONE ENCOUNTER
Pt left VM message that she loves rybelsus, however does not qualify for patient assistance.  She exceeds the annual income.  Sadly, pt has been laid off, and is looking for a new job.  Madeleine Mi, Clinical Pharmacist, CDE, CACP

## 2022-06-08 NOTE — PROGRESS NOTES
Chief Complaint   Patient presents with   • Medication Refill     Empagliflozin, metFORMIN        HISTORY OF PRESENT ILLNESS: Patient is a 76 y.o. female, established patient who presents today to discuss medical problems as listed below:    Health Maintenance:  COMPLETED     Type 2 diabetes mellitus without complication, with long-term current use of insulin ([facility])  Chronic problem.  Most recent A1c is 9 from 3/24/2022.  Seeing Madeleine pharmacist.  Medication regimen consists of metformin 1000 mg twice daily, restarting glimepiride 4 mg daily due to not continuing with Rybelsus due to cost.  Also has free samples of Jardiance 25 mg daily.  Tried Rybelsus which is expensive for her and she will not be refilling.    BG's at home well 70s in the morning 140.  No hypoglycemic episodes.  Denies neuropathy.  Patient declines retina screen in the office today.  Appointment with ophthalmologist set for the end of the month.      Patient Active Problem List    Diagnosis Date Noted   • Type 2 diabetes mellitus without complication, with long-term current use of insulin ([facility]) 11/19/2019   • Dyslipidemia 11/19/2019   • Neuropathy 01/17/2019   • Vitamin D deficiency 01/17/2019   • Obesity (BMI 30-39.9) 07/17/2018   • Benign essential HTN 05/21/2018        Allergies: Percocet [oxycodone-acetaminophen], Demerol, and Statins [hmg-coa-r inhibitors]    Current Outpatient Medications   Medication Sig Dispense Refill   • metFORMIN (GLUCOPHAGE) 500 MG Tab Take 2 Tablets by mouth 2 times a day with meals. 360 Tablet 0   • glimepiride (AMARYL) 4 MG Tab Take 1 Tablet by mouth every morning. 90 Tablet 0   • atenolol (TENORMIN) 50 MG Tab TAKE 1 TABLET DAILY 90 Tablet 1   • amLODIPine (NORVASC) 10 MG Tab TAKE 1 TABLET DAILY 90 Tablet 1   • lisinopril (PRINIVIL) 40 MG tablet TAKE 1 TABLET DAILY 90 Tablet 1   • Empagliflozin (JARDIANCE) 25 MG Tab Take 1 Tablet by mouth every day.     • pravastatin (PRAVACHOL) 10 MG Tab TAKE 1 TABLET DAILY 90  "Tablet 3   • vitamin D (CHOLECALCIFEROL) 1000 UNIT Tab Take 1,000 Units by mouth every day.     • glucose blood strip 1 Each by Other route as needed.     • STOOL SOFTENER PO Take 1 Tab by mouth every day.       No current facility-administered medications for this visit.       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    Review of Systems:     - Constitutional: Negative for fever, chills, unexpected weight change, and fatigue/generalized weakness.     - Respiratory: Negative for cough, sputum production, chest congestion, dyspnea, wheezing, and crackles.      - Cardiovascular: Negative for chest pain, palpitations, orthopnea, and bilateral lower extremity edema.     - Psychiatric/Behavioral: Negative for depression, suicidal/homicidal ideation and memory loss.      All other systems reviewed and are negative    Exam:    /72   Pulse 68   Temp 36.1 °C (97 °F) (Temporal)   Resp 16   Ht 1.575 m (5' 2\")   SpO2 98%   BMI 27.25 kg/m²  Body mass index is 27.25 kg/m².    Physical Exam:  Constitutional: Well-developed and well-nourished. Not diaphoretic. No distress.   Cardiovascular: Regular rate and rhythm, S1 and S2 without murmur, rubs, or gallops.    Chest: Effort normal. Clear to auscultation throughout. No adventitious sounds.   Neurological: Alert and oriented x 3.   Psychiatric:  Behavior, mood, and affect are appropriate.  MA/nursing note and vitals reviewed.    LABS: 7/2021  results reviewed and discussed with the patient, questions answered.    Assessment/Plan:  1. Medication refill  - metFORMIN (GLUCOPHAGE) 500 MG " Tab; Take 2 Tablets by mouth 2 times a day with meals.  Dispense: 360 Tablet; Refill: 0    2. Type 2 diabetes mellitus without complication, with long-term current use of insulin (HCC)  Uncontrolled, stable.  EKG refills given.  Patient is working on her lifestyle modifications.  Also seeing Madeleine in 2 weeks for follow-up.  Will obtain labs in July and follow-up with patient in August.  - metFORMIN (GLUCOPHAGE) 500 MG Tab; Take 2 Tablets by mouth 2 times a day with meals.  Dispense: 360 Tablet; Refill: 0  - glimepiride (AMARYL) 4 MG Tab; Take 1 Tablet by mouth every morning.  Dispense: 90 Tablet; Refill: 0       Discussed with patient possible alternative diagnoses, pt is to take all medications as prescribed. If symptoms persist FU w/PCP, if symptoms worsen go to emergency room. If experiencing any side effects from prescribed medications reports to the office immediately or go to emergency room.  Reviewed indication, dosage, usage and potential adverse effects of prescribed medications. Reviewed risks and benefits of treatment plan. Patient verbalizes understanding of all instruction and verbally agrees to plan.    No follow-ups on file. 3 mos

## 2022-06-30 ENCOUNTER — NON-PROVIDER VISIT (OUTPATIENT)
Dept: MEDICAL GROUP | Facility: PHYSICIAN GROUP | Age: 77
End: 2022-06-30
Payer: MEDICARE

## 2022-06-30 DIAGNOSIS — Z79.4 TYPE 2 DIABETES MELLITUS WITHOUT COMPLICATION, WITH LONG-TERM CURRENT USE OF INSULIN (HCC): ICD-10-CM

## 2022-06-30 DIAGNOSIS — E11.9 TYPE 2 DIABETES MELLITUS WITHOUT COMPLICATION, WITH LONG-TERM CURRENT USE OF INSULIN (HCC): ICD-10-CM

## 2022-06-30 LAB
GLUCOSE BLD-MCNC: 146 MG/DL (ref 70–100)
HBA1C MFR BLD: 8.3 % (ref 0–5.6)
INT CON NEG: ABNORMAL
INT CON POS: ABNORMAL

## 2022-06-30 PROCEDURE — 83036 HEMOGLOBIN GLYCOSYLATED A1C: CPT | Performed by: STUDENT IN AN ORGANIZED HEALTH CARE EDUCATION/TRAINING PROGRAM

## 2022-06-30 PROCEDURE — 99999 POCT GLUCOSE: CPT | Performed by: STUDENT IN AN ORGANIZED HEALTH CARE EDUCATION/TRAINING PROGRAM

## 2022-06-30 PROCEDURE — 82962 GLUCOSE BLOOD TEST: CPT | Performed by: STUDENT IN AN ORGANIZED HEALTH CARE EDUCATION/TRAINING PROGRAM

## 2022-06-30 PROCEDURE — 99211 OFF/OP EST MAY X REQ PHY/QHP: CPT | Performed by: STUDENT IN AN ORGANIZED HEALTH CARE EDUCATION/TRAINING PROGRAM

## 2022-06-30 RX ORDER — EMPAGLIFLOZIN 25 MG/1
1 TABLET, FILM COATED ORAL DAILY
Qty: 90 TABLET | Refills: 1 | Status: SHIPPED | OUTPATIENT
Start: 2022-06-30 | End: 2022-08-16

## 2022-06-30 NOTE — PROGRESS NOTES
Patient Consult Note    TIME IN: 0730  TIME OUT: 8:02    Primary care physician: KIERA Holland    Reason for consult: Management of Uncontrolled Type 2 Diabetes    HPI:  Zenaida Rios is a 76 y.o. old patient who comes in today for evaluation of above stated problem.    Most Recent HbA1c and POCT glucose:   Lab Results   Component Value Date/Time    HBA1C 9.0 (A) 03/24/2022 08:57 AM            Most Recent Scr:  Lab Results   Component Value Date/Time    CREATININE 0.91 07/15/2021 07:40 AM        Diabetes Medication History and Current Regimen  Metformin: IR/ER 1000 mg po bid   GLP-1 Agent: none   SGLT-2 Inhibitor: jardiance 25mg    glimiperide 4mg po daily        Pt has home glucometer and proper testing technique - yes    Pt reports blood sugars:     Other times: pt reports 140-160    Hypoglycemia awareness - yes  Nocturnal hypoglycemia- none  Hypoglycemia:  None    Pt's treatment of Hypoglycemia - n/a  - 15:15 Rule    Current Exercise - pt reports walking her creatures totaling for 60 min daily  Exercise Goal - pt would benefit from daily dedicated walking 60 minutes daily    Dietary - common adult diet      Foot Exam:  Monofilament exam - current  Monofilament testing with a 10 gram force: sensation intact: decreased bilaterally.    Visual Inspection: Feet without maceration, ulcers, fissures.  Feet dry.  Pedal pulses: intact bilaterally    Preventative Management  BP regimen (ACE/ARB) - lisinopril 0  ASA - 81 daily  Statin - pravastatin  Last Retinal Scan - overdue - reminded pt  Last Foot Exam - current  Last A1c -   Lab Results   Component Value Date/Time    HBA1C 9.0 (A) 03/24/2022 08:57 AM      Last Microalbuminuria -  Current      updated caregaps    Past Medical History:  Patient Active Problem List    Diagnosis Date Noted   • Type 2 diabetes mellitus without complication, with long-term current use of insulin (HCC) 11/19/2019   • Dyslipidemia 11/19/2019   • Neuropathy 01/17/2019   •  Vitamin D deficiency 01/17/2019   • Obesity (BMI 30-39.9) 07/17/2018   • Benign essential HTN 05/21/2018       Past Surgical History:  Past Surgical History:   Procedure Laterality Date   • FUSION, SPINE, LUMBAR, PLIF  1/11/2010    Performed by LYNNETTE SMILEY at SURGERY [facility]   • LAMINOTOMY  1/11/2010    Performed by LYNNETTE SMILEY at SURGERY [facility]   • LUMBAR LAMINECTOMY DISKECTOMY  8/31/2009   • OTHER ORTHOPEDIC SURGERY  1989    tibia has a tanna knee to ankle, fibula is resected   • PRIMARY C SECTION  1988   • OTHER  1975    left lumpectomt in breast - benign   • OTHER         Allergies:  Percocet [oxycodone-acetaminophen], Demerol, and Statins [hmg-coa-r inhibitors]    Social History:  Social History     Socioeconomic History   • Marital status:      Spouse name: Not on file   • Number of children: Not on file   • Years of education: Not on file   • Highest education level: Not on file   Occupational History   • Not on file   Tobacco Use   • Smoking status: Never Smoker   • Smokeless tobacco: Never Used   Vaping Use   • Vaping Use: Never used   Substance and Sexual Activity   • Alcohol use: No   • Drug use: No   • Sexual activity: Yes     Partners: Male     Comment:    Other Topics Concern   • Not on file   Social History Narrative    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      Social Determinants of Health     Financial Resource Strain: Not on file   Food Insecurity: Not on file   Transportation Needs: Not on file   Physical Activity: Not on file   Stress: Not on file   Social Connections: Not on file   Intimate Partner Violence: Not on file   Housing Stability: Not on file       Family History:  Family History   Adopted: Yes       Medications:    Current Outpatient Medications:   •  metFORMIN (GLUCOPHAGE) 500 MG Tab, Take 2 Tablets by mouth 2 times a day with meals., Disp: 360 Tablet, Rfl: 0  •  glimepiride (AMARYL) 4 MG Tab, Take 1 Tablet by mouth every morning., Disp: 90 Tablet,  Rfl: 0  •  atenolol (TENORMIN) 50 MG Tab, TAKE 1 TABLET DAILY, Disp: 90 Tablet, Rfl: 1  •  amLODIPine (NORVASC) 10 MG Tab, TAKE 1 TABLET DAILY, Disp: 90 Tablet, Rfl: 1  •  lisinopril (PRINIVIL) 40 MG tablet, TAKE 1 TABLET DAILY, Disp: 90 Tablet, Rfl: 1  •  Empagliflozin (JARDIANCE) 25 MG Tab, Take 1 Tablet by mouth every day., Disp: , Rfl:   •  pravastatin (PRAVACHOL) 10 MG Tab, TAKE 1 TABLET DAILY, Disp: 90 Tablet, Rfl: 3  •  vitamin D (CHOLECALCIFEROL) 1000 UNIT Tab, Take 1,000 Units by mouth every day., Disp: , Rfl:   •  glucose blood strip, 1 Each by Other route as needed., Disp: , Rfl:   •  STOOL SOFTENER PO, Take 1 Tab by mouth every day., Disp: , Rfl:     Labs: Reviewed    Physical Examination:  Vital signs: There were no vitals taken for this visit. There is no height or weight on file to calculate BMI.    Assessment and Plan:    1. DM2  • FSBG 147  • A1c has improved  from 9.0 to 8.3  • Sadly pt is unable to afford Rybelsus, pt did not qualify for PAP. Pt to consider calling [phone] for medicare assessment for medicare open enrollment in the fall  • Pt is currently optimized on Jardiance 25mg po daily  • Pt is currently optimized on Metformin 1000mg po bid  • Pt is on glimiperide    - Medication changes:  • none     - Lifestyle changes:  • Continue to reduce simple carbohydrate consumption    Follow Up:  3 months    Madeleine Mi    CC:   KIERA Holland

## 2022-08-11 LAB — HBA1C MFR BLD: 9 % (ref 0–5.6)

## 2022-08-16 ENCOUNTER — OFFICE VISIT (OUTPATIENT)
Dept: MEDICAL GROUP | Facility: PHYSICIAN GROUP | Age: 77
End: 2022-08-16
Payer: MEDICARE

## 2022-08-16 VITALS
HEART RATE: 70 BPM | TEMPERATURE: 97.2 F | RESPIRATION RATE: 16 BRPM | OXYGEN SATURATION: 97 % | HEIGHT: 62 IN | BODY MASS INDEX: 27.25 KG/M2 | DIASTOLIC BLOOD PRESSURE: 70 MMHG | SYSTOLIC BLOOD PRESSURE: 130 MMHG

## 2022-08-16 DIAGNOSIS — E11.9 TYPE 2 DIABETES MELLITUS WITHOUT COMPLICATION, WITH LONG-TERM CURRENT USE OF INSULIN (HCC): ICD-10-CM

## 2022-08-16 DIAGNOSIS — Z79.4 TYPE 2 DIABETES MELLITUS WITHOUT COMPLICATION, WITH LONG-TERM CURRENT USE OF INSULIN (HCC): ICD-10-CM

## 2022-08-16 DIAGNOSIS — Z12.31 ENCOUNTER FOR SCREENING MAMMOGRAM FOR BREAST CANCER: ICD-10-CM

## 2022-08-16 DIAGNOSIS — E78.5 DYSLIPIDEMIA: ICD-10-CM

## 2022-08-16 PROBLEM — R14.0 ABDOMINAL BLOATING: Status: RESOLVED | Noted: 2022-08-16 | Resolved: 2022-08-16

## 2022-08-16 PROBLEM — R14.0 ABDOMINAL BLOATING: Status: ACTIVE | Noted: 2022-08-16

## 2022-08-16 PROCEDURE — 99214 OFFICE O/P EST MOD 30 MIN: CPT | Performed by: NURSE PRACTITIONER

## 2022-08-16 RX ORDER — ORAL SEMAGLUTIDE 7 MG/1
TABLET ORAL
COMMUNITY
Start: 2022-05-21 | End: 2022-08-16

## 2022-08-16 RX ORDER — GLIMEPIRIDE 4 MG/1
4 TABLET ORAL 2 TIMES DAILY
Qty: 180 TABLET | Refills: 1 | Status: SHIPPED | OUTPATIENT
Start: 2022-08-16 | End: 2022-08-22 | Stop reason: SDUPTHER

## 2022-08-16 NOTE — ASSESSMENT & PLAN NOTE
Latest Reference Range & Units 6/30/22 07:55 8/11/22 00:00   Glycohemoglobin 0.0 - 5.6 % 8.3 ! 9 ! (E)   !: Data is abnormal  (E): External lab result  On Metformin 1000 mg BID and Glimepride 4 mg, wants to take Amaryl BID.  Jardiance and Rybelsus are too expensive.  Followed by DM pharmacist Madeleine, khurram 9/29.  Pt unable to provide urine sample today.

## 2022-08-16 NOTE — PROGRESS NOTES
Chief Complaint   Patient presents with    Follow-Up       HISTORY OF PRESENT ILLNESS: Patient is a 76 y.o. female, established patient who presents today to discuss medical problems as listed below:    Health Maintenance:  COMPLETED     Type 2 diabetes mellitus without complication, with long-term current use of insulin ([facility])   Latest Reference Range & Units 6/30/22 07:55 8/11/22 00:00   Glycohemoglobin 0.0 - 5.6 % 8.3 ! 9 ! (E)   !: Data is abnormal  (E): External lab result  On Metformin 1000 mg BID and Glimepride 4 mg, wants to take Amaryl BID.  Jardiance and Rybelsus are too expensive.  Followed by DM pharmacist khurram Salvador 9/29.  Pt unable to provide urine sample today.    Dyslipidemia  8/2022 total chol 198, trigs 277, HDL 37,   Uncontrolled DM.    Abdominal bloating  New problem. Abdominal bloating, urinary frequency and urgency x 3 wks, comes and goes, not improving. No blood in urine, flank pain.   Hx of mesh placement 2019 d/t prolapse uterus.       Patient Active Problem List    Diagnosis Date Noted    Type 2 diabetes mellitus without complication, with long-term current use of insulin ([facility]) 11/19/2019    Dyslipidemia 11/19/2019    Neuropathy 01/17/2019    Vitamin D deficiency 01/17/2019    Obesity (BMI 30-39.9) 07/17/2018    Benign essential HTN 05/21/2018        Allergies: Percocet [oxycodone-acetaminophen], Demerol, and Statins [hmg-coa-r inhibitors]    Current Outpatient Medications   Medication Sig Dispense Refill    glimepiride (AMARYL) 4 MG Tab Take 1 Tablet by mouth 2 times a day. 180 Tablet 1    metFORMIN (GLUCOPHAGE) 500 MG Tab Take 2 Tablets by mouth 2 times a day with meals. 360 Tablet 0    atenolol (TENORMIN) 50 MG Tab TAKE 1 TABLET DAILY 90 Tablet 1    amLODIPine (NORVASC) 10 MG Tab TAKE 1 TABLET DAILY 90 Tablet 1    lisinopril (PRINIVIL) 40 MG tablet TAKE 1 TABLET DAILY 90 Tablet 1    pravastatin (PRAVACHOL) 10 MG Tab TAKE 1 TABLET DAILY 90 Tablet 3    vitamin D (CHOLECALCIFEROL)  "1000 UNIT Tab Take 1,000 Units by mouth every day.      glucose blood strip 1 Each by Other route as needed.      STOOL SOFTENER PO Take 1 Tab by mouth every day.       No current facility-administered medications for this visit.       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    Review of Systems:     - Constitutional: Negative for fever, chills, unexpected weight change, and fatigue/generalized weakness.      - Respiratory: Negative for cough, sputum production, chest congestion, dyspnea, wheezing, and crackles.      - Cardiovascular: Negative for chest pain, palpitations, orthopnea, and bilateral lower extremity edema.       - Psychiatric/Behavioral: Negative for depression, suicidal/homicidal ideation and memory loss.      All other systems reviewed and are negative    Exam:    /70   Pulse 70   Temp 36.2 °C (97.2 °F) (Temporal)   Resp 16   Ht 1.575 m (5' 2\")   SpO2 97%   BMI 27.25 kg/m²  Body mass index is 27.25 kg/m².    Physical Exam:  Constitutional: Well-developed and well-nourished. Not diaphoretic. No distress.   Cardiovascular: Regular rate and rhythm, S1 and S2 without murmur, rubs, or gallops.    Chest: Effort normal. Clear to auscultation throughout. No adventitious sounds. Neurological: Alert and oriented x 3.   Psychiatric:  Behavior, mood, and affect are appropriate.  MA/nursing note and vitals reviewed.    LABS: 8/2022 results reviewed and discussed with the patient, questions answered.    Assessment/Plan:  1. Encounter for screening mammogram for breast cancer  - MA-SCREENING MAMMO BILAT W/TOMOSYNTHESIS W/CAD; " Future    2. Type 2 diabetes mellitus without complication, with long-term current use of insulin (HCC)  Uncontrolled. Continue Metformin, will increase Amaryl dose to BID. Reinforced DM friendly diet. Will f/ u in 3 mos. Pt to see DM pharmacist in 1 mo  - glimepiride (AMARYL) 4 MG Tab; Take 1 Tablet by mouth 2 times a day.  Dispense: 180 Tablet; Refill: 1  - Microalbumin Creat Ratio Urine (Lab Collect); Future    3. Dyslipidemia  Uncontrolled, likely d/t uncontrolled DM.  Decrease simple carb and starch intake       Discussed with patient possible alternative diagnoses, pt is to take all medications as prescribed. If symptoms persist FU w/PCP, if symptoms worsen go to emergency room. If experiencing any side effects from prescribed medications reports to the office immediately or go to emergency room.  Reviewed indication, dosage, usage and potential adverse effects of prescribed medications. Reviewed risks and benefits of treatment plan. Patient verbalizes understanding of all instruction and verbally agrees to plan.    No follow-ups on file. 3 mos

## 2022-08-16 NOTE — ASSESSMENT & PLAN NOTE
New problem. Abdominal bloating, urinary frequency and urgency x 3 wks, comes and goes, not improving. No blood in urine, flank pain.   Hx of mesh placement 2019 d/t prolapse uterus.

## 2022-08-20 DIAGNOSIS — E11.9 TYPE 2 DIABETES MELLITUS WITHOUT COMPLICATION, WITH LONG-TERM CURRENT USE OF INSULIN (HCC): ICD-10-CM

## 2022-08-20 DIAGNOSIS — Z79.4 TYPE 2 DIABETES MELLITUS WITHOUT COMPLICATION, WITH LONG-TERM CURRENT USE OF INSULIN (HCC): ICD-10-CM

## 2022-08-20 DIAGNOSIS — Z76.0 MEDICATION REFILL: ICD-10-CM

## 2022-08-22 ENCOUNTER — TELEPHONE (OUTPATIENT)
Dept: MEDICAL GROUP | Facility: PHYSICIAN GROUP | Age: 77
End: 2022-08-22
Payer: COMMERCIAL

## 2022-08-22 DIAGNOSIS — E11.9 TYPE 2 DIABETES MELLITUS WITHOUT COMPLICATION, WITH LONG-TERM CURRENT USE OF INSULIN (HCC): ICD-10-CM

## 2022-08-22 DIAGNOSIS — Z79.4 TYPE 2 DIABETES MELLITUS WITHOUT COMPLICATION, WITH LONG-TERM CURRENT USE OF INSULIN (HCC): ICD-10-CM

## 2022-08-22 NOTE — TELEPHONE ENCOUNTER
Pt left message stating that mail order pharmacy did not receive her prescriptions and she is almost out of her glimperide.   She would like a refill sent to local pharmacy so she can pick it up before she runs out.   Pt has 4 days left.

## 2022-08-23 NOTE — PROGRESS NOTES
I was texted by the on call  at 739 pm while I was on my way home from clinic indicating the patient was completely out of her bp, cholesterol and diabetes medications. She asked for the refills to be called or faxed into the [facility] mail order pharmacy. I let them know I'd be home in 30 min and would send then. She let the  know that the phone number is the way the pharmacy prefers to be called at .     The pharmacy was closed when I called and there was no way to leave a message. This will be called in tomorrow when they are open again. I lm for pt on her voicemail that we'd update her tomorrow.

## 2022-08-23 NOTE — TELEPHONE ENCOUNTER
See message from tonight when I was on call  Need to call her meds into her mail order pharmacy and also find out why they can't receive electronic orders

## 2022-08-23 NOTE — TELEPHONE ENCOUNTER
Called and spoke to pharmacy- they state the medications are in shipment and will take 5-7 days to arrive via mail

## 2022-08-24 ENCOUNTER — TELEPHONE (OUTPATIENT)
Dept: MEDICAL GROUP | Facility: PHYSICIAN GROUP | Age: 77
End: 2022-08-24
Payer: COMMERCIAL

## 2022-08-24 NOTE — TELEPHONE ENCOUNTER
Left vm message to reschedule dm followup.  I will be out of the office 9-    Madeleine Mi, Clinical Pharmacist, CDE, CACP  Managed Care Pharmacist for [facility] and [facility]

## 2022-08-25 DIAGNOSIS — Z79.4 TYPE 2 DIABETES MELLITUS WITHOUT COMPLICATION, WITH LONG-TERM CURRENT USE OF INSULIN (HCC): ICD-10-CM

## 2022-08-25 DIAGNOSIS — E11.9 TYPE 2 DIABETES MELLITUS WITHOUT COMPLICATION, WITH LONG-TERM CURRENT USE OF INSULIN (HCC): ICD-10-CM

## 2022-08-25 RX ORDER — GLIMEPIRIDE 4 MG/1
4 TABLET ORAL 2 TIMES DAILY
Qty: 180 TABLET | Refills: 1 | Status: SHIPPED | OUTPATIENT
Start: 2022-08-25 | End: 2023-02-14 | Stop reason: SDUPTHER

## 2022-08-25 RX ORDER — GLIMEPIRIDE 4 MG/1
4 TABLET ORAL 2 TIMES DAILY
Qty: 180 TABLET | Refills: 0 | Status: SHIPPED | OUTPATIENT
Start: 2022-08-25 | End: 2022-11-22

## 2022-08-25 NOTE — TELEPHONE ENCOUNTER
Received request via: Patient    Was the patient seen in the last year in this department? Yes    Does the patient have an active prescription (recently filled or refills available) for medication(s) requested? No    Patient only got half of medication. Spoke with pharmacy and they state they have an old prescription from last year and they need a new order.

## 2022-09-28 ENCOUNTER — TELEPHONE (OUTPATIENT)
Dept: VASCULAR LAB | Facility: MEDICAL CENTER | Age: 77
End: 2022-09-28
Payer: COMMERCIAL

## 2022-09-28 NOTE — TELEPHONE ENCOUNTER
[facility]    Left VM with pt explaining we need to move her appt on 9/29 as we will not have a pharmacist that day.     Parth Herrera, PharmD

## 2022-11-10 DIAGNOSIS — E78.2 MIXED HYPERLIPIDEMIA: ICD-10-CM

## 2022-11-10 DIAGNOSIS — Z76.0 MEDICATION REFILL: ICD-10-CM

## 2022-11-10 DIAGNOSIS — I10 BENIGN ESSENTIAL HTN: ICD-10-CM

## 2022-11-10 RX ORDER — ATENOLOL 50 MG/1
TABLET ORAL
Qty: 90 TABLET | Refills: 1 | Status: SHIPPED | OUTPATIENT
Start: 2022-11-10 | End: 2023-02-14 | Stop reason: SDUPTHER

## 2022-11-10 RX ORDER — AMLODIPINE BESYLATE 10 MG/1
TABLET ORAL
Qty: 90 TABLET | Refills: 1 | Status: SHIPPED | OUTPATIENT
Start: 2022-11-10 | End: 2023-02-14 | Stop reason: SDUPTHER

## 2022-11-10 RX ORDER — LISINOPRIL 40 MG/1
TABLET ORAL
Qty: 90 TABLET | Refills: 1 | Status: SHIPPED | OUTPATIENT
Start: 2022-11-10 | End: 2023-02-14 | Stop reason: SDUPTHER

## 2022-11-10 NOTE — TELEPHONE ENCOUNTER
Received request via: Pharmacy    Was the patient seen in the last year in this department? Yes; Upcoming office visit: 11/17/2022    Does the patient have an active prescription (recently filled or refills available) for medication(s) requested? No    Does the patient have residential Plus and need 100 day supply (blood pressure, diabetes and cholesterol meds only)? Patient does not have SCP

## 2022-11-17 ENCOUNTER — OFFICE VISIT (OUTPATIENT)
Dept: MEDICAL GROUP | Facility: PHYSICIAN GROUP | Age: 77
End: 2022-11-17
Payer: MEDICARE

## 2022-11-17 VITALS
BODY MASS INDEX: 27.25 KG/M2 | RESPIRATION RATE: 14 BRPM | TEMPERATURE: 97.1 F | DIASTOLIC BLOOD PRESSURE: 80 MMHG | SYSTOLIC BLOOD PRESSURE: 132 MMHG | HEIGHT: 62 IN | HEART RATE: 70 BPM | OXYGEN SATURATION: 98 %

## 2022-11-17 DIAGNOSIS — Z79.4 TYPE 2 DIABETES MELLITUS WITHOUT COMPLICATION, WITH LONG-TERM CURRENT USE OF INSULIN (HCC): ICD-10-CM

## 2022-11-17 DIAGNOSIS — E11.40 TYPE 2 DIABETES MELLITUS WITH DIABETIC NEUROPATHY, WITHOUT LONG-TERM CURRENT USE OF INSULIN (HCC): ICD-10-CM

## 2022-11-17 DIAGNOSIS — Z23 NEED FOR VACCINATION: ICD-10-CM

## 2022-11-17 DIAGNOSIS — I10 BENIGN ESSENTIAL HTN: ICD-10-CM

## 2022-11-17 DIAGNOSIS — E11.9 TYPE 2 DIABETES MELLITUS WITHOUT COMPLICATION, WITH LONG-TERM CURRENT USE OF INSULIN (HCC): ICD-10-CM

## 2022-11-17 LAB
HBA1C MFR BLD: 9.1 % (ref 0–5.6)
INT CON NEG: NEGATIVE
INT CON POS: POSITIVE

## 2022-11-17 PROCEDURE — 90662 IIV NO PRSV INCREASED AG IM: CPT | Performed by: NURSE PRACTITIONER

## 2022-11-17 PROCEDURE — 99214 OFFICE O/P EST MOD 30 MIN: CPT | Mod: 25 | Performed by: NURSE PRACTITIONER

## 2022-11-17 PROCEDURE — 83036 HEMOGLOBIN GLYCOSYLATED A1C: CPT | Performed by: NURSE PRACTITIONER

## 2022-11-17 PROCEDURE — G0008 ADMIN INFLUENZA VIRUS VAC: HCPCS | Performed by: NURSE PRACTITIONER

## 2022-11-17 NOTE — ASSESSMENT & PLAN NOTE
Chronic and stable.  Recent kidney function from August 2022 with GFR at 66.  She is on lisinopril 40 mg, atenolol 50 mg and amlodipine 10 mg.  BP today is 132/80 with a pulse of 70.

## 2022-11-17 NOTE — ASSESSMENT & PLAN NOTE
A1C today is 9.1. unchanged from previous value.  Latest A1c is 9 from August 2022.  She is on metformin max dose of 1000 mg twice daily as well as glimepiride 4 mg daily.  She was previously on Rybelsus 7 mg p.o. q. 7 days and was doing very well, BG's were under control.  Denies neuropathy. Last 6-8 mos ago.  Last GFR at 66 from 8/2022  She denies hypoglycemic episodes.

## 2022-11-17 NOTE — PROGRESS NOTES
Chief Complaint   Patient presents with    Follow-Up       HISTORY OF PRESENT ILLNESS: Patient is a 77 y.o. female, established patient who presents today to discuss medical problems as listed below:    Health Maintenance:  COMPLETED     Type 2 diabetes mellitus with diabetic neuropathy, without long-term current use of insulin ([facility])  A1C today is 9.1. unchanged from previous value.  Latest A1c is 9 from August 2022.  She is on metformin max dose of 1000 mg twice daily as well as glimepiride 4 mg daily.  She was previously on Rybelsus 7 mg p.o. q. 7 days and was doing very well, BG's were under control.  Denies neuropathy. Last 6-8 mos ago.  Last GFR at 66 from 8/2022  She denies hypoglycemic episodes.    Benign essential HTN  Chronic and stable.  Recent kidney function from August 2022 with GFR at 66.  She is on lisinopril 40 mg, atenolol 50 mg and amlodipine 10 mg.  BP today is 132/80 with a pulse of 70.    Patient Active Problem List    Diagnosis Date Noted    Dyslipidemia 11/19/2019    Type 2 diabetes mellitus with diabetic neuropathy, without long-term current use of insulin ([facility])     Neuropathy 01/17/2019    Vitamin D deficiency 01/17/2019    Obesity (BMI 30-39.9) 07/17/2018    Benign essential HTN 05/21/2018        Allergies: Percocet [oxycodone-acetaminophen], Demerol, and Statins [hmg-coa-r inhibitors]    Current Outpatient Medications   Medication Sig Dispense Refill    Semaglutide 7 MG Tab Take 1 Tablet by mouth every 7 days. 90 Tablet 1    glimepiride (AMARYL) 4 MG Tab Take 1 Tablet by mouth 2 times a day. 180 Tablet 0    metFORMIN (GLUCOPHAGE) 500 MG Tab TAKE 2 TABLETS 2 TIMES     DAILY WITH MEALS 360 Tablet 0    lisinopril (PRINIVIL) 40 MG tablet TAKE 1 TABLET DAILY 90 Tablet 1    amLODIPine (NORVASC) 10 MG Tab TAKE 1 TABLET DAILY 90 Tablet 1    atenolol (TENORMIN) 50 MG Tab TAKE 1 TABLET DAILY 90 Tablet 1    glimepiride (AMARYL) 4 MG Tab Take 1 Tablet by mouth 2 times a day. 180 Tablet 1     "pravastatin (PRAVACHOL) 10 MG Tab TAKE 1 TABLET DAILY 90 Tablet 3    vitamin D (CHOLECALCIFEROL) 1000 UNIT Tab Take 1,000 Units by mouth every day.      glucose blood strip 1 Each by Other route as needed.      STOOL SOFTENER PO Take 1 Tab by mouth every day.       No current facility-administered medications for this visit.       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    Review of Systems:     - Constitutional: Negative for fever, chills, unexpected weight change, and fatigue/generalized weakness.     - Respiratory: Negative for cough, sputum production, chest congestion, dyspnea, wheezing, and crackles.      - Cardiovascular: Negative for chest pain, palpitations, orthopnea, and bilateral lower extremity edema.       - Psychiatric/Behavioral: Negative for depression, suicidal/homicidal ideation and memory loss.      All other systems reviewed and are negative    Exam:    /80 (BP Location: Right arm, Patient Position: Sitting, BP Cuff Size: Adult)   Pulse 70   Temp 36.2 °C (97.1 °F) (Temporal)   Resp 14   Ht 1.575 m (5' 2\")   SpO2 98%   BMI 27.25 kg/m²  Body mass index is 27.25 kg/m².    Physical Exam:  Constitutional: Well-developed and well-nourished. Not diaphoretic. No distress.   Cardiovascular: Regular rate and rhythm, S1 and S2 without murmur, rubs, or gallops.    Chest: Effort normal. Clear to auscultation throughout. No adventitious sounds. Neurological: Alert and oriented x 3.   Psychiatric:  Behavior, mood, and affect are appropriate.  MA/nursing note and vitals reviewed.    LABS: 8/2022  results reviewed and discussed " with the patient, questions answered.    Assessment/Plan:  1. Need for vaccination  - Influenza Vaccine, High Dose (65+ Only)    2. Type 2 diabetes mellitus without complication, with long-term current use of insulin (HCC)  Uncontrolled, stable.  Patient is awaiting for Rybelsus to be approved with her new insurance starting January.  Prescription for Rybelsus placed.  Will appreciate additional help from pharmacist to help patient manage her diabetes.  Discussed importance of dietary control and limit the amount of simple carbohydrates.  - POCT  A1C    3. Benign essential HTN  Stable on current regimen.  Continue.  Refills given        Discussed with patient possible alternative diagnoses, patient is to take all medications as prescribed.      If symptoms persist FU w/PCP, if symptoms worsen go to emergency room.      If experiencing any side effects from prescribed medications report to the office immediately or go to emergency room.     Reviewed indication, dosage, usage and potential adverse effects of prescribed medications.      Reviewed risks and benefits of treatment plan. Patient verbalizes understanding of all instruction and verbally agrees to plan.     Discussed plan with the patient, and patient agrees to the above.      I personally reviewed prior external notes and test results pertinent to today's visit.      No follow-ups on file. Q 3 mos

## 2022-11-21 ENCOUNTER — TELEPHONE (OUTPATIENT)
Dept: VASCULAR LAB | Facility: MEDICAL CENTER | Age: 77
End: 2022-11-21

## 2022-11-22 DIAGNOSIS — E11.9 TYPE 2 DIABETES MELLITUS WITHOUT COMPLICATION, WITH LONG-TERM CURRENT USE OF INSULIN (HCC): ICD-10-CM

## 2022-11-22 DIAGNOSIS — Z79.4 TYPE 2 DIABETES MELLITUS WITHOUT COMPLICATION, WITH LONG-TERM CURRENT USE OF INSULIN (HCC): ICD-10-CM

## 2022-11-22 RX ORDER — GLIMEPIRIDE 4 MG/1
TABLET ORAL
Qty: 180 TABLET | Refills: 0 | Status: SHIPPED | OUTPATIENT
Start: 2022-11-22 | End: 2022-12-14

## 2022-11-30 DIAGNOSIS — Z76.0 MEDICATION REFILL: ICD-10-CM

## 2022-11-30 DIAGNOSIS — Z79.4 TYPE 2 DIABETES MELLITUS WITHOUT COMPLICATION, WITH LONG-TERM CURRENT USE OF INSULIN (HCC): ICD-10-CM

## 2022-11-30 DIAGNOSIS — E11.9 TYPE 2 DIABETES MELLITUS WITHOUT COMPLICATION, WITH LONG-TERM CURRENT USE OF INSULIN (HCC): ICD-10-CM

## 2022-12-14 ENCOUNTER — OFFICE VISIT (OUTPATIENT)
Dept: MEDICAL GROUP | Facility: PHYSICIAN GROUP | Age: 77
End: 2022-12-14
Payer: MEDICARE

## 2022-12-14 VITALS
HEART RATE: 79 BPM | SYSTOLIC BLOOD PRESSURE: 140 MMHG | BODY MASS INDEX: 27.25 KG/M2 | DIASTOLIC BLOOD PRESSURE: 90 MMHG | TEMPERATURE: 97.1 F | OXYGEN SATURATION: 99 % | RESPIRATION RATE: 16 BRPM | HEIGHT: 62 IN

## 2022-12-14 DIAGNOSIS — I10 BENIGN ESSENTIAL HTN: ICD-10-CM

## 2022-12-14 DIAGNOSIS — Z00.00 HEALTHCARE MAINTENANCE: ICD-10-CM

## 2022-12-14 DIAGNOSIS — G62.9 NEUROPATHY: ICD-10-CM

## 2022-12-14 DIAGNOSIS — E11.40 TYPE 2 DIABETES MELLITUS WITH DIABETIC NEUROPATHY, WITHOUT LONG-TERM CURRENT USE OF INSULIN (HCC): ICD-10-CM

## 2022-12-14 PROCEDURE — 99213 OFFICE O/P EST LOW 20 MIN: CPT | Performed by: STUDENT IN AN ORGANIZED HEALTH CARE EDUCATION/TRAINING PROGRAM

## 2022-12-14 NOTE — ASSESSMENT & PLAN NOTE
Continue current regimen of metformin 1000 twice daily, glimepiride 4 mg twice daily.  Advised to make sure she follows up with diabetic pharmacy services on 12/28/2022 as she will be able to get back on semaglutide tabs after obtaining new insurance.    Patient reports that she cannot urinate right now for a microalbumin sample, she has a lab order placed for her to get outpatient.

## 2022-12-14 NOTE — ASSESSMENT & PLAN NOTE
Patient reports its neuropathy in her right leg following a traumatic accident after surgery.  She used to take gabapentin but no longer.  Reports symptoms are are well-tolerated without medication at this point.

## 2022-12-14 NOTE — ASSESSMENT & PLAN NOTE
Chronic condition, stable, continue current regimen of atenolol 50 mg daily, lisinopril 40 mg daily, amlodipine 10 mg daily.

## 2022-12-14 NOTE — PROGRESS NOTES
HISTORY OF PRESENT ILLNESS: Zenaida SCOTT is a pleasant 77 y.o. female, established patient who presents today to discuss medical problems as listed below:    Zenaida is here to establish care    Problem   Healthcare Maintenance   Type 2 Diabetes Mellitus With Diabetic Neuropathy, Without Long-Term Current Use of Insulin ([facility])    11/17/2022: 9.1. Rybelsus was costing her too much but she will have a new insurance in the new year and will be able to get back on this medication.  She is currently taking metformin 1000 mg twice daily and glimepiride 4 mg tablets twice daily.  She has an appointment with diabetic pharmacist services on 12/28/2022.     Neuropathy   Benign Essential Htn    On amlodipine 10mg, atenolol 50mg daily, lisinopril 40mg daily.  Patient reports well-controlled at home, no episodes of hypotension dizziness          Current Outpatient Medications Ordered in Epic   Medication Sig Dispense Refill    metFORMIN (GLUCOPHAGE) 500 MG Tab TAKE 2 TABLETS 2 TIMES     DAILY WITH MEALS 360 Tablet 0    lisinopril (PRINIVIL) 40 MG tablet TAKE 1 TABLET DAILY 90 Tablet 1    amLODIPine (NORVASC) 10 MG Tab TAKE 1 TABLET DAILY 90 Tablet 1    atenolol (TENORMIN) 50 MG Tab TAKE 1 TABLET DAILY 90 Tablet 1    glimepiride (AMARYL) 4 MG Tab Take 1 Tablet by mouth 2 times a day. 180 Tablet 1    pravastatin (PRAVACHOL) 10 MG Tab TAKE 1 TABLET DAILY 90 Tablet 3    vitamin D (CHOLECALCIFEROL) 1000 UNIT Tab Take 1,000 Units by mouth every day.      glucose blood strip 1 Each by Other route as needed.      STOOL SOFTENER PO Take 1 Tab by mouth every day.      Semaglutide 7 MG Tab Take 1 Tablet by mouth every 7 days. (Patient not taking: Reported on 12/14/2022) 90 Tablet 1     No current Epic-ordered facility-administered medications on file.       Review of systems:  Per HPI    Past Medical History:   Diagnosis Date    Acute left-sided low back pain with left-sided sciatica 9/3/2019    Decreased GFR 7/18/2019    Healthcare  maintenance 6/1/2021    Leg weakness, bilateral 9/3/2019    Medication refill 3/20/2020     Past Surgical History:   Procedure Laterality Date    FUSION, SPINE, LUMBAR, PLIF  1/11/2010    Performed by LYNNETTE SMILEY at [facility]    LAMINOTOMY  1/11/2010    Performed by LYNNETTE SMILEY at [facility]    LUMBAR LAMINECTOMY DISKECTOMY  8/31/2009    OTHER ORTHOPEDIC SURGERY  1989    tibia has a tanna knee to ankle, fibula is resected    PRIMARY C SECTION  1988    OTHER  1975    left lumpectomt in breast - benign    OTHER       Social History     Tobacco Use    Smoking status: Never    Smokeless tobacco: Never   Vaping Use    Vaping Use: Never used   Substance Use Topics    Alcohol use: No    Drug use: No      Family History   Adopted: Yes     Current Outpatient Medications   Medication Sig Dispense Refill    metFORMIN (GLUCOPHAGE) 500 MG Tab TAKE 2 TABLETS 2 TIMES     DAILY WITH MEALS 360 Tablet 0    lisinopril (PRINIVIL) 40 MG tablet TAKE 1 TABLET DAILY 90 Tablet 1    amLODIPine (NORVASC) 10 MG Tab TAKE 1 TABLET DAILY 90 Tablet 1    atenolol (TENORMIN) 50 MG Tab TAKE 1 TABLET DAILY 90 Tablet 1    glimepiride (AMARYL) 4 MG Tab Take 1 Tablet by mouth 2 times a day. 180 Tablet 1    pravastatin (PRAVACHOL) 10 MG Tab TAKE 1 TABLET DAILY 90 Tablet 3    vitamin D (CHOLECALCIFEROL) 1000 UNIT Tab Take 1,000 Units by mouth every day.      glucose blood strip 1 Each by Other route as needed.      STOOL SOFTENER PO Take 1 Tab by mouth every day.      Semaglutide 7 MG Tab Take 1 Tablet by mouth every 7 days. (Patient not taking: Reported on 12/14/2022) 90 Tablet 1     No current facility-administered medications for this visit.       Allergies:  Allergies   Allergen Reactions    Percocet [Oxycodone-Acetaminophen] Swelling    Demerol     Statins [Hmg-Coa-R Inhibitors]        Allergies, past medical history, past surgical history, family history, social history reviewed and updated.    Objective:    BP (!)  "140/90   Pulse 79   Temp 36.2 °C (97.1 °F) (Temporal)   Resp 16   Ht 1.575 m (5' 2\")   SpO2 99%   BMI 27.25 kg/m²    Body mass index is 27.25 kg/m².    Physical exam:  General: Normal appearance, no acute distress, not ill-appearing  HEENT: EOM intact, conjunctiva normal limits, negative right/left eye discharge.  Sclera anicteric  Cardiovascular: Normal rate and rhythm, no murmurs  Pulmonary: No respiratory distress, no wheezing, no rales, breath sounds normal.  Abdomen: Bowel sounds normal, flat, soft.  Musculoskeletal: No edema bilaterally  Skin: Warm, dry, no lesions  Neurological: No focal deficits, normal gait  Psychiatric: Mood within normal limits    Assessment/Plan:    Problem List Items Addressed This Visit       Benign essential HTN     Chronic condition, stable, continue current regimen of atenolol 50 mg daily, lisinopril 40 mg daily, amlodipine 10 mg daily.         Neuropathy     Patient reports its neuropathy in her right leg following a traumatic accident after surgery.  She used to take gabapentin but no longer.  Reports symptoms are are well-tolerated without medication at this point.         Type 2 diabetes mellitus with diabetic neuropathy, without long-term current use of insulin (HCC)     Continue current regimen of metformin 1000 twice daily, glimepiride 4 mg twice daily.  Advised to make sure she follows up with diabetic pharmacy services on 12/28/2022 as she will be able to get back on semaglutide tabs after obtaining new insurance.    Patient reports that she cannot urinate right now for a microalbumin sample, she has a lab order placed for her to get outpatient.         Healthcare maintenance     Declines DEXA scan screening at this time would like to address it later.             Return in about 2 months (around 2/14/2023) for diabetes.   "

## 2022-12-20 ENCOUNTER — TELEPHONE (OUTPATIENT)
Dept: HEALTH INFORMATION MANAGEMENT | Facility: OTHER | Age: 77
End: 2022-12-20

## 2022-12-28 ENCOUNTER — NON-PROVIDER VISIT (OUTPATIENT)
Dept: MEDICAL GROUP | Facility: PHYSICIAN GROUP | Age: 77
End: 2022-12-28
Payer: MEDICARE

## 2022-12-28 DIAGNOSIS — E11.40 TYPE 2 DIABETES MELLITUS WITH DIABETIC NEUROPATHY, WITHOUT LONG-TERM CURRENT USE OF INSULIN (HCC): ICD-10-CM

## 2022-12-28 PROCEDURE — 99211 OFF/OP EST MAY X REQ PHY/QHP: CPT | Performed by: STUDENT IN AN ORGANIZED HEALTH CARE EDUCATION/TRAINING PROGRAM

## 2022-12-28 RX ORDER — ORAL SEMAGLUTIDE 3 MG/1
3 TABLET ORAL DAILY
Qty: 30 TABLET | Refills: 0 | Status: SHIPPED | OUTPATIENT
Start: 2022-12-28 | End: 2023-01-25

## 2022-12-28 NOTE — PROGRESS NOTES
Patient Consult Note    TIME IN: 7:36  TIME OUT: 08:06    Primary care physician: Bakari Berumen D.O.    Reason for consult: Management of Uncontrolled Type 2 Diabetes    HPI:  Zenaida Rios is a 77 y.o. old patient who comes in today for evaluation of above stated problem.    Most Recent HbA1c and POCT glucose:   Lab Results   Component Value Date/Time    HBA1C 9.1 (A) 11/17/2022 08:51 AM            Most Recent Scr:  Lab Results   Component Value Date/Time    CREATININE 0.91 07/15/2021 07:40 AM        Current Diabetes Medication Regimen  Metformin: IR/ER 1000 mg BID   GLP-1 Agent: Rybelsus    Sulfonylurea: Glimepiride 4 mg BID with meals      Previous Diabetes Medications and Reason for Discontinuation  SGLT-2 Inhibitor: Jardiance 25 mg daily - trialed but was unable to afford due to cost    Pt has home glucometer and proper testing technique - yes    Pt reports blood sugars: checking randomly  Before Breakfast:  days ago 142      Hypoglycemia awareness - yes  Nocturnal hypoglycemia- none  Hypoglycemia:  None    Pt's treatment of Hypoglycemia  - 15:15 Rule    Current Exercise - sporadic, walks her dogs  Exercise Goal - 30 minutes of moderate intensity exercise    Dietary - common adult diet      Preventative Management  BP regimen (ACE/ARB) - lisinopril 40 mg  ASA - none  Statin - none  Last Retinal Scan - up to date  Last Foot Exam - 4/2022  Last A1c -   Lab Results   Component Value Date/Time    HBA1C 9.1 (A) 11/17/2022 08:51 AM      Last Microalbuminuria - ordered, reminded patient    updated caregaps    Past Medical History:  Patient Active Problem List    Diagnosis Date Noted    Healthcare maintenance 06/01/2021    Dyslipidemia 11/19/2019    Type 2 diabetes mellitus with diabetic neuropathy, without long-term current use of insulin (HCC)     Neuropathy 01/17/2019    Vitamin D deficiency 01/17/2019    Obesity (BMI 30-39.9) 07/17/2018    Benign essential HTN 05/21/2018       Past Surgical History:  Past  Surgical History:   Procedure Laterality Date    FUSION, SPINE, LUMBAR, PLIF  1/11/2010    Performed by LYNNETTE SMILEY at SURGERY [facility]    LAMINOTOMY  1/11/2010    Performed by LYNNETTE SMILEY at SURGERY [facility]    LUMBAR LAMINECTOMY DISKECTOMY  8/31/2009    OTHER ORTHOPEDIC SURGERY  1989    tibia has a tanna knee to ankle, fibula is resected    PRIMARY C SECTION  1988    OTHER  1975    left lumpectomt in breast - benign    OTHER         Allergies:  Percocet [oxycodone-acetaminophen], Demerol, and Statins [hmg-coa-r inhibitors]    Social History:  Social History     Socioeconomic History    Marital status:      Spouse name: Not on file    Number of children: Not on file    Years of education: Not on file    Highest education level: Not on file   Occupational History    Not on file   Tobacco Use    Smoking status: Never    Smokeless tobacco: Never   Vaping Use    Vaping Use: Never used   Substance and Sexual Activity    Alcohol use: No    Drug use: No    Sexual activity: Yes     Partners: Male     Comment:    Other Topics Concern    Not on file   Social History Narrative    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 (Patient not taking: Reported on 12/14/2022), Disp: 90 Tablet, Rfl: 1    lisinopril (PRINIVIL) 40 MG tablet, TAKE 1 TABLET DAILY, Disp: 90 Tablet, Rfl: 1    amLODIPine (NORVASC) 10 MG Tab, TAKE 1 TABLET  DAILY, Disp: 90 Tablet, Rfl: 1    atenolol (TENORMIN) 50 MG Tab, TAKE 1 TABLET DAILY, Disp: 90 Tablet, Rfl: 1    glimepiride (AMARYL) 4 MG Tab, Take 1 Tablet by mouth 2 times a day., Disp: 180 Tablet, Rfl: 1    pravastatin (PRAVACHOL) 10 MG Tab, TAKE 1 TABLET DAILY, Disp: 90 Tablet, Rfl: 3    vitamin D (CHOLECALCIFEROL) 1000 UNIT Tab, Take 1,000 Units by mouth every day., Disp: , Rfl:     glucose blood strip, 1 Each by Other route as needed., Disp: , Rfl:     STOOL SOFTENER PO, Take 1 Tab by mouth every day., Disp: , Rfl:     Labs: Reviewed    Physical Examination:  Vital signs: There were no vitals taken for this visit. There is no height or weight on file to calculate BMI.    Assessment and Plan:    1. DM2  Basic physiology of DMII was explained to patient as well as microvascular/macrovascular complications. The importance of increasing physical activity to improve diabetes control was discussed with the patient. Patient was also educated on changing diet and making better choices to help control blood sugar.   Discussed Goals: FBG 80 - 130, 2hPP < 180, a1c < 7.0%   Next A1c due after 2/17/22  Patient was unable to get Rybelsus due to insurance coverage, she is changing plans for 2023. Due to patient being off of Rybelsus for several months will restart with lowest 3 mg dose and titrate as able.   May also consider Jardiance therapy if A1c above goal after initiation of Rybelsus  Patient to start checking FBG daily and bring in log to next visit    - Medication changes:  Continue Glimepiride 4 mg BID  Continue Metformin 1000 mg BID  Restart Rybelsus 3mg daily       - Lifestyle changes:  Goal of 30 minutes of exercise daily    Follow Up:  4 weeks    Rina Isbell PharmD    CC:   Bakari Berumen D.O.

## 2023-01-25 ENCOUNTER — NON-PROVIDER VISIT (OUTPATIENT)
Dept: MEDICAL GROUP | Facility: PHYSICIAN GROUP | Age: 78
End: 2023-01-25
Payer: MEDICARE

## 2023-01-25 DIAGNOSIS — E11.40 TYPE 2 DIABETES MELLITUS WITH DIABETIC NEUROPATHY, WITHOUT LONG-TERM CURRENT USE OF INSULIN (HCC): ICD-10-CM

## 2023-01-25 PROCEDURE — 99211 OFF/OP EST MAY X REQ PHY/QHP: CPT | Performed by: STUDENT IN AN ORGANIZED HEALTH CARE EDUCATION/TRAINING PROGRAM

## 2023-01-25 RX ORDER — ORAL SEMAGLUTIDE 7 MG/1
7 TABLET ORAL DAILY
Qty: 30 TABLET | Refills: 1 | Status: SHIPPED | OUTPATIENT
Start: 2023-01-25 | End: 2023-01-25

## 2023-01-25 RX ORDER — ORAL SEMAGLUTIDE 7 MG/1
7 TABLET ORAL DAILY
Qty: 30 TABLET | Refills: 1 | Status: SHIPPED | OUTPATIENT
Start: 2023-01-25 | End: 2023-02-22

## 2023-01-25 RX ORDER — EMPAGLIFLOZIN 10 MG/1
10 TABLET, FILM COATED ORAL DAILY
Qty: 30 NOT SPECIFIED | Refills: 2 | Status: SHIPPED | OUTPATIENT
Start: 2023-01-25 | End: 2023-02-22

## 2023-01-25 NOTE — PROGRESS NOTES
Patient Consult Note    TIME IN: 9:00am  TIME OUT: 9:30am    Primary care physician: Bakari Berumen D.O.    Reason for consult: Management of Uncontrolled Type 2 Diabetes    HPI:  Zenaida Rios is a 77 y.o. old patient who comes in today for evaluation of above stated problem.    Most Recent HbA1c and POCT glucose:   Lab Results   Component Value Date/Time    HBA1C 9.1 (A) 11/17/2022 08:51 AM            Most Recent Scr:  Lab Results   Component Value Date/Time    CREATININE 0.91 07/15/2021 07:40 AM        Current Diabetes Medication Regimen  Metformin: IR/ER 1000 mg BID   GLP-1 Agent: Rybelsus  3 mg daily  Sulfonylurea: Glimepiride 4 mg BID with meals      Previous Diabetes Medications and Reason for Discontinuation  SGLT-2 Inhibitor: Jardiance 25 mg daily - trialed but was unable to afford due to cost    Pt has home glucometer and proper testing technique - yes    Pt reports blood sugars: checking randomly   Before Breakfast:  191-299      Hypoglycemia awareness - yes  Nocturnal hypoglycemia- none  Hypoglycemia:  None    Pt's treatment of Hypoglycemia  - 15:15 Rule    Current Exercise - sporadic, walks her dogs (none lately due to snow)  Exercise Goal - 30 minutes of moderate intensity exercise    Dietary - common adult diet, 2 meals per day  Breakfast: skips or cereal  Lunch: sandwich  Dinner: beef vegetable soup    Preventative Management  BP regimen (ACE/ARB) - lisinopril 40 mg  ASA - none  Statin - none  Last Retinal Scan - up to date  Last Foot Exam - 4/2022  Last A1c -   Lab Results   Component Value Date/Time    HBA1C 9.1 (A) 11/17/2022 08:51 AM      Last Microalbuminuria - ordered, reminded patient    updated caregaps    Past Medical History:  Patient Active Problem List    Diagnosis Date Noted    Healthcare maintenance 06/01/2021    Dyslipidemia 11/19/2019    Type 2 diabetes mellitus with diabetic neuropathy, without long-term current use of insulin (HCC)     Neuropathy 01/17/2019    Vitamin D  deficiency 01/17/2019    Obesity (BMI 30-39.9) 07/17/2018    Benign essential HTN 05/21/2018       Past Surgical History:  Past Surgical History:   Procedure Laterality Date    FUSION, SPINE, LUMBAR, PLIF  1/11/2010    Performed by LYNNETTE SMILEY at SURGERY [facility]    LAMINOTOMY  1/11/2010    Performed by LYNNETTE SMILEY at SURGERY [facility]    LUMBAR LAMINECTOMY DISKECTOMY  8/31/2009    OTHER ORTHOPEDIC SURGERY  1989    tibia has a tanna knee to ankle, fibula is resected    PRIMARY C SECTION  1988    OTHER  1975    left lumpectomt in breast - benign    OTHER         Allergies:  Percocet [oxycodone-acetaminophen], Demerol, and Statins [hmg-coa-r inhibitors]    Social History:  Social History     Socioeconomic History    Marital status:      Spouse name: Not on file    Number of children: Not on file    Years of education: Not on file    Highest education level: Not on file   Occupational History    Not on file   Tobacco Use    Smoking status: Never    Smokeless tobacco: Never   Vaping Use    Vaping Use: Never used   Substance and Sexual Activity    Alcohol use: No    Drug use: No    Sexual activity: Yes     Partners: Male     Comment:    Other Topics Concern    Not on file   Social History Narrative    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      Social Determinants of Health     Financial Resource Strain: Not on file   Food Insecurity: Not on file   Transportation Needs: Not on file   Physical Activity: Not on file   Stress: Not on file   Social Connections: Not on file   Intimate Partner Violence: Not on file   Housing Stability: Not on file       Family History:  Family History   Adopted: Yes       Medications:    Current Outpatient Medications:     Semaglutide (RYBELSUS) 3 MG Tab, Take 3 mg by mouth every day., Disp: 30 Tablet, Rfl: 0    metFORMIN (GLUCOPHAGE) 500 MG Tab, TAKE 2 TABLETS 2 TIMES     DAILY WITH MEALS, Disp: 360 Tablet, Rfl: 0    lisinopril (PRINIVIL) 40 MG tablet, TAKE 1  TABLET DAILY, Disp: 90 Tablet, Rfl: 1    amLODIPine (NORVASC) 10 MG Tab, TAKE 1 TABLET DAILY, Disp: 90 Tablet, Rfl: 1    atenolol (TENORMIN) 50 MG Tab, TAKE 1 TABLET DAILY, Disp: 90 Tablet, Rfl: 1    glimepiride (AMARYL) 4 MG Tab, Take 1 Tablet by mouth 2 times a day., Disp: 180 Tablet, Rfl: 1    pravastatin (PRAVACHOL) 10 MG Tab, TAKE 1 TABLET DAILY, Disp: 90 Tablet, Rfl: 3    vitamin D (CHOLECALCIFEROL) 1000 UNIT Tab, Take 1,000 Units by mouth every day., Disp: , Rfl:     glucose blood strip, 1 Each by Other route as needed., Disp: , Rfl:     STOOL SOFTENER PO, Take 1 Tab by mouth every day., Disp: , Rfl:     Labs: Reviewed    Physical Examination:  Vital signs: There were no vitals taken for this visit. There is no height or weight on file to calculate BMI.    Assessment and Plan:    1. DM2   Discussed Goals: FBG 80 - 130, 2hPP < 180, a1c < 7.0%   Next A1c due after 2/17/22  FBG above goal running in the 200's w/ some reading in the 300's  Patient is tolerating Rybelsus without issue, will plan to continue to increase dose.   Patient previously states she got yeast/UTI's with Jardiance but is willing to retrial at a lower dose to help lower her blood sugars - discussed that if blood sugars are not reduced with rybelsus may need to consider pioglitazone/insulin in the future  If blood sugars reduced at next visit plan to discontinue Glimepiride as patient likely not receiving much benefit from dose    - Medication changes:  Continue Glimepiride 4 mg BID  Continue Metformin 1000 mg BID  Increase Rybelsus 7mg daily     Initiate Jardiance 10 mg daily    - Lifestyle changes:  Goal of 30 minutes of exercise daily    Follow Up:  4 weeks    Rina Isbell, PharmD    CC:   Bakari Berumen D.O.

## 2023-02-14 ENCOUNTER — OFFICE VISIT (OUTPATIENT)
Dept: MEDICAL GROUP | Facility: PHYSICIAN GROUP | Age: 78
End: 2023-02-14
Payer: MEDICARE

## 2023-02-14 VITALS
OXYGEN SATURATION: 98 % | HEART RATE: 74 BPM | SYSTOLIC BLOOD PRESSURE: 136 MMHG | BODY MASS INDEX: 27.25 KG/M2 | HEIGHT: 62 IN | TEMPERATURE: 97.4 F | DIASTOLIC BLOOD PRESSURE: 88 MMHG

## 2023-02-14 DIAGNOSIS — E78.2 MIXED HYPERLIPIDEMIA: ICD-10-CM

## 2023-02-14 DIAGNOSIS — E11.40 TYPE 2 DIABETES MELLITUS WITH DIABETIC NEUROPATHY, WITHOUT LONG-TERM CURRENT USE OF INSULIN (HCC): ICD-10-CM

## 2023-02-14 DIAGNOSIS — Z79.4 TYPE 2 DIABETES MELLITUS WITHOUT COMPLICATION, WITH LONG-TERM CURRENT USE OF INSULIN (HCC): ICD-10-CM

## 2023-02-14 DIAGNOSIS — E78.5 DYSLIPIDEMIA: ICD-10-CM

## 2023-02-14 DIAGNOSIS — Z76.0 MEDICATION REFILL: ICD-10-CM

## 2023-02-14 DIAGNOSIS — E11.9 TYPE 2 DIABETES MELLITUS WITHOUT COMPLICATION, WITH LONG-TERM CURRENT USE OF INSULIN (HCC): ICD-10-CM

## 2023-02-14 DIAGNOSIS — I10 BENIGN ESSENTIAL HTN: ICD-10-CM

## 2023-02-14 PROCEDURE — 99214 OFFICE O/P EST MOD 30 MIN: CPT | Performed by: STUDENT IN AN ORGANIZED HEALTH CARE EDUCATION/TRAINING PROGRAM

## 2023-02-14 PROCEDURE — RXMED WILLOW AMBULATORY MEDICATION CHARGE: Performed by: STUDENT IN AN ORGANIZED HEALTH CARE EDUCATION/TRAINING PROGRAM

## 2023-02-14 RX ORDER — ATENOLOL 50 MG/1
50 TABLET ORAL DAILY
Qty: 90 TABLET | Refills: 1 | Status: SHIPPED | OUTPATIENT
Start: 2023-02-14 | End: 2023-08-16 | Stop reason: SDUPTHER

## 2023-02-14 RX ORDER — LISINOPRIL 40 MG/1
40 TABLET ORAL DAILY
Qty: 90 TABLET | Refills: 1 | Status: SHIPPED | OUTPATIENT
Start: 2023-02-14 | End: 2023-05-09 | Stop reason: SDUPTHER

## 2023-02-14 RX ORDER — AMLODIPINE BESYLATE 10 MG/1
10 TABLET ORAL DAILY
Qty: 90 TABLET | Refills: 1 | Status: SHIPPED | OUTPATIENT
Start: 2023-02-14 | End: 2023-08-16 | Stop reason: SDUPTHER

## 2023-02-14 RX ORDER — GLIMEPIRIDE 4 MG/1
4 TABLET ORAL 2 TIMES DAILY
Qty: 180 TABLET | Refills: 1 | Status: SHIPPED | OUTPATIENT
Start: 2023-02-14 | End: 2023-03-22

## 2023-02-14 RX ORDER — PRAVASTATIN SODIUM 10 MG
10 TABLET ORAL DAILY
Qty: 90 TABLET | Refills: 3 | Status: SHIPPED | OUTPATIENT
Start: 2023-02-14 | End: 2023-11-16 | Stop reason: SDUPTHER

## 2023-02-14 ASSESSMENT — PATIENT HEALTH QUESTIONNAIRE - PHQ9: CLINICAL INTERPRETATION OF PHQ2 SCORE: 0

## 2023-02-14 NOTE — PROGRESS NOTES
HISTORY OF PRESENT ILLNESS: Zenaida SCOTT is a pleasant 77 y.o. female, established patient who presents today to discuss medical problems as listed below:    Problem   Type 2 Diabetes Mellitus With Diabetic Neuropathy, Without Long-Term Current Use of Insulin ([facility])    Patient last A1c 9.1 November 2022.  Currently managed by diabetic pharmacy services.  She is currently taking Jardiance 10 mg daily, Rybelsus 7 mg daily, metformin 1000 mg twice daily, glimepiride 4 mg twice daily.  Denies any hypoglycemic episodes.  Has follow-up with pharmacy next week, will have repeat A1c at that time.      Benign Essential Htn    Home pressures are around 130s and diastolic 80.     Denies dizziness, chest pain, sob.           Current Outpatient Medications Ordered in Epic   Medication Sig Dispense Refill    amLODIPine (NORVASC) 10 MG Tab Take 1 Tablet by mouth every day. 90 Tablet 1    lisinopril (PRINIVIL) 40 MG tablet Take 1 Tablet by mouth every day. 90 Tablet 1    atenolol (TENORMIN) 50 MG Tab Take 1 Tablet by mouth every day. 90 Tablet 1    Empagliflozin (JARDIANCE) 10 MG Tab Take 1 Tablet by mouth every day. 30 Not Specified 2    Semaglutide (RYBELSUS) 7 MG Tab Take 7 mg by mouth every day. 30 Tablet 1    vitamin D (CHOLECALCIFEROL) 1000 UNIT Tab Take 1,000 Units by mouth every day.      glucose blood strip 1 Each by Other route as needed.      STOOL SOFTENER PO Take 1 Tab by mouth every day.      pravastatin (PRAVACHOL) 10 MG Tab TAKE 1 TABLET DAILY 90 Tablet 3    glimepiride (AMARYL) 4 MG Tab Take 1 Tablet by mouth 2 times a day. 180 Tablet 1    metFORMIN (GLUCOPHAGE) 500 MG Tab Take 2 Tablets by mouth 2 times a day with meals. 360 Tablet 0     No current Epic-ordered facility-administered medications on file.       Review of systems:  Per HPI    Past Medical History:   Diagnosis Date    Acute left-sided low back pain with left-sided sciatica 9/3/2019    Decreased GFR 7/18/2019    Healthcare maintenance 6/1/2021    Leg  weakness, bilateral 9/3/2019    Medication refill 3/20/2020     Past Surgical History:   Procedure Laterality Date    FUSION, SPINE, LUMBAR, PLIF  1/11/2010    Performed by LYNNETTE SMILEY at SURGERY [facility]    LAMINOTOMY  1/11/2010    Performed by LYNNETTE SMILEY at SURGERY [facility]    LUMBAR LAMINECTOMY DISKECTOMY  8/31/2009    OTHER ORTHOPEDIC SURGERY  1989    tibia has a tanna knee to ankle, fibula is resected    PRIMARY C SECTION  1988    OTHER  1975    left lumpectomt in breast - benign    OTHER       Social History     Tobacco Use    Smoking status: Never    Smokeless tobacco: Never   Vaping Use    Vaping Use: Never used   Substance Use Topics    Alcohol use: No    Drug use: No      Family History   Adopted: Yes     Current Outpatient Medications   Medication Sig Dispense Refill    amLODIPine (NORVASC) 10 MG Tab Take 1 Tablet by mouth every day. 90 Tablet 1    lisinopril (PRINIVIL) 40 MG tablet Take 1 Tablet by mouth every day. 90 Tablet 1    atenolol (TENORMIN) 50 MG Tab Take 1 Tablet by mouth every day. 90 Tablet 1    Empagliflozin (JARDIANCE) 10 MG Tab Take 1 Tablet by mouth every day. 30 Not Specified 2    Semaglutide (RYBELSUS) 7 MG Tab Take 7 mg by mouth every day. 30 Tablet 1    vitamin D (CHOLECALCIFEROL) 1000 UNIT Tab Take 1,000 Units by mouth every day.      glucose blood strip 1 Each by Other route as needed.      STOOL SOFTENER PO Take 1 Tab by mouth every day.      pravastatin (PRAVACHOL) 10 MG Tab TAKE 1 TABLET DAILY 90 Tablet 3    glimepiride (AMARYL) 4 MG Tab Take 1 Tablet by mouth 2 times a day. 180 Tablet 1    metFORMIN (GLUCOPHAGE) 500 MG Tab Take 2 Tablets by mouth 2 times a day with meals. 360 Tablet 0     No current facility-administered medications for this visit.       Allergies:  Allergies   Allergen Reactions    Percocet [Oxycodone-Acetaminophen] Swelling    Demerol     Statins [Hmg-Coa-R Inhibitors]        Allergies, past medical history, past surgical history, family  "history, social history reviewed and updated.    Objective:    /88   Pulse 74   Temp 36.3 °C (97.4 °F) (Temporal)   Ht 1.575 m (5' 2\")   SpO2 98%   BMI 27.25 kg/m²    Body mass index is 27.25 kg/m².    Physical exam:  General: Normal appearance, no acute distress, not ill-appearing  HEENT: EOM intact, conjunctiva normal limits, negative right/left eye discharge.  Sclera anicteric  Cardiovascular: Normal rate and rhythm, no murmurs  Pulmonary: No respiratory distress, no wheezing, no rales, breath sounds normal.  Abdomen: Bowel sounds normal, flat, soft.  Musculoskeletal: No edema bilaterally  Skin: Warm, dry, no lesions  Neurological: No focal deficits, normal gait  Psychiatric: Mood within normal limits    Assessment/Plan:    Problem List Items Addressed This Visit       Benign essential HTN     Chronic, stable condition.  Continue amlodipine 10 mg daily, lisinopril 40 mg daily, atenolol 50 mg twice daily.         Relevant Medications    amLODIPine (NORVASC) 10 MG Tab    lisinopril (PRINIVIL) 40 MG tablet    atenolol (TENORMIN) 50 MG Tab    Type 2 diabetes mellitus with diabetic neuropathy, without long-term current use of insulin (HCC)     Continue current regimen metformin 1000 mg twice daily, Jardiance 10 mg daily, glimepiride 4 mg twice daily, Rybelsus 7 mg daily.    A1c check next week with diabetic services, I do recommend discontinuing glimepiride if within goal.    Retinal exam records requested    Chronic, stable condition          Other Visit Diagnoses       Mixed hyperlipidemia        Relevant Medications    amLODIPine (NORVASC) 10 MG Tab    lisinopril (PRINIVIL) 40 MG tablet    atenolol (TENORMIN) 50 MG Tab    Medication refill        Relevant Medications    amLODIPine (NORVASC) 10 MG Tab    lisinopril (PRINIVIL) 40 MG tablet    atenolol (TENORMIN) 50 MG Tab             Return in about 6 months (around 8/14/2023) for diabetes, blood pressure.   "

## 2023-02-14 NOTE — LETTER
[facility]  Bakari Berumen D.O.  [address]  [facility] [address]  Fax: [phone]   Authorization for Release/Disclosure of   Protected Health Information   Name: ZENAIDA SCOTT PATRICE : 1945 SSN: [id number]   Address: [address] [place] [address] Phone:    [phone] (home)    I authorize the entity listed below to release/disclose the PHI below to:   [facility]/Bakari Berumen D.O. and Bakari Berumen D.O.   Provider or Entity Name:  [facility]   Address   City, State, Zip   Phone:      Fax:     Reason for request: continuity of care   Information to be released:    [  ] LAST COLONOSCOPY,  including any PATH REPORT and follow-up  [  ] LAST FIT/COLOGUARD RESULT [  ] LAST DEXA  [  ] LAST MAMMOGRAM  [  ] LAST PAP  [  ] LAST LABS [ x ] RETINA EXAM REPORT  [  ] IMMUNIZATION RECORDS  [  ] Release all info      [  ] Check here and initial the line next to each item to release ALL health information INCLUDING  _____ Care and treatment for drug and / or alcohol abuse  _____ HIV testing, infection status, or AIDS  _____ Genetic Testing    DATES OF SERVICE OR TIME PERIOD TO BE DISCLOSED: _____________  I understand and acknowledge that:  * This Authorization may be revoked at any time by you in writing, except if your health information has already been used or disclosed.  * Your health information that will be used or disclosed as a result of you signing this authorization could be re-disclosed by the recipient. If this occurs, your re-disclosed health information may no longer be protected by State or Federal laws.  * You may refuse to sign this Authorization. Your refusal will not affect your ability to obtain treatment.  * This Authorization becomes effective upon signing and will  on (date) __________.      If no date is indicated, this Authorization will  one (1) year from the signature date.    Name: Zenaida SCOTT Patrice    Signature:   Date:      2/14/2023       PLEASE FAX REQUESTED RECORDS BACK TO: [phone]

## 2023-02-14 NOTE — ASSESSMENT & PLAN NOTE
Continue current regimen metformin 1000 mg twice daily, Jardiance 10 mg daily, glimepiride 4 mg twice daily, Rybelsus 7 mg daily.    A1c check next week with diabetic services, I do recommend discontinuing glimepiride if within goal.    Retinal exam records requested  Diabetic foot exam at next visit    Chronic, stable condition

## 2023-02-14 NOTE — ASSESSMENT & PLAN NOTE
Chronic, stable condition.  Continue amlodipine 10 mg daily, lisinopril 40 mg daily, atenolol 50 mg twice daily.

## 2023-02-15 ENCOUNTER — PHARMACY VISIT (OUTPATIENT)
Dept: PHARMACY | Facility: MEDICAL CENTER | Age: 78
End: 2023-02-15
Payer: COMMERCIAL

## 2023-02-15 PROCEDURE — RXMED WILLOW AMBULATORY MEDICATION CHARGE: Performed by: STUDENT IN AN ORGANIZED HEALTH CARE EDUCATION/TRAINING PROGRAM

## 2023-02-22 ENCOUNTER — NON-PROVIDER VISIT (OUTPATIENT)
Dept: MEDICAL GROUP | Facility: PHYSICIAN GROUP | Age: 78
End: 2023-02-22
Payer: MEDICARE

## 2023-02-22 DIAGNOSIS — Z79.4 TYPE 2 DIABETES MELLITUS WITHOUT COMPLICATION, WITH LONG-TERM CURRENT USE OF INSULIN (HCC): ICD-10-CM

## 2023-02-22 DIAGNOSIS — E11.9 TYPE 2 DIABETES MELLITUS WITHOUT COMPLICATION, WITH LONG-TERM CURRENT USE OF INSULIN (HCC): ICD-10-CM

## 2023-02-22 LAB
HBA1C MFR BLD: 9.8 % (ref ?–5.8)
POCT INT CON NEG: ABNORMAL
POCT INT CON POS: ABNORMAL

## 2023-02-22 PROCEDURE — 83036 HEMOGLOBIN GLYCOSYLATED A1C: CPT | Mod: GZ | Performed by: STUDENT IN AN ORGANIZED HEALTH CARE EDUCATION/TRAINING PROGRAM

## 2023-02-22 PROCEDURE — 99211 OFF/OP EST MAY X REQ PHY/QHP: CPT | Performed by: STUDENT IN AN ORGANIZED HEALTH CARE EDUCATION/TRAINING PROGRAM

## 2023-02-22 RX ORDER — ORAL SEMAGLUTIDE 14 MG/1
14 TABLET ORAL DAILY
Qty: 90 TABLET | Refills: 1 | Status: SHIPPED | OUTPATIENT
Start: 2023-02-22 | End: 2023-02-23 | Stop reason: SDUPTHER

## 2023-02-22 RX ORDER — EMPAGLIFLOZIN 25 MG/1
25 TABLET, FILM COATED ORAL DAILY
Qty: 90 TABLET | Refills: 1 | Status: SHIPPED | OUTPATIENT
Start: 2023-02-22 | End: 2023-02-23 | Stop reason: SDUPTHER

## 2023-02-22 NOTE — PROGRESS NOTES
Patient Consult Note    TIME IN: 9:00am  TIME OUT: 9:30am    Primary care physician: Bakari Berumen D.O.    Reason for consult: Management of Uncontrolled Type 2 Diabetes    HPI:  Zenaida Rios is a 77 y.o. old patient who comes in today for evaluation of above stated problem.    Most Recent HbA1c and POCT glucose:   Lab Results   Component Value Date/Time    HBA1C 9.1 (A) 11/17/2022 08:51 AM            Most Recent Scr:  Lab Results   Component Value Date/Time    CREATININE 0.91 07/15/2021 07:40 AM        Current Diabetes Medication Regimen  Metformin: IR/ER 1000 mg BID   GLP-1 Agent: Rybelsus  7 mg daily  Sulfonylurea: Glimepiride 4 mg BID with meals  SGLT2i: Jardiance 10 mg daily      Previous Diabetes Medications and Reason for Discontinuation  None    Pt has home glucometer and proper testing technique - yes    Pt reports blood sugars: checking randomly   Before Breakfast: 153-245      Hypoglycemia awareness - yes  Nocturnal hypoglycemia- none  Hypoglycemia:  None    Pt's treatment of Hypoglycemia  - 15:15 Rule    Current Exercise - sporadic, walks her dogs (none lately due to snow)  Exercise Goal - 30 minutes of moderate intensity exercise    Dietary - common adult diet, 2 meals per day - trying to eat more vegetables  Breakfast: skips or cereal  Lunch: sandwich  Dinner: beef vegetable soup    Preventative Management  BP regimen (ACE/ARB) - lisinopril 40 mg  ASA - none  Statin - none  Last Retinal Scan - up to date  Last Foot Exam - 4/2022  Last A1c -   Lab Results   Component Value Date/Time    HBA1C 9.1 (A) 11/17/2022 08:51 AM      Last Microalbuminuria - ordered, reminded patient    updated caregaps    Past Medical History:  Patient Active Problem List    Diagnosis Date Noted    Healthcare maintenance 06/01/2021    Dyslipidemia 11/19/2019    Type 2 diabetes mellitus with diabetic neuropathy, without long-term current use of insulin (HCC)     Neuropathy 01/17/2019    Vitamin D deficiency 01/17/2019     Obesity (BMI 30-39.9) 07/17/2018    Benign essential HTN 05/21/2018       Past Surgical History:  Past Surgical History:   Procedure Laterality Date    FUSION, SPINE, LUMBAR, PLIF  1/11/2010    Performed by LYNNETTE SMILEY at SURGERY [facility]    LAMINOTOMY  1/11/2010    Performed by LYNNETTE SMILEY at SURGERY [facility]    LUMBAR LAMINECTOMY DISKECTOMY  8/31/2009    OTHER ORTHOPEDIC SURGERY  1989    tibia has a tanna knee to ankle, fibula is resected    PRIMARY C SECTION  1988    OTHER  1975    left lumpectomt in breast - benign    OTHER         Allergies:  Percocet [oxycodone-acetaminophen], Demerol, and Statins [hmg-coa-r inhibitors]    Social History:  Social History     Socioeconomic History    Marital status:      Spouse name: Not on file    Number of children: Not on file    Years of education: Not on file    Highest education level: Not on file   Occupational History    Not on file   Tobacco Use    Smoking status: Never    Smokeless tobacco: Never   Vaping Use    Vaping Use: Never used   Substance and Sexual Activity    Alcohol use: No    Drug use: No    Sexual activity: Yes     Partners: Male     Comment:    Other Topics Concern    Not on file   Social History Narrative    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      Social Determinants of Health     Financial Resource Strain: Not on file   Food Insecurity: Not on file   Transportation Needs: Not on file   Physical Activity: Not on file   Stress: Not on file   Social Connections: Not on file   Intimate Partner Violence: Not on file   Housing Stability: Not on file       Family History:  Family History   Adopted: Yes       Medications:    Current Outpatient Medications:     amLODIPine (NORVASC) 10 MG Tab, Take 1 Tablet by mouth every day., Disp: 90 Tablet, Rfl: 1    lisinopril (PRINIVIL) 40 MG tablet, Take 1 Tablet by mouth every day., Disp: 90 Tablet, Rfl: 1    atenolol (TENORMIN) 50 MG Tab, Take 1 Tablet by mouth every day., Disp: 90  Tablet, Rfl: 1    pravastatin (PRAVACHOL) 10 MG Tab, TAKE 1 TABLET DAILY, Disp: 90 Tablet, Rfl: 3    glimepiride (AMARYL) 4 MG Tab, Take 1 Tablet by mouth 2 times a day., Disp: 180 Tablet, Rfl: 1    metFORMIN (GLUCOPHAGE) 500 MG Tab, Take 2 Tablets by mouth 2 times a day with meals., Disp: 360 Tablet, Rfl: 0    Empagliflozin (JARDIANCE) 10 MG Tab, Take 1 Tablet by mouth every day., Disp: 30 Not Specified, Rfl: 2    Semaglutide (RYBELSUS) 7 MG Tab, Take 7 mg by mouth every day., Disp: 30 Tablet, Rfl: 1    vitamin D (CHOLECALCIFEROL) 1000 UNIT Tab, Take 1,000 Units by mouth every day., Disp: , Rfl:     glucose blood strip, 1 Each by Other route as needed., Disp: , Rfl:     STOOL SOFTENER PO, Take 1 Tab by mouth every day., Disp: , Rfl:     Labs: Reviewed    Physical Examination:  Vital signs: There were no vitals taken for this visit. There is no height or weight on file to calculate BMI.    Assessment and Plan:    1. DM2   Discussed Goals: FBG 80 - 130, 2hPP < 180, a1c < 7.0%   A1c increased to 9.8%  from 9.1%  FBG above goal but improved from prior  Patient is tolerating Rybelsus without issue, will increase dose  Patient tolerating Jardiance 10 mg without issue, will increase to 25 mg to maximize benefit of medication  Patient previously unable to afford medications due to insurance but since the beginning of the year patient has SCP and is able to afford medications.  May consider d/c of glimepiride if able to control BG with other agents.     - Medication changes:  Continue Glimepiride 4 mg BID  Continue Metformin 1000 mg BID  Increase Rybelsus 14mg daily     Increase Jardiance 25 mg daily    - Lifestyle changes:  Goal of 30 minutes of exercise daily - increase exercise currently not exercising  Focus on vegetables and lean protein    Follow Up:  4 weeks    Rina Isbell, PharmD    CC:   Bakari Berumen D.O.

## 2023-02-23 DIAGNOSIS — Z79.4 TYPE 2 DIABETES MELLITUS WITHOUT COMPLICATION, WITH LONG-TERM CURRENT USE OF INSULIN (HCC): ICD-10-CM

## 2023-02-23 DIAGNOSIS — E11.9 TYPE 2 DIABETES MELLITUS WITHOUT COMPLICATION, WITH LONG-TERM CURRENT USE OF INSULIN (HCC): ICD-10-CM

## 2023-02-23 RX ORDER — EMPAGLIFLOZIN 25 MG/1
25 TABLET, FILM COATED ORAL DAILY
Qty: 90 TABLET | Refills: 1 | Status: SHIPPED | OUTPATIENT
Start: 2023-02-23 | End: 2023-04-05 | Stop reason: SDUPTHER

## 2023-02-23 RX ORDER — ORAL SEMAGLUTIDE 14 MG/1
14 TABLET ORAL DAILY
Qty: 90 TABLET | Refills: 1 | Status: SHIPPED | OUTPATIENT
Start: 2023-02-23 | End: 2023-04-05 | Stop reason: SDUPTHER

## 2023-02-23 NOTE — TELEPHONE ENCOUNTER
Received request via: Patient    Was the patient seen in the last year in this department? Yes    Does the patient have an active prescription (recently filled or refills available) for medication(s) requested?  Patient [facility]    Does the patient have long-term Plus and need 100 day supply (blood pressure, diabetes and cholesterol meds only)? Patient does not have SCP

## 2023-03-22 ENCOUNTER — NON-PROVIDER VISIT (OUTPATIENT)
Dept: MEDICAL GROUP | Facility: PHYSICIAN GROUP | Age: 78
End: 2023-03-22
Payer: MEDICARE

## 2023-03-22 PROCEDURE — 99211 OFF/OP EST MAY X REQ PHY/QHP: CPT | Performed by: STUDENT IN AN ORGANIZED HEALTH CARE EDUCATION/TRAINING PROGRAM

## 2023-03-22 NOTE — PROGRESS NOTES
Patient Consult Note    TIME IN: 8:55am  TIME OUT: 9:30am    Primary care physician: Bakari Berumen D.O.    Reason for consult: Management of Uncontrolled Type 2 Diabetes    HPI:  Zenaida Rios is a 77 y.o. old patient who comes in today for evaluation of above stated problem.    Most Recent HbA1c and POCT glucose:   Lab Results   Component Value Date/Time    HBA1C 9.8 (A) 02/22/2023 02:10 PM            Most Recent Scr:  Lab Results   Component Value Date/Time    CREATININE 0.91 07/15/2021 07:40 AM        Current Diabetes Medication Regimen  Metformin: IR/ER 1000 mg BID   GLP-1 Agent: Rybelsus  14 mg daily  Sulfonylurea: Glimepiride 4 mg BID with meals  SGLT2i: Jardiance 25 mg daily      Previous Diabetes Medications and Reason for Discontinuation  None    Pt has home glucometer and proper testing technique - yes    Pt reports blood sugars: checking randomly   Before Breakfast: 99, 124, 139, 231, 113,       Hypoglycemia awareness - yes  Nocturnal hypoglycemia- none  Hypoglycemia:  None    Pt's treatment of Hypoglycemia  - 15:15 Rule    Current Exercise - sporadic, walks her dogs, started working again 2 days per week  Exercise Goal - 30 minutes of moderate intensity exercise    Dietary - common adult diet, 2 meals per day - trying to eat more vegetables  Breakfast: skips or cereal  Lunch: sandwich  Dinner: beef vegetable soup    Preventative Management  BP regimen (ACE/ARB) - lisinopril 40 mg  ASA - none  Statin - none  Last Retinal Scan - up to date  Last Foot Exam - 4/2022  Last A1c -   Lab Results   Component Value Date/Time    HBA1C 9.8 (A) 02/22/2023 02:10 PM      Last Microalbuminuria - ordered, reminded patient    updated caregaps    Past Medical History:  Patient Active Problem List    Diagnosis Date Noted    Healthcare maintenance 06/01/2021    Dyslipidemia 11/19/2019    Type 2 diabetes mellitus with diabetic neuropathy, without long-term current use of insulin (HCC)     Neuropathy 01/17/2019     Vitamin D deficiency 01/17/2019    Obesity (BMI 30-39.9) 07/17/2018    Benign essential HTN 05/21/2018       Past Surgical History:  Past Surgical History:   Procedure Laterality Date    FUSION, SPINE, LUMBAR, PLIF  1/11/2010    Performed by LYNNETTE SMILEY at SURGERY [facility]    LAMINOTOMY  1/11/2010    Performed by LYNNETTE SMILEY at SURGERY [facility]    LUMBAR LAMINECTOMY DISKECTOMY  8/31/2009    OTHER ORTHOPEDIC SURGERY  1989    tibia has a tanna knee to ankle, fibula is resected    PRIMARY C SECTION  1988    OTHER  1975    left lumpectomt in breast - benign    OTHER         Allergies:  Percocet [oxycodone-acetaminophen], Demerol, and Statins [hmg-coa-r inhibitors]    Social History:  Social History     Socioeconomic History    Marital status:      Spouse name: Not on file    Number of children: Not on file    Years of education: Not on file    Highest education level: Not on file   Occupational History    Not on file   Tobacco Use    Smoking status: Never    Smokeless tobacco: Never   Vaping Use    Vaping Use: Never used   Substance and Sexual Activity    Alcohol use: No    Drug use: No    Sexual activity: Yes     Partners: Male     Comment:    Other Topics Concern    Not on file   Social History Narrative    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      Social Determinants of Health     Financial Resource Strain: Not on file   Food Insecurity: Not on file   Transportation Needs: Not on file   Physical Activity: Not on file   Stress: Not on file   Social Connections: Not on file   Intimate Partner Violence: Not on file   Housing Stability: Not on file       Family History:  Family History   Adopted: Yes       Medications:    Current Outpatient Medications:     Semaglutide (RYBELSUS) 14 MG Tab, Take 14 mg by mouth every day., Disp: 90 Tablet, Rfl: 1    Empagliflozin (JARDIANCE) 25 MG Tab, Take 25 mg by mouth every day., Disp: 90 Tablet, Rfl: 1    amLODIPine (NORVASC) 10 MG Tab, Take 1 Tablet  by mouth every day., Disp: 90 Tablet, Rfl: 1    lisinopril (PRINIVIL) 40 MG tablet, Take 1 Tablet by mouth every day., Disp: 90 Tablet, Rfl: 1    atenolol (TENORMIN) 50 MG Tab, Take 1 Tablet by mouth every day., Disp: 90 Tablet, Rfl: 1    pravastatin (PRAVACHOL) 10 MG Tab, TAKE 1 TABLET DAILY, Disp: 90 Tablet, Rfl: 3    glimepiride (AMARYL) 4 MG Tab, Take 1 Tablet by mouth 2 times a day., Disp: 180 Tablet, Rfl: 1    metFORMIN (GLUCOPHAGE) 500 MG Tab, Take 2 Tablets by mouth 2 times a day with meals., Disp: 360 Tablet, Rfl: 0    vitamin D (CHOLECALCIFEROL) 1000 UNIT Tab, Take 1,000 Units by mouth every day., Disp: , Rfl:     glucose blood strip, 1 Each by Other route as needed., Disp: , Rfl:     STOOL SOFTENER PO, Take 1 Tab by mouth every day., Disp: , Rfl:     Labs: Reviewed    Physical Examination:  Vital signs: There were no vitals taken for this visit. There is no height or weight on file to calculate BMI.    Assessment and Plan:    1. DM2   Discussed Goals: FBG 80 - 130, 2hPP < 180, a1c < 7.0%   FBG improved from last visit. Last 5 days with only 1 blood sugar above goal.  Next A1c due after 5/22/23  Patient is tolerating increased dose of Rybelsus and Jardiance without issue.   Patient presented with Medicare Part D prescription drug coverage denial letters for Rybelsus and Jardiance recommending patient try colesevelam - based on patients previous A1c and medications therapies. It is very unlikely that patients blood sugars will be controlled with recommended alternative regimen.   Patient instructed to fill prescriptions at pharmacy and contact clinic if coverage issues - will upload into media tab if appeal to be submitted at a later time  Will discontinue Glimepiride, likely medication is no longer providing benefit    - Medication changes:  Discontinue Glimepiride 4 mg BID  Continue Metformin 1000 mg BID  Continue Rybelsus 14mg daily     Continue Jardiance 25 mg daily    - Lifestyle changes:  Goal of 30  minutes of exercise daily - continue to increase exercise  Focus on vegetables and lean protein    Follow Up:  4 weeks    Rina Isbell PharmD    CC:   Bakari Berumen D.O.

## 2023-03-24 ENCOUNTER — TELEPHONE (OUTPATIENT)
Dept: MEDICAL GROUP | Facility: PHYSICIAN GROUP | Age: 78
End: 2023-03-24
Payer: MEDICARE

## 2023-03-24 ENCOUNTER — TELEPHONE (OUTPATIENT)
Dept: VASCULAR LAB | Facility: MEDICAL CENTER | Age: 78
End: 2023-03-24
Payer: MEDICARE

## 2023-03-24 NOTE — TELEPHONE ENCOUNTER
Pt left VM trying to get a hold of Rina the pharmacists.     Call someone give her a call back. She been trying to get hold of her but isn't having a luck.    Call back: [phone]

## 2023-03-24 NOTE — TELEPHONE ENCOUNTER
Patient contacted clinic stating that her prescriptions are now >$400 total for Jardiance and Rybelsus. She will contact insurance company to find out if there are alternative medications that are preferred.     Isaac OjedaD     98.9

## 2023-03-29 ENCOUNTER — TELEPHONE (OUTPATIENT)
Dept: VASCULAR LAB | Facility: MEDICAL CENTER | Age: 78
End: 2023-03-29
Payer: MEDICARE

## 2023-03-29 NOTE — TELEPHONE ENCOUNTER
Spoke with patient to discuss if she was able to obtain her Rybelsus and Jardiance. Patient states her insurance is still not covering. Requested patient bring in her paperwork received from insurance company and rescheduled to see patient next week to discuss further therapy options.     Rina Isbell, IsaacD    ADDENDUM 3/30: Spoke with [facility] patient was denied for a copay exception. She is in the gap and has a deductible. [facility] patient liason will work with patient to discuss options and possible assistance they can offer.

## 2023-04-05 ENCOUNTER — NON-PROVIDER VISIT (OUTPATIENT)
Dept: MEDICAL GROUP | Facility: PHYSICIAN GROUP | Age: 78
End: 2023-04-05
Payer: MEDICARE

## 2023-04-05 DIAGNOSIS — Z79.4 TYPE 2 DIABETES MELLITUS WITHOUT COMPLICATION, WITH LONG-TERM CURRENT USE OF INSULIN (HCC): ICD-10-CM

## 2023-04-05 DIAGNOSIS — E11.9 TYPE 2 DIABETES MELLITUS WITHOUT COMPLICATION, WITH LONG-TERM CURRENT USE OF INSULIN (HCC): ICD-10-CM

## 2023-04-05 PROCEDURE — 99211 OFF/OP EST MAY X REQ PHY/QHP: CPT | Performed by: STUDENT IN AN ORGANIZED HEALTH CARE EDUCATION/TRAINING PROGRAM

## 2023-04-05 RX ORDER — ORAL SEMAGLUTIDE 14 MG/1
14 TABLET ORAL DAILY
Qty: 90 TABLET | Refills: 1 | Status: SHIPPED | OUTPATIENT
Start: 2023-04-05 | End: 2023-08-15

## 2023-04-05 RX ORDER — GLIMEPIRIDE 4 MG/1
4 TABLET ORAL 2 TIMES DAILY
COMMUNITY
End: 2023-05-24

## 2023-04-05 RX ORDER — EMPAGLIFLOZIN 25 MG/1
25 TABLET, FILM COATED ORAL DAILY
Qty: 90 TABLET | Refills: 1 | Status: SHIPPED | OUTPATIENT
Start: 2023-04-05 | End: 2023-08-16 | Stop reason: SDUPTHER

## 2023-04-05 NOTE — PROGRESS NOTES
Completed and faxed off PAP apps for both Jardiance and Rybelsus - see media for copies of applications.    Manuel Owens, IsaacD, BCACP

## 2023-04-05 NOTE — PROGRESS NOTES
Patient Consult Note    TIME IN: 8:15 am  TIME OUT: 8:55 am    Primary care physician: Bakari Berumen D.O.    Reason for consult: Management of Uncontrolled Type 2 Diabetes    HPI:  Zenaida Rios is a 77 y.o. old patient who comes in today for evaluation of above stated problem.    Most Recent HbA1c and POCT glucose:   Lab Results   Component Value Date/Time    HBA1C 9.8 (A) 02/22/2023 02:10 PM        Most Recent Scr:  Lab Results   Component Value Date/Time    CREATININE 0.91 07/15/2021 07:40 AM        Current Diabetes Medication Regimen  Metformin IR/ER:  1000 mg BID   GLP-1 Agent: Rybelsus 14 mg once daily - Pt has been w/o x 1.5 weeks now.   SGLT-2 Inhibitor: Jardiance 25 mg once daily   Sulfonylurea: Glimepiride 4 mg BID - Pt previously instructed to DC this, but she continued since last appt d/t running out of Rybelsus    Previous Diabetes Medications and Reason for Discontinuation  Glimepiride - Instructed to DC previously d/t therapeutic plateau    Pt has home glucometer and proper testing technique - Yes  Discussed BG Goals: FBG 80 - 130, 2hPP < 180, A1c < 7%    Pt reports blood sugars:   Before Breakfast: 102, 130, 119, 158, 185, 123, 127, 132, 98, 113, 231, 139, 124, 99    Hypoglycemia awareness - Yes  Nocturnal hypoglycemia- Denies  Hypoglycemia:  None    Pt's treatment of Hypoglycemia - 15:15 Rule    Current Exercise - Walks her dogs. 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   Exercise Goal - Increase to goal of 30 min 5x/week    Dietary - common adult diet, 2 meals per day - trying to eat more vegetables  Breakfast: skips or cereal  Lunch: sandwich  Dinner: beef vegetable soup    Foot Exam:  Monofilament exam - 4/21/2022    Preventative Management  BP regimen (ACE/ARB) - Lisinopril 40 mg once daily  Statin - Pravastatin 10 mg once daily  Last Retinal Scan - Pt to schedule prior to f/u.  Last Microalbumin/Cr - Due. Orders in place.  Last A1c -   Lab Results   Component Value Date/Time    HBA1C 9.8  (A) 02/22/2023 02:10 PM       Past Medical History:  Patient Active Problem List    Diagnosis Date Noted    Healthcare maintenance 06/01/2021    Dyslipidemia 11/19/2019    Type 2 diabetes mellitus with diabetic neuropathy, without long-term current use of insulin (HCC)     Neuropathy 01/17/2019    Vitamin D deficiency 01/17/2019    Obesity (BMI 30-39.9) 07/17/2018    Benign essential HTN 05/21/2018       Past Surgical History:  Past Surgical History:   Procedure Laterality Date    FUSION, SPINE, LUMBAR, PLIF  1/11/2010    Performed by LYNNETTE SMILEY at SURGERY [facility]    LAMINOTOMY  1/11/2010    Performed by LYNNETTE SMILEY at SURGERY [facility]    LUMBAR LAMINECTOMY DISKECTOMY  8/31/2009    OTHER ORTHOPEDIC SURGERY  1989    tibia has a tanna knee to ankle, fibula is resected    PRIMARY C SECTION  1988    OTHER  1975    left lumpectomt in breast - benign    OTHER         Allergies:  Percocet [oxycodone-acetaminophen], Demerol, and Statins [hmg-coa-r inhibitors]    Social History:  Social History     Socioeconomic History    Marital status:      Spouse name: Not on file    Number of children: Not on file    Years of education: Not on file    Highest education level: Not on file   Occupational History    Not on file   Tobacco Use    Smoking status: Never    Smokeless tobacco: Never   Vaping Use    Vaping Use: Never used   Substance and Sexual Activity    Alcohol use: No    Drug use: No    Sexual activity: Yes     Partners: Male     Comment:    Other Topics Concern    Not on file   Social History Narrative    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      Social Determinants of Health     Financial Resource Strain: Not on file   Food Insecurity: Not on file   Transportation Needs: Not on file   Physical Activity: Not on file   Stress: Not on file   Social Connections: Not on file   Intimate Partner Violence: Not on file   Housing Stability: Not on file       Family History:  Family History   Adopted:  Yes       Medications:    Current Outpatient Medications:     glimepiride (AMARYL) 4 MG Tab, Take 4 mg by mouth 2 times a day., Disp: , Rfl:     Empagliflozin (JARDIANCE) 25 MG Tab, Take 25 mg by mouth every day., Disp: 90 Tablet, Rfl: 1    Semaglutide (RYBELSUS) 14 MG Tab, Take 14 mg by mouth every day., Disp: 90 Tablet, Rfl: 1    amLODIPine (NORVASC) 10 MG Tab, Take 1 Tablet by mouth every day., Disp: 90 Tablet, Rfl: 1    lisinopril (PRINIVIL) 40 MG tablet, Take 1 Tablet by mouth every day., Disp: 90 Tablet, Rfl: 1    atenolol (TENORMIN) 50 MG Tab, Take 1 Tablet by mouth every day., Disp: 90 Tablet, Rfl: 1    pravastatin (PRAVACHOL) 10 MG Tab, TAKE 1 TABLET DAILY, Disp: 90 Tablet, Rfl: 3    metFORMIN (GLUCOPHAGE) 500 MG Tab, Take 2 Tablets by mouth 2 times a day with meals., Disp: 360 Tablet, Rfl: 0    vitamin D (CHOLECALCIFEROL) 1000 UNIT Tab, Take 1,000 Units by mouth every day., Disp: , Rfl:     glucose blood strip, 1 Each by Other route as needed., Disp: , Rfl:     STOOL SOFTENER PO, Take 1 Tab by mouth every day., Disp: , Rfl:     Labs: Reviewed    Physical Examination:  Vital signs: There were no vitals taken for this visit. There is no height or weight on file to calculate BMI.    Assessment and Plan:    1. DM2  Basic physiology of DMII was explained to patient as well as microvascular/macrovascular complications. The importance of increasing physical activity to improve diabetes control was discussed with the patient. Patient was also educated on changing diet and making better choices to help control blood sugar.   Discussed Goals: FBG 80 - 130, 2hPP < 180, a1c < 7.0%  Pt reported FBG remains mostly w/in goal at this time w/o Rybelsus.  Pt presents to clinic stating that she has hit hit the donut hole w/ her ins. Her GLP1 & SGLT2i are both unaffordable at this time given this.   Pt is out of Rybelsus x 1.5 weeks now. No changes in appetite noted at this time. Pt has 30 days supply of Jardiance at this  time.  Preliminarily, based on pt income (<$90k/yr) and household size (3), she should qualify for PAP for Jardiance and Rybelsus. Provided her w/ PAP application for both of these medications - she will present them back to clinic and we will fax and upload into media accordingly. Will forward to [facility] pharmacy liaison.  Sent pt's rx's to [facility] as they may be cheaper for her given she has [facility] ins.    - Medication changes:  Continue metformin 1000 mg BID  Continue Jardiance 25 mg once daily  Continue glimepiride 4 mg BID - for now     - Lifestyle changes:  Diet: Maximize lean proteins and veggies. Cut out/down on carbs. Avoid simple sugars.   Exercise: Increase as tolerated    Follow Up:  ~ 6 weeks in clinic for repeat A1c    Manuel Owens, PharmD, BCACP    CC:   Bakari Berumen D.O.

## 2023-04-05 NOTE — Clinical Note
Hello! Just an FYI on this pt - we're going to apply for Jardiance and Maura PAP :) Thanks & Happy Wednesday! Manuel

## 2023-04-06 ENCOUNTER — DOCUMENTATION (OUTPATIENT)
Dept: VASCULAR LAB | Facility: MEDICAL CENTER | Age: 78
End: 2023-04-06
Payer: MEDICARE

## 2023-04-06 NOTE — PROGRESS NOTES
[facility] sent fax requesting insurance information.  Information was provided and sent back to them.    Parth Herrera, IsaacD

## 2023-04-24 ENCOUNTER — DOCUMENTATION (OUTPATIENT)
Dept: VASCULAR LAB | Facility: MEDICAL CENTER | Age: 78
End: 2023-04-24
Payer: MEDICARE

## 2023-04-24 NOTE — PROGRESS NOTES
[facility] for [facility]      Called the patient and informed her that Rachel [employer] PAP sent in her Rybelsus to our clinic and it is ready for .     She informed me that she is unable to pick it up from [facility], and asked if it could be sent to [facility] for .   One of our providers will  and take with them to [place] for her and will call her and let her know when it is there for .       Matt GrayD

## 2023-04-26 ENCOUNTER — TELEPHONE (OUTPATIENT)
Dept: MEDICAL GROUP | Facility: PHYSICIAN GROUP | Age: 78
End: 2023-04-26
Payer: MEDICARE

## 2023-04-26 ENCOUNTER — DOCUMENTATION (OUTPATIENT)
Dept: VASCULAR LAB | Facility: MEDICAL CENTER | Age: 78
End: 2023-04-26
Payer: MEDICARE

## 2023-04-26 NOTE — PROGRESS NOTES
Received message below today.    Contacted patient she states her 4 month supply of dustin nordisk was sent to [facility] on [address]. Patient states she tried to contact pharmacy to see if they can send it to [facility] in [place] as it would be easier for patient as she does not drive to [place]. Pharmacy states they will not and they will redistribute her medication. Patient's Dustin Nordisk supplier is willing to send another 4 month supply to patient. Patient contacting office hoping if there is anything the Pharmacist can do.  Patient would like Pharmacist to call her back at [phone].    Returned call to patient and left detailed VM stating that [facility] does not handle the PAP from Dust[facility] and that medication is on hand here in our clinic at [facility].  I advised that one of our pharmacists, Manuel Owens, Kelly will bring medication with him to our clinic on [address] [facility] on Wednesday May 3rd and she can  at that time.  Asked for her to c/b with any questions.  Bob Vo, Kelly, BCACP

## 2023-04-26 NOTE — TELEPHONE ENCOUNTER
Contacted patient she states her 4 month supply of dustin nordisk was sent to [facility] on [address]. Patient states she tried to contact pharmacy to see if they can send it to [facility] in [place] as it would be easier for patient as she does not drive to [place]. Pharmacy states they will not and they will redistribute her medication. Patient's Dustin Nordisk supplier is willing to send another 4 month supply to patient. Patient contacting office hoping if there is anything the Pharmacist can do.  Patient would like Pharmacist to call her back at [phone].

## 2023-05-03 ENCOUNTER — DOCUMENTATION (OUTPATIENT)
Dept: CARDIOLOGY | Facility: PHYSICIAN GROUP | Age: 78
End: 2023-05-03
Payer: MEDICARE

## 2023-05-03 NOTE — PROGRESS NOTES
Dispensed pt's Rybelsus PAP (Rybelsus 14 mg #120 tabs) to her today at [facility].    Manuel Owens, PharmD, BCACP

## 2023-05-09 ENCOUNTER — TELEPHONE (OUTPATIENT)
Dept: HEALTH INFORMATION MANAGEMENT | Facility: OTHER | Age: 78
End: 2023-05-09
Payer: MEDICARE

## 2023-05-09 DIAGNOSIS — Z76.0 MEDICATION REFILL: ICD-10-CM

## 2023-05-09 DIAGNOSIS — Z79.4 TYPE 2 DIABETES MELLITUS WITHOUT COMPLICATION, WITH LONG-TERM CURRENT USE OF INSULIN (HCC): ICD-10-CM

## 2023-05-09 DIAGNOSIS — I10 BENIGN ESSENTIAL HTN: ICD-10-CM

## 2023-05-09 DIAGNOSIS — E11.9 TYPE 2 DIABETES MELLITUS WITHOUT COMPLICATION, WITH LONG-TERM CURRENT USE OF INSULIN (HCC): ICD-10-CM

## 2023-05-09 PROCEDURE — RXMED WILLOW AMBULATORY MEDICATION CHARGE: Performed by: STUDENT IN AN ORGANIZED HEALTH CARE EDUCATION/TRAINING PROGRAM

## 2023-05-09 RX ORDER — LISINOPRIL 40 MG/1
40 TABLET ORAL DAILY
Qty: 100 TABLET | Refills: 1 | Status: SHIPPED | OUTPATIENT
Start: 2023-05-09 | End: 2023-11-16 | Stop reason: SDUPTHER

## 2023-05-10 ENCOUNTER — PHARMACY VISIT (OUTPATIENT)
Dept: PHARMACY | Facility: MEDICAL CENTER | Age: 78
End: 2023-05-10
Payer: COMMERCIAL

## 2023-05-24 ENCOUNTER — NON-PROVIDER VISIT (OUTPATIENT)
Dept: MEDICAL GROUP | Facility: PHYSICIAN GROUP | Age: 78
End: 2023-05-24
Payer: MEDICARE

## 2023-05-24 DIAGNOSIS — Z79.01 CHRONIC ANTICOAGULATION: ICD-10-CM

## 2023-05-24 PROBLEM — Z59.89 ON ECONOMIC ASSISTANCE PROGRAM: Status: ACTIVE | Noted: 2023-05-24

## 2023-05-24 LAB
HBA1C MFR BLD: 8.7 % (ref ?–5.8)
POCT INT CON NEG: ABNORMAL
POCT INT CON POS: ABNORMAL

## 2023-05-24 PROCEDURE — 99211 OFF/OP EST MAY X REQ PHY/QHP: CPT | Performed by: STUDENT IN AN ORGANIZED HEALTH CARE EDUCATION/TRAINING PROGRAM

## 2023-05-24 PROCEDURE — 83036 HEMOGLOBIN GLYCOSYLATED A1C: CPT | Performed by: STUDENT IN AN ORGANIZED HEALTH CARE EDUCATION/TRAINING PROGRAM

## 2023-05-24 NOTE — PROGRESS NOTES
Patient Consult Note    TIME IN: 8:30 am  TIME OUT: 9:00 am    Primary care physician: Bakari Beurmen D.O.    Reason for consult: Management of Uncontrolled Type 2 Diabetes    HPI:  Zenaida Rios is a 77 y.o. old patient who comes in today for evaluation of above stated problem.    Most Recent HbA1c and POCT glucose:   Lab Results   Component Value Date/Time    HBA1C 8.7 (A) 05/24/2023 08:35 AM        Most Recent Scr:  Lab Results   Component Value Date/Time    CREATININE 0.91 07/15/2021 07:40 AM        Current Diabetes Medication Regimen  Metformin IR/ER:  1000 mg BID   GLP-1 Agent: Rybelsus 14 mg once daily   SGLT-2 Inhibitor: Jardiance 25 mg once daily     Previous Diabetes Medications and Reason for Discontinuation  Glimepiride - d/t therapeutic plateau (stopped one week ago)    Pt has home glucometer and proper testing technique - Yes  Discussed BG Goals: FBG 80 - 130, 2hPP < 180, A1c < 7%    Pt reports blood sugars:   Before Breakfast: 120-147 FBG    Hypoglycemia awareness - Yes  Nocturnal hypoglycemia- Denies  Hypoglycemia:  None    Pt's treatment of Hypoglycemia - 15:15 Rule    Current Exercise - Walks her dogs. 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   Exercise Goal - Increase to goal of 30 min 5x/week    Dietary - common adult diet, 2 meals per day - trying to eat more vegetables  Breakfast: skips or cereal  Lunch: sandwich  Dinner: beef vegetable soup    Foot Exam:  Monofilament exam - 4/21/2022    Preventative Management  BP regimen (ACE/ARB) - Lisinopril 40 mg once daily  Statin - Pravastatin 10 mg once daily  Last Retinal Scan - reminded patient  Last Microalbumin/Cr - Due. Reminded.   Last A1c -   Lab Results   Component Value Date/Time    HBA1C 8.7 (A) 05/24/2023 08:35 AM       Past Medical History:  Patient Active Problem List    Diagnosis Date Noted    Healthcare maintenance 06/01/2021    Dyslipidemia 11/19/2019    Type 2 diabetes mellitus with diabetic neuropathy, without  long-term current use of insulin (HCC)     Neuropathy 01/17/2019    Vitamin D deficiency 01/17/2019    Obesity (BMI 30-39.9) 07/17/2018    Benign essential HTN 05/21/2018       Past Surgical History:  Past Surgical History:   Procedure Laterality Date    FUSION, SPINE, LUMBAR, PLIF  1/11/2010    Performed by LYNNETTE SMILEY at SURGERY [facility]    LAMINOTOMY  1/11/2010    Performed by LYNNETTE SMILEY at SURGERY [facility]    LUMBAR LAMINECTOMY DISKECTOMY  8/31/2009    OTHER ORTHOPEDIC SURGERY  1989    tibia has a tanna knee to ankle, fibula is resected    PRIMARY C SECTION  1988    OTHER  1975    left lumpectomt in breast - benign    OTHER         Allergies:  Percocet [oxycodone-acetaminophen], Demerol, and Statins [hmg-coa-r inhibitors]    Social History:  Social History     Socioeconomic History    Marital status:      Spouse name: Not on file    Number of children: Not on file    Years of education: Not on file    Highest education level: Not on file   Occupational History    Not on file   Tobacco Use    Smoking status: Never    Smokeless tobacco: Never   Vaping Use    Vaping Use: Never used   Substance and Sexual Activity    Alcohol use: No    Drug use: No    Sexual activity: Yes     Partners: Male     Comment:    Other Topics Concern    Not on file   Social History Narrative    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      Social Determinants of Health     Financial Resource Strain: Not on file   Food Insecurity: Not on file   Transportation Needs: Not on file   Physical Activity: Not on file   Stress: Not on file   Social Connections: Not on file   Intimate Partner Violence: Not on file   Housing Stability: Not on file       Family History:  Family History   Adopted: Yes       Medications:    Current Outpatient Medications:     lisinopril (PRINIVIL) 40 MG tablet, Take 1 Tablet by mouth every day., Disp: 100 Tablet, Rfl: 1    metFORMIN (GLUCOPHAGE) 500 MG Tab, Take 2 Tablets by mouth 2 times a  day with meals., Disp: 100 Tablet, Rfl: 13    Empagliflozin (JARDIANCE) 25 MG Tab, Take 1 tab (25 mg) by mouth every day., Disp: 90 Tablet, Rfl: 1    Semaglutide (RYBELSUS) 14 MG Tab, Take 1 tab (14 mg) by mouth every day., Disp: 90 Tablet, Rfl: 1    amLODIPine (NORVASC) 10 MG Tab, Take 1 Tablet by mouth every day., Disp: 90 Tablet, Rfl: 1    atenolol (TENORMIN) 50 MG Tab, Take 1 Tablet by mouth every day., Disp: 90 Tablet, Rfl: 1    pravastatin (PRAVACHOL) 10 MG Tab, TAKE 1 TABLET DAILY, Disp: 90 Tablet, Rfl: 3    vitamin D (CHOLECALCIFEROL) 1000 UNIT Tab, Take 1,000 Units by mouth every day., Disp: , Rfl:     STOOL SOFTENER PO, Take 1 Tab by mouth every day., Disp: , Rfl:     glucose blood strip, 1 Each by Other route as needed., Disp: , Rfl:     Labs: Reviewed    Physical Examination:  Vital signs: There were no vitals taken for this visit. There is no height or weight on file to calculate BMI.    Assessment and Plan:    1. DM2   Discussed Goals: FBG 80 - 130, 2hPP < 180, a1c < 7.0%  Based on patient's age and co-morbidities A1c goal of <7.5% is reasonable  Reported FBG slightly above goal  A1c decreased to 8.7% from 9.8%  Pt receiving Rybelsus via PAP, is tolerating medication without side effects or issues. Last received 4 month supply (shipped to [facility])  She has not heard back from [employer] (Jardiance PAP). Provided patient with phone number to call and will ask for assistance from [facility] PAP team. She is currently paying ~$100 per month from [facility] pharmacy.     - Medication changes:  Continue   metformin 1000 mg BID   Jardiance 25 mg once daily  Rybelsus 14 mg daily    - Lifestyle changes:  Diet: Maximize lean proteins and veggies. Cut out/down on carbs. Avoid simple sugars.   Exercise: Increase w/ goal of 30 minutes daily    Follow Up:  2 months    Rina Isbell, Kelly    CC:   Bakari Berumen D.O.

## 2023-06-14 ENCOUNTER — TELEPHONE (OUTPATIENT)
Dept: VASCULAR LAB | Facility: MEDICAL CENTER | Age: 78
End: 2023-06-14
Payer: MEDICARE

## 2023-07-19 ENCOUNTER — PHARMACY VISIT (OUTPATIENT)
Dept: PHARMACY | Facility: MEDICAL CENTER | Age: 78
End: 2023-07-19
Payer: COMMERCIAL

## 2023-07-19 ENCOUNTER — NON-PROVIDER VISIT (OUTPATIENT)
Dept: MEDICAL GROUP | Facility: PHYSICIAN GROUP | Age: 78
End: 2023-07-19
Payer: MEDICARE

## 2023-07-19 DIAGNOSIS — E11.9 TYPE 2 DIABETES MELLITUS WITHOUT COMPLICATION, WITHOUT LONG-TERM CURRENT USE OF INSULIN (HCC): ICD-10-CM

## 2023-07-19 PROCEDURE — RXMED WILLOW AMBULATORY MEDICATION CHARGE: Performed by: STUDENT IN AN ORGANIZED HEALTH CARE EDUCATION/TRAINING PROGRAM

## 2023-07-19 PROCEDURE — 99211 OFF/OP EST MAY X REQ PHY/QHP: CPT | Performed by: NURSE PRACTITIONER

## 2023-07-19 RX ORDER — GLIMEPIRIDE 4 MG/1
4 TABLET ORAL EVERY MORNING
Qty: 100 TABLET | Refills: 0 | Status: SHIPPED | OUTPATIENT
Start: 2023-07-19 | End: 2023-08-16 | Stop reason: SDUPTHER

## 2023-07-19 NOTE — PROGRESS NOTES
Patient Consult Note    TIME IN: 8:30 am  TIME OUT: 8:55 am    Primary care physician: Bakari Berumen D.O.    Reason for consult: Management of Uncontrolled Type 2 Diabetes    HPI:  Zenaida Rios is a 77 y.o. old patient who comes in today for evaluation of above stated problem.    Most Recent HbA1c and POCT glucose:   Lab Results   Component Value Date/Time    HBA1C 8.7 (A) 05/24/2023 08:35 AM        Most Recent Scr:  Lab Results   Component Value Date/Time    CREATININE 0.91 07/15/2021 07:40 AM        Current Diabetes Medication Regimen  Metformin IR/ER:  1000 mg BID   GLP-1 Agent: Rybelsus 14 mg once daily - stopped in June  SGLT-2 Inhibitor: Jardiance 25 mg once daily - [facility] PAP    Previous Diabetes Medications and Reason for Discontinuation  Glimepiride - d/t therapeutic plateau (stopped one week ago)    Pt has home glucometer and proper testing technique - Yes  Discussed BG Goals: FBG 80 - 130, 2hPP < 180, A1c < 7%    Pt reports blood sugars:   Before Breakfast: 123, 164, 143, 133, 162, 224, 172, 146, 122, 120, 143, 117    Hypoglycemia awareness - Yes  Nocturnal hypoglycemia- Denies  Hypoglycemia:  None    Pt's treatment of Hypoglycemia - 15:15 Rule    Current Exercise - Walks her dogs. 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   Exercise Goal - Increase to goal of 30 min 5x/week    Dietary - common adult diet, 2 meals per day - trying to eat more vegetables  Breakfast: skips or cereal  Lunch: sandwich  Dinner: beef vegetable soup    Foot Exam:  Monofilament exam - 4/21/2022    Preventative Management  BP regimen (ACE/ARB) - Lisinopril 40 mg once daily  Statin - Pravastatin 10 mg once daily  Last Retinal Scan - reminded patient  Last Microalbumin/Cr - Due. Reminded.   Last A1c -   Lab Results   Component Value Date/Time    HBA1C 8.7 (A) 05/24/2023 08:35 AM       Past Medical History:  Patient Active Problem List    Diagnosis Date Noted    On [employer] assistance program 05/24/2023     Healthcare maintenance 06/01/2021    Dyslipidemia 11/19/2019    Type 2 diabetes mellitus with diabetic neuropathy, without long-term current use of insulin (HCC)     Neuropathy 01/17/2019    Vitamin D deficiency 01/17/2019    Obesity (BMI 30-39.9) 07/17/2018    Benign essential HTN 05/21/2018       Past Surgical History:  Past Surgical History:   Procedure Laterality Date    FUSION, SPINE, LUMBAR, PLIF  1/11/2010    Performed by LYNNETTE SMILEY at SURGERY [facility]    LAMINOTOMY  1/11/2010    Performed by LYNNETTE SMILEY at SURGERY [facility]    LUMBAR LAMINECTOMY DISKECTOMY  8/31/2009    OTHER ORTHOPEDIC SURGERY  1989    tibia has a tanna knee to ankle, fibula is resected    PRIMARY C SECTION  1988    OTHER  1975    left lumpectomt in breast - benign    OTHER         Allergies:  Percocet [oxycodone-acetaminophen], Demerol, and Statins [hmg-coa-r inhibitors]    Social History:  Social History     Socioeconomic History    Marital status:      Spouse name: Not on file    Number of children: Not on file    Years of education: Not on file    Highest education level: Not on file   Occupational History    Not on file   Tobacco Use    Smoking status: Never    Smokeless tobacco: Never   Vaping Use    Vaping Use: Never used   Substance and Sexual Activity    Alcohol use: No    Drug use: No    Sexual activity: Yes     Partners: Male     Comment:    Other Topics Concern    Not on file   Social History Narrative    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      Social Determinants of Health     Financial Resource Strain: Not on file   Food Insecurity: Not on file   Transportation Needs: Not on file   Physical Activity: Not on file   Stress: Not on file   Social Connections: Not on file   Intimate Partner Violence: Not on file   Housing Stability: Not on file       Family History:  Family History   Adopted: Yes       Medications:    Current Outpatient Medications:     lisinopril (PRINIVIL) 40 MG tablet, Take 1  Tablet by mouth every day., Disp: 100 Tablet, Rfl: 1    metFORMIN (GLUCOPHAGE) 500 MG Tab, Take 2 Tablets by mouth 2 times a day with meals., Disp: 100 Tablet, Rfl: 13    Empagliflozin (JARDIANCE) 25 MG Tab, Take 1 tab (25 mg) by mouth every day., Disp: 90 Tablet, Rfl: 1    Semaglutide (RYBELSUS) 14 MG Tab, Take 1 tab (14 mg) by mouth every day., Disp: 90 Tablet, Rfl: 1    amLODIPine (NORVASC) 10 MG Tab, Take 1 Tablet by mouth every day., Disp: 90 Tablet, Rfl: 1    atenolol (TENORMIN) 50 MG Tab, Take 1 Tablet by mouth every day., Disp: 90 Tablet, Rfl: 1    pravastatin (PRAVACHOL) 10 MG Tab, TAKE 1 TABLET DAILY, Disp: 90 Tablet, Rfl: 3    vitamin D (CHOLECALCIFEROL) 1000 UNIT Tab, Take 1,000 Units by mouth every day., Disp: , Rfl:     glucose blood strip, 1 Each by Other route as needed., Disp: , Rfl:     STOOL SOFTENER PO, Take 1 Tab by mouth every day., Disp: , Rfl:     Labs: Reviewed    Physical Examination:  Vital signs: There were no vitals taken for this visit. There is no height or weight on file to calculate BMI.    Assessment and Plan:    1. DM2   Discussed Goals: FBG 80 - 130, 2hPP < 180, a1c < 7.0%  Based on patient's age and co-morbidities A1c goal of <7.5% is reasonable  Reported FBG fluctuates but above goal  Pt was receiving Rybelsus via PAP, but states she is no longer tolerating. States abdominal pain and lethargy does not want to retry at lower dose.   Patient wishes to retry Glimepiride, educated patient on risk of hypoglycemia and pancreatic burnout. She would still like to try. If patient has FBG <70 recommend that she reduce glimepiride dose to 2 mg.     - Medication changes:  Continue   metformin 1000 mg BID   Jardiance 25 mg once daily    STOP Rybelsus 14 mg daily  Start Glimepiride 4 mg daily    - Lifestyle changes:  Diet: Maximize lean proteins and veggies. Cut out/down on carbs. Avoid simple sugars.   Exercise: Increase w/ goal of 30 minutes daily    Follow Up:  1 months, a1c due    Rina  Kelly Isbell    CC:   Bakari Berumen D.O.

## 2023-07-21 ENCOUNTER — PHARMACY VISIT (OUTPATIENT)
Dept: PHARMACY | Facility: MEDICAL CENTER | Age: 78
End: 2023-07-21
Payer: COMMERCIAL

## 2023-07-21 PROCEDURE — RXMED WILLOW AMBULATORY MEDICATION CHARGE: Performed by: STUDENT IN AN ORGANIZED HEALTH CARE EDUCATION/TRAINING PROGRAM

## 2023-08-15 ENCOUNTER — OFFICE VISIT (OUTPATIENT)
Dept: MEDICAL GROUP | Facility: PHYSICIAN GROUP | Age: 78
End: 2023-08-15
Payer: MEDICARE

## 2023-08-15 VITALS
DIASTOLIC BLOOD PRESSURE: 80 MMHG | BODY MASS INDEX: 27.25 KG/M2 | TEMPERATURE: 97.7 F | HEART RATE: 72 BPM | HEIGHT: 62 IN | OXYGEN SATURATION: 97 % | RESPIRATION RATE: 12 BRPM | SYSTOLIC BLOOD PRESSURE: 144 MMHG

## 2023-08-15 DIAGNOSIS — M79.604 RIGHT LEG PAIN: ICD-10-CM

## 2023-08-15 DIAGNOSIS — E55.9 VITAMIN D DEFICIENCY: ICD-10-CM

## 2023-08-15 DIAGNOSIS — E11.40 TYPE 2 DIABETES MELLITUS WITH DIABETIC NEUROPATHY, WITHOUT LONG-TERM CURRENT USE OF INSULIN (HCC): ICD-10-CM

## 2023-08-15 DIAGNOSIS — M54.31 SCIATIC NERVE PAIN, RIGHT: ICD-10-CM

## 2023-08-15 DIAGNOSIS — G62.9 NEUROPATHY: ICD-10-CM

## 2023-08-15 DIAGNOSIS — E78.2 MIXED HYPERLIPIDEMIA: ICD-10-CM

## 2023-08-15 DIAGNOSIS — E11.9 TYPE 2 DIABETES MELLITUS WITHOUT COMPLICATION, WITHOUT LONG-TERM CURRENT USE OF INSULIN (HCC): ICD-10-CM

## 2023-08-15 DIAGNOSIS — Z00.00 HEALTHCARE MAINTENANCE: ICD-10-CM

## 2023-08-15 DIAGNOSIS — Z98.1 HISTORY OF LUMBAR FUSION: ICD-10-CM

## 2023-08-15 PROCEDURE — 3079F DIAST BP 80-89 MM HG: CPT | Performed by: STUDENT IN AN ORGANIZED HEALTH CARE EDUCATION/TRAINING PROGRAM

## 2023-08-15 PROCEDURE — 99214 OFFICE O/P EST MOD 30 MIN: CPT | Performed by: STUDENT IN AN ORGANIZED HEALTH CARE EDUCATION/TRAINING PROGRAM

## 2023-08-15 PROCEDURE — 3077F SYST BP >= 140 MM HG: CPT | Performed by: STUDENT IN AN ORGANIZED HEALTH CARE EDUCATION/TRAINING PROGRAM

## 2023-08-15 RX ORDER — GABAPENTIN 100 MG/1
100 CAPSULE ORAL 3 TIMES DAILY
Qty: 90 CAPSULE | Refills: 0 | Status: SHIPPED | OUTPATIENT
Start: 2023-08-15

## 2023-08-15 ASSESSMENT — ENCOUNTER SYMPTOMS
FEVER: 0
DIZZINESS: 0
WHEEZING: 0
PALPITATIONS: 0
VOMITING: 0
NAUSEA: 0
ORTHOPNEA: 0
FOCAL WEAKNESS: 0
COUGH: 0
ABDOMINAL PAIN: 0
CHILLS: 0
HEADACHES: 0
SHORTNESS OF BREATH: 0

## 2023-08-15 NOTE — ASSESSMENT & PLAN NOTE
Patient has a history of chronic back pain with right-sided sciatic symptoms.  She has had a lumbar fusion done but still has pain that runs down her right leg.  Has some numbness and tingling in both legs likely due to diabetes.    She reports that gabapentin has worked for her in the past, will start 100 mg 3 times daily.  Referral to physical therapy

## 2023-08-15 NOTE — ASSESSMENT & PLAN NOTE
Patient will be going to see her eye doctor in the next few weeks, advised to send records  Monofilament exam today within normal limits  A1c still out of range at 8.7 continue current regimen of glimepiride 4mg daily, metformin 500mg bid, Jardiance 25 mg daily.  Discontinue Rybelsus.  Continue seeing diabetic pharmacotherapy services.    Follow-up 2 months for annual wellness visit

## 2023-08-15 NOTE — PROGRESS NOTES
HISTORY OF PRESENT ILLNESS: Zenaida SCOTT is a pleasant 77 y.o. female, established patient who presents today to discuss medical problems as listed below:    Problem   History of Lumbar Fusion   Sciatic Nerve Pain, Right   Type 2 Diabetes Mellitus With Diabetic Neuropathy, Without Long-Term Current Use of Insulin ([facility])    Had reaction to rybelsus. Taking glimeperide 4mg, metformin 500mg bid, jardiance 25mg daily.  Last A1c 8.7.  Reports that she is staying away from carbohydrates, sugars and exercising more          Current Outpatient Medications Ordered in Epic   Medication Sig Dispense Refill    gabapentin (NEURONTIN) 100 MG Cap Take 1 Capsule by mouth 3 times a day. 90 Capsule 0    glimepiride (AMARYL) 4 MG Tab Take 1 Tablet by mouth every morning. 100 Tablet 0    lisinopril (PRINIVIL) 40 MG tablet Take 1 Tablet by mouth every day. 100 Tablet 1    metFORMIN (GLUCOPHAGE) 500 MG Tab Take 2 Tablets by mouth 2 times a day with meals. 100 Tablet 13    Empagliflozin (JARDIANCE) 25 MG Tab Take 1 tab (25 mg) by mouth every day. 90 Tablet 1    amLODIPine (NORVASC) 10 MG Tab Take 1 Tablet by mouth every day. 90 Tablet 1    atenolol (TENORMIN) 50 MG Tab Take 1 Tablet by mouth every day. 90 Tablet 1    pravastatin (PRAVACHOL) 10 MG Tab TAKE 1 TABLET DAILY 90 Tablet 3    vitamin D (CHOLECALCIFEROL) 1000 UNIT Tab Take 1,000 Units by mouth every day.      glucose blood strip 1 Each by Other route as needed.      STOOL SOFTENER PO Take 1 Tab by mouth every day.       No current [facility]-ordered facility-administered medications on file.       Review of systems:  Review of Systems   Constitutional:  Negative for chills and fever.   Respiratory:  Negative for cough, shortness of breath and wheezing.    Cardiovascular:  Negative for chest pain, palpitations, orthopnea and leg swelling.   Gastrointestinal:  Negative for abdominal pain, nausea and vomiting.   Genitourinary:  Negative for dysuria and urgency.   Musculoskeletal:  Negative  for joint pain.   Neurological:  Negative for dizziness, focal weakness and headaches.         Past Medical History:   Diagnosis Date    Acute left-sided low back pain with left-sided sciatica 9/3/2019    Decreased GFR 7/18/2019    Healthcare maintenance 6/1/2021    Leg weakness, bilateral 9/3/2019    Medication refill 3/20/2020     Past Surgical History:   Procedure Laterality Date    FUSION, SPINE, LUMBAR, PLIF  1/11/2010    Performed by LYNNETTE SMILEY at SURGERY [facility]    LAMINOTOMY  1/11/2010    Performed by LYNNETTE SMILEY at SURGERY [facility]    LUMBAR LAMINECTOMY DISKECTOMY  8/31/2009    OTHER ORTHOPEDIC SURGERY  1989    tibia has a tanna knee to ankle, fibula is resected    PRIMARY C SECTION  1988    OTHER  1975    left lumpectomt in breast - benign    OTHER       Social History     Tobacco Use    Smoking status: Never    Smokeless tobacco: Never   Vaping Use    Vaping Use: Never used   Substance Use Topics    Alcohol use: No    Drug use: No      Family History   Adopted: Yes     Current Outpatient Medications   Medication Sig Dispense Refill    gabapentin (NEURONTIN) 100 MG Cap Take 1 Capsule by mouth 3 times a day. 90 Capsule 0    glimepiride (AMARYL) 4 MG Tab Take 1 Tablet by mouth every morning. 100 Tablet 0    lisinopril (PRINIVIL) 40 MG tablet Take 1 Tablet by mouth every day. 100 Tablet 1    metFORMIN (GLUCOPHAGE) 500 MG Tab Take 2 Tablets by mouth 2 times a day with meals. 100 Tablet 13    Empagliflozin (JARDIANCE) 25 MG Tab Take 1 tab (25 mg) by mouth every day. 90 Tablet 1    amLODIPine (NORVASC) 10 MG Tab Take 1 Tablet by mouth every day. 90 Tablet 1    atenolol (TENORMIN) 50 MG Tab Take 1 Tablet by mouth every day. 90 Tablet 1    pravastatin (PRAVACHOL) 10 MG Tab TAKE 1 TABLET DAILY 90 Tablet 3    vitamin D (CHOLECALCIFEROL) 1000 UNIT Tab Take 1,000 Units by mouth every day.      glucose blood strip 1 Each by Other route as needed.      STOOL SOFTENER PO Take 1 Tab by mouth every  "day.       No current facility-administered medications for this visit.       Allergies:  Allergies   Allergen Reactions    Percocet [Oxycodone-Acetaminophen] Swelling    Demerol     Statins [Hmg-Coa-R Inhibitors]        Allergies, past medical history, past surgical history, family history, social history reviewed and updated.    Objective:    BP (!) 144/80 (BP Location: Left arm, Patient Position: Sitting, BP Cuff Size: Adult)   Pulse 72   Temp 36.5 °C (97.7 °F) (Temporal)   Resp 12   Ht 1.575 m (5' 2\")   SpO2 97%   BMI 27.25 kg/m²    Body mass index is 27.25 kg/m².    Physical exam:  General: Normal appearance, no acute distress, not ill-appearing  HEENT: EOM intact, conjunctiva normal limits, negative right/left eye discharge.  Sclera anicteric  Cardiovascular: Normal rate and rhythm, no murmurs  Pulmonary: No respiratory distress, no wheezing, no rales, breath sounds normal.  Musculoskeletal: No edema bilaterally, muscle strength 5 out of 5 bilateral lower extremities, normal range of motion in hip and knee.  Skin: Warm, dry, no lesions  Neurological: No focal deficits, normal gait  Psychiatric: Mood within normal limits    Monofilament testing with a 10 gram force: sensation intact: intact bilaterally  Visual Inspection: Feet without maceration, ulcers, fissures.  Pedal pulses: intact bilaterally      Assessment/Plan:    Problem List Items Addressed This Visit       Neuropathy    Relevant Medications    gabapentin (NEURONTIN) 100 MG Cap    Other Relevant Orders    TSH WITH REFLEX TO FT4    CBC WITHOUT DIFFERENTIAL    Vitamin D deficiency    Relevant Orders    VITAMIN D,25 HYDROXY (DEFICIENCY)    Type 2 diabetes mellitus with diabetic neuropathy, without long-term current use of insulin (HCC)     Patient will be going to see her eye doctor in the next few weeks, advised to send records  Monofilament exam today within normal limits  A1c still out of range at 8.7 continue current regimen of glimepiride 4mg " daily, metformin 500mg bid, Jardiance 25 mg daily.  Discontinue Rybelsus.  Continue seeing diabetic pharmacotherapy services.    Follow-up 2 months for annual wellness visit         Relevant Medications    gabapentin (NEURONTIN) 100 MG Cap    Healthcare maintenance    History of lumbar fusion     Patient has a history of chronic back pain with right-sided sciatic symptoms.  She has had a lumbar fusion done but still has pain that runs down her right leg.  Has some numbness and tingling in both legs likely due to diabetes.    She reports that gabapentin has worked for her in the past, will start 100 mg 3 times daily.  Referral to physical therapy         Relevant Medications    gabapentin (NEURONTIN) 100 MG Cap    Sciatic nerve pain, right    Relevant Medications    gabapentin (NEURONTIN) 100 MG Cap    Other Relevant Orders    Referral to Physical Therapy     Other Visit Diagnoses       Type 2 diabetes mellitus without complication, without long-term current use of insulin (HCC)        Relevant Orders    Diabetic Monofilament LE Exam (Completed)    HEMOGLOBIN A1C    MICROALBUMIN CREAT RATIO URINE    Comp Metabolic Panel    Mixed hyperlipidemia        Relevant Orders    LIPID PANEL    Right leg pain        Relevant Orders    Referral to Physical Therapy             Return in about 2 months (around 10/15/2023) for awv.

## 2023-08-16 DIAGNOSIS — Z76.0 MEDICATION REFILL: ICD-10-CM

## 2023-08-16 DIAGNOSIS — I10 BENIGN ESSENTIAL HTN: ICD-10-CM

## 2023-08-16 DIAGNOSIS — E78.5 DYSLIPIDEMIA: ICD-10-CM

## 2023-08-16 DIAGNOSIS — E78.2 MIXED HYPERLIPIDEMIA: ICD-10-CM

## 2023-08-16 DIAGNOSIS — E11.9 TYPE 2 DIABETES MELLITUS WITHOUT COMPLICATION, WITHOUT LONG-TERM CURRENT USE OF INSULIN (HCC): ICD-10-CM

## 2023-08-16 DIAGNOSIS — E11.9 TYPE 2 DIABETES MELLITUS WITHOUT COMPLICATION, WITH LONG-TERM CURRENT USE OF INSULIN (HCC): ICD-10-CM

## 2023-08-16 DIAGNOSIS — Z79.4 TYPE 2 DIABETES MELLITUS WITHOUT COMPLICATION, WITH LONG-TERM CURRENT USE OF INSULIN (HCC): ICD-10-CM

## 2023-08-16 PROCEDURE — RXMED WILLOW AMBULATORY MEDICATION CHARGE: Performed by: STUDENT IN AN ORGANIZED HEALTH CARE EDUCATION/TRAINING PROGRAM

## 2023-08-16 RX ORDER — GLIMEPIRIDE 4 MG/1
4 TABLET ORAL EVERY MORNING
Qty: 100 TABLET | Refills: 0 | Status: SHIPPED | OUTPATIENT
Start: 2023-08-16 | End: 2024-02-02 | Stop reason: SDUPTHER

## 2023-08-16 RX ORDER — LISINOPRIL 40 MG/1
40 TABLET ORAL DAILY
Qty: 100 TABLET | Refills: 1 | Status: SHIPPED | OUTPATIENT
Start: 2023-08-16 | End: 2023-08-30

## 2023-08-16 RX ORDER — EMPAGLIFLOZIN 25 MG/1
25 TABLET, FILM COATED ORAL DAILY
Qty: 100 TABLET | Refills: 1 | Status: SHIPPED | OUTPATIENT
Start: 2023-08-16 | End: 2024-02-14

## 2023-08-16 RX ORDER — AMLODIPINE BESYLATE 10 MG/1
10 TABLET ORAL DAILY
Qty: 100 TABLET | Refills: 1 | Status: SHIPPED | OUTPATIENT
Start: 2023-08-16 | End: 2024-02-16 | Stop reason: SDUPTHER

## 2023-08-16 RX ORDER — PRAVASTATIN SODIUM 10 MG
10 TABLET ORAL DAILY
Qty: 100 TABLET | Refills: 1 | Status: SHIPPED | OUTPATIENT
Start: 2023-08-16 | End: 2023-08-30

## 2023-08-16 RX ORDER — ATENOLOL 50 MG/1
50 TABLET ORAL DAILY
Qty: 100 TABLET | Refills: 1 | Status: SHIPPED | OUTPATIENT
Start: 2023-08-16 | End: 2024-02-05 | Stop reason: SDUPTHER

## 2023-08-16 NOTE — TELEPHONE ENCOUNTER
Pt states pharmacy did not received prescriptions.     [facility] Mail Order

## 2023-08-17 ENCOUNTER — PHARMACY VISIT (OUTPATIENT)
Dept: PHARMACY | Facility: MEDICAL CENTER | Age: 78
End: 2023-08-17
Payer: COMMERCIAL

## 2023-08-21 PROCEDURE — RXMED WILLOW AMBULATORY MEDICATION CHARGE: Performed by: STUDENT IN AN ORGANIZED HEALTH CARE EDUCATION/TRAINING PROGRAM

## 2023-08-22 ENCOUNTER — PHARMACY VISIT (OUTPATIENT)
Dept: PHARMACY | Facility: MEDICAL CENTER | Age: 78
End: 2023-08-22
Payer: COMMERCIAL

## 2023-08-25 ENCOUNTER — TELEPHONE (OUTPATIENT)
Dept: VASCULAR LAB | Facility: MEDICAL CENTER | Age: 78
End: 2023-08-25
Payer: MEDICARE

## 2023-08-25 NOTE — TELEPHONE ENCOUNTER
Called and notified pt via  that we have received her Rybelsus PAP shipment.    Pharmacist will bring down to the [place] Office for pt to p/u.    Manuel Owens, PharmD, BCACP

## 2023-08-30 ENCOUNTER — NON-PROVIDER VISIT (OUTPATIENT)
Dept: MEDICAL GROUP | Facility: PHYSICIAN GROUP | Age: 78
End: 2023-08-30
Payer: MEDICARE

## 2023-08-30 DIAGNOSIS — E11.40 TYPE 2 DIABETES MELLITUS WITH DIABETIC NEUROPATHY, WITHOUT LONG-TERM CURRENT USE OF INSULIN (HCC): ICD-10-CM

## 2023-08-30 LAB
HBA1C MFR BLD: 8.4 % (ref ?–5.8)
POCT INT CON NEG: NEGATIVE
POCT INT CON POS: POSITIVE

## 2023-08-30 PROCEDURE — 83036 HEMOGLOBIN GLYCOSYLATED A1C: CPT | Performed by: PHYSICIAN ASSISTANT

## 2023-08-30 PROCEDURE — 99211 OFF/OP EST MAY X REQ PHY/QHP: CPT | Performed by: PHYSICIAN ASSISTANT

## 2023-08-30 NOTE — PROGRESS NOTES
"Patient Consult Note    TIME IN: 8:55 am  TIME OUT: 9:25 am    Primary care physician: Bakari Berumen D.O.    Reason for consult: Management of Uncontrolled Type 2 Diabetes    HPI:  Zenaida Rios is a 77 y.o. old patient who comes in today for evaluation of above stated problem.    Most Recent HbA1c and POCT glucose:   Lab Results   Component Value Date/Time    HBA1C 8.4 (A) 08/30/2023 12:00 AM        Most Recent Scr:  Lab Results   Component Value Date/Time    CREATININE 0.91 07/15/2021 07:40 AM        Current Diabetes Medication Regimen  Metformin IR/ER:  1000 mg BID   GLP-1 Agent: Rybelsus 14 mg once daily - No longer taking d/t lethargy, fatigue, and abd pain   SGLT-2 Inhibitor: Jardiance 25 mg once daily   Sulfonylurea: Glimepiride 4 mg once daily    Previous Diabetes Medications and Reason for Discontinuation  Pioglitazone - pt states \"horrible reaction\" many years ago that she cannot recall   Rybelsus - lethargy, fatigue, and abd pain     Pt has home glucometer and proper testing technique - Yes  Discussed BG Goals: FBG 80 - 130, 2hPP < 180, A1c < 7%    Pt reports blood sugars:   Before Breakfast: 117, low 100's typically, pt states never > 130.    Hypoglycemia awareness - Yes  Nocturnal hypoglycemia- Denies  Hypoglycemia:  None    Pt's treatment of Hypoglycemia - 15:15 Rule    Current Exercise - Walks her dogs. 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 Has 2 horses and 4 dogs that she walks and works w/.  Exercise Goal - Increase to goal of 30 min 5x/week    Dietary:  Breakfast - None typically  Lunch - Cheese sticks, sandwich (PB), \"whatever is in the refigerator\"  Dinner - Cooks meals at home. Typically meat, potato, vegetables.  Snacks - Cheese, crackers  Desserts - Avoids sweets mostly  Drinks - Coffee w/ creamer    Foot Exam:  Monofilament exam - Completed 8/2023    Preventative Management  BP regimen (ACE/ARB) - Lisinopril 40 mg once daily  Statin - Pravastatin 10 mg once " daily  Last Retinal Scan - Scheduled for 9/5/23  Last Microalbumin/Cr - Due. Orders in place.  Last A1c -   Lab Results   Component Value Date/Time    HBA1C 8.4 (A) 08/30/2023 12:00 AM          Past Medical History:  Patient Active Problem List    Diagnosis Date Noted    History of lumbar fusion 08/15/2023    Sciatic nerve pain, right 08/15/2023    On economic assistance program 05/24/2023    Healthcare maintenance 06/01/2021    Dyslipidemia 11/19/2019    Type 2 diabetes mellitus with diabetic neuropathy, without long-term current use of insulin (HCC)     Neuropathy 01/17/2019    Vitamin D deficiency 01/17/2019    Obesity (BMI 30-39.9) 07/17/2018    Benign essential HTN 05/21/2018       Past Surgical History:  Past Surgical History:   Procedure Laterality Date    FUSION, SPINE, LUMBAR, PLIF  1/11/2010    Performed by LYNNETTE SMILEY at SURGERY [facility]    LAMINOTOMY  1/11/2010    Performed by LYNNETTE SMILEY at SURGERY [facility]    LUMBAR LAMINECTOMY DISKECTOMY  8/31/2009    OTHER ORTHOPEDIC SURGERY  1989    tibia has a tanna knee to ankle, fibula is resected    PRIMARY C SECTION  1988    OTHER  1975    left lumpectomt in breast - benign    OTHER         Allergies:  Percocet [oxycodone-acetaminophen], Demerol, and Statins [hmg-coa-r inhibitors]    Social History:  Social History     Socioeconomic History    Marital status:      Spouse name: Not on file    Number of children: Not on file    Years of education: Not on file    Highest education level: Not on file   Occupational History    Not on file   Tobacco Use    Smoking status: Never    Smokeless tobacco: Never   Vaping Use    Vaping Use: Never used   Substance and Sexual Activity    Alcohol use: No    Drug use: No    Sexual activity: Yes     Partners: Male     Comment:    Other Topics Concern    Not on file   Social History Narrative    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      Social Determinants of Health     Financial Resource Strain:  Not on file   Food Insecurity: Not on file   Transportation Needs: Not on file   Physical Activity: Not on file   Stress: Not on file   Social Connections: Not on file   Intimate Partner Violence: Not on file   Housing Stability: Not on file       Family History:  Family History   Adopted: Yes       Medications:    Current Outpatient Medications:     amLODIPine (NORVASC) 10 MG Tab, Take 1 Tablet by mouth every day., Disp: 100 Tablet, Rfl: 1    atenolol (TENORMIN) 50 MG Tab, Take 1 Tablet by mouth every day., Disp: 100 Tablet, Rfl: 1    Empagliflozin (JARDIANCE) 25 MG Tab, Take 1 tab (25 mg) by mouth every day., Disp: 100 Tablet, Rfl: 1    glimepiride (AMARYL) 4 MG Tab, Take 1 Tablet by mouth every morning., Disp: 100 Tablet, Rfl: 0    gabapentin (NEURONTIN) 100 MG Cap, Take 1 Capsule by mouth 3 times a day., Disp: 90 Capsule, Rfl: 0    lisinopril (PRINIVIL) 40 MG tablet, Take 1 Tablet by mouth every day., Disp: 100 Tablet, Rfl: 1    metFORMIN (GLUCOPHAGE) 500 MG Tab, Take 2 Tablets by mouth 2 times a day with meals., Disp: 100 Tablet, Rfl: 13    pravastatin (PRAVACHOL) 10 MG Tab, TAKE 1 TABLET DAILY, Disp: 90 Tablet, Rfl: 3    vitamin D (CHOLECALCIFEROL) 1000 UNIT Tab, Take 1,000 Units by mouth every day., Disp: , Rfl:     glucose blood strip, 1 Each by Other route as needed., Disp: , Rfl:     STOOL SOFTENER PO, Take 1 Tab by mouth every day., Disp: , Rfl:     Labs: Reviewed    Physical Examination:  Vital signs: There were no vitals taken for this visit. There is no height or weight on file to calculate BMI.    Assessment and Plan:    1. DM2  Pt presents to clinic doing well since last visit. She reports that she is no longer using Ryblesus d/t SE that resolved upon DC of GLP1. See above for more detail.  Conducted repeat A1c today in clinic - it decreased slightly from 8.7% (5/2023) to 8.4% (8/2023).  Pt reported FBG are completely at goal at this time. Pt states BG have improved dramatically in the last 4 weeks  or so since her last visit.  Given A1c is elevated but FBG appears controlled, advised pt to also test 2hPP BG to see if she is having highs after eating.  Pt instructed to call Rybelsus PAP program to inform them that she is no longer taking the Rx.   Discussed addition of Mounjaro w/ pt today - she declines. She would like to work on lifestyle measures and continue her current DM regimen at this time.    - Medication changes:  DC Rybelsus  Continue all other DM medications     - Lifestyle changes:  Diet: Maximize lean proteins and veggies. Cut out/down on carbs. Avoid simple sugars.   Exercise: Increase as tolerated    Follow Up:  6 weeks    Manuel Owens, PharmD, BCACP    CC:   Bakari Berumen D.O.

## 2023-09-14 ENCOUNTER — OFFICE VISIT (OUTPATIENT)
Dept: URGENT CARE | Facility: CLINIC | Age: 78
End: 2023-09-14
Payer: MEDICARE

## 2023-09-14 VITALS
WEIGHT: 146 LBS | DIASTOLIC BLOOD PRESSURE: 80 MMHG | SYSTOLIC BLOOD PRESSURE: 124 MMHG | TEMPERATURE: 98.6 F | HEART RATE: 68 BPM | OXYGEN SATURATION: 96 % | HEIGHT: 62 IN | RESPIRATION RATE: 18 BRPM | BODY MASS INDEX: 26.87 KG/M2

## 2023-09-14 DIAGNOSIS — G89.29 CHRONIC FOOT PAIN, RIGHT: ICD-10-CM

## 2023-09-14 DIAGNOSIS — M79.671 CHRONIC FOOT PAIN, RIGHT: ICD-10-CM

## 2023-09-14 PROCEDURE — 99212 OFFICE O/P EST SF 10 MIN: CPT | Performed by: NURSE PRACTITIONER

## 2023-09-14 PROCEDURE — 3079F DIAST BP 80-89 MM HG: CPT | Performed by: NURSE PRACTITIONER

## 2023-09-14 PROCEDURE — 3074F SYST BP LT 130 MM HG: CPT | Performed by: NURSE PRACTITIONER

## 2023-09-14 ASSESSMENT — ENCOUNTER SYMPTOMS
PSYCHIATRIC NEGATIVE: 1
NEUROLOGICAL NEGATIVE: 1
CARDIOVASCULAR NEGATIVE: 1
CONSTITUTIONAL NEGATIVE: 1
RESPIRATORY NEGATIVE: 1
GASTROINTESTINAL NEGATIVE: 1
EYES NEGATIVE: 1

## 2023-09-14 NOTE — LETTER
September 14, 2023       Patient: Zenaida Rios   YOB: 1945   Date of Visit: 9/14/2023         To Whom It May Concern:    In my medical opinion, I recommend that Zenaida Rios be excused from work from 9/14/2023 through 9/16/2023 due to medical condition.    If you have any questions or concerns, please don't hesitate to call [phone]          Sincerely,          Gloria Hernandez A.P.R.N.  Electronically Signed

## 2023-09-14 NOTE — PROGRESS NOTES
"Subjective:   Zenaida Rios is a 77 y.o. female who presents for Foot Problem (Right leg hurting cant put  weight on foot and pain moves up towards knee)      Patient presents for evaluation of acute on chronic right foot pain.  Patient reports a long history of fractures and surgery in her right foot, ankle, tibia, and fibula.  She was in PT yesterday, and did some exercises she has not done prior, and now has had a flare of pain.  Patient states the pain goes from the top of her foot and radiates to her mid calf and shin area.  She reports the pain was so bad last night she had to crawl around her house.  She did apply heat to the area, and states that now she can bear some weight on it, but it is extremely painful.  She reports that she does have to work today which is standing on her feet for 10 of 12 hours.  She also has to work the following 2 days past that.      Review of Systems   Constitutional: Negative.    HENT: Negative.     Eyes: Negative.    Respiratory: Negative.     Cardiovascular: Negative.    Gastrointestinal: Negative.    Genitourinary: Negative.    Musculoskeletal:         Right foot pain   Skin: Negative.    Neurological: Negative.    Psychiatric/Behavioral: Negative.         Medications, Allergies, and current problem list reviewed today in Epic.     Objective:     /80   Pulse 68   Temp 37 °C (98.6 °F) (Temporal)   Resp 18   Ht 1.575 m (5' 2\")   Wt 66.2 kg (146 lb)   SpO2 96%     Physical Exam  Vitals reviewed.   Constitutional:       General: She is not in acute distress.     Appearance: Normal appearance.   HENT:      Head: Normocephalic and atraumatic.      Nose: Nose normal.      Mouth/Throat:      Mouth: Mucous membranes are moist.      Pharynx: Oropharynx is clear.   Eyes:      Extraocular Movements: Extraocular movements intact.      Conjunctiva/sclera: Conjunctivae normal.      Pupils: Pupils are equal, round, and reactive to light.   Cardiovascular:      Rate and " Rhythm: Normal rate and regular rhythm.      Pulses:           Dorsalis pedis pulses are 1+ on the right side.        Posterior tibial pulses are 1+ on the right side.   Pulmonary:      Effort: Pulmonary effort is normal.      Breath sounds: Normal breath sounds.   Abdominal:      General: Abdomen is flat.      Palpations: Abdomen is soft.   Musculoskeletal:         General: Normal range of motion.      Cervical back: Normal range of motion and neck supple.      Right foot: Normal range of motion. No deformity, bunion, Charcot foot, foot drop or prominent metatarsal heads.   Feet:      Right foot:      Skin integrity: Skin integrity normal. No ulcer, blister, skin breakdown, erythema, warmth, callus, dry skin or fissure.      Toenail Condition: Right toenails are abnormally thick.   Skin:     General: Skin is warm and dry.      Capillary Refill: Capillary refill takes less than 2 seconds.   Neurological:      General: No focal deficit present.      Mental Status: She is alert.   Psychiatric:         Mood and Affect: Mood normal.         Behavior: Behavior normal.       Assessment/Plan:     Diagnosis and associated orders:     1. Chronic foot pain, right           Comments/MDM:     This suspect patient has an acute flare of her chronic foot pain due to the physical therapy sessions she participated in yesterday.  Advised patient to use ice, Tylenol, anti-inflammatories and elevation.  If patient does not improve over the next few days, she will contact her provider who is guiding her physical therapy.  Patient verbalizes understanding and is in agreement with the treatment plan.  Note for off work given for patient today.       Differential diagnosis, natural history, supportive care, and indications for immediate follow-up discussed.    Advised the patient to follow-up with the primary care physician for recheck, reevaluation, and consideration of further management.    Please note that this dictation was created  using voice recognition software. I have made a reasonable attempt to correct obvious errors, but I expect that there are errors of grammar and possibly content that I did not discover before finalizing the note.    This note was electronically signed by MATI Estrada

## 2023-10-11 ENCOUNTER — NON-PROVIDER VISIT (OUTPATIENT)
Dept: MEDICAL GROUP | Facility: PHYSICIAN GROUP | Age: 78
End: 2023-10-11
Payer: MEDICARE

## 2023-10-11 PROCEDURE — 99211 OFF/OP EST MAY X REQ PHY/QHP: CPT | Performed by: STUDENT IN AN ORGANIZED HEALTH CARE EDUCATION/TRAINING PROGRAM

## 2023-10-11 NOTE — PROGRESS NOTES
"Patient Consult Note    TIME IN: 8:58 am  TIME OUT: 9:15 am    Primary care physician: Bakari Berumen D.O.    Reason for consult: Management of Uncontrolled Type 2 Diabetes    HPI:  Zenaida Rios is a 77 y.o. old patient who comes in today for evaluation of above stated problem.    Most Recent HbA1c and POCT glucose:   Lab Results   Component Value Date/Time    HBA1C 8.4 (A) 08/30/2023 12:00 AM        Most Recent Scr:  Lab Results   Component Value Date/Time    CREATININE 0.91 07/15/2021 07:40 AM        Current Diabetes Medication Regimen  Metformin IR/ER:  1000 mg BID   SGLT-2 Inhibitor: Jardiance 25 mg once daily   Sulfonylurea: Glimepiride 4 mg once daily    Previous Diabetes Medications and Reason for Discontinuation  Pioglitazone - pt states \"horrible reaction\" many years ago that she cannot recall   Rybelsus - lethargy, fatigue, and abd pain     Pt has home glucometer and proper testing technique - Yes  Discussed BG Goals: FBG 80 - 130, 2hPP < 180, A1c < 7%    Pt reports blood sugars:   Before Breakfast: 97 this AM, running in low 100's - patient has not been checking post prandial    Hypoglycemia awareness - Yes  Nocturnal hypoglycemia- Denies  Hypoglycemia:  None    Pt's treatment of Hypoglycemia - 15:15 Rule    Current Exercise - Walks her dogs. 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 Has had more pain in her legs preventing her from exercise.  Exercise Goal - Increase to goal of 30 min 5x/week    Dietary: unchanged from last time  Breakfast - None typically  Lunch - Cheese sticks, sandwich (PB), \"whatever is in the refigerator\"  Dinner - Cooks meals at home. Typically meat, potato, vegetables.  Snacks - Cheese, crackers  Desserts - Avoids sweets mostly  Drinks - Coffee w/ creamer    Foot Exam:  Monofilament exam - Completed 8/2023    Preventative Management  BP regimen (ACE/ARB) - Lisinopril 40 mg once daily  Statin - Pravastatin 10 mg once daily  Last Retinal Scan - Scheduled for " 9/5/23  Last Microalbumin/Cr - Due. Orders in place.  Last A1c -   Lab Results   Component Value Date/Time    HBA1C 8.4 (A) 08/30/2023 12:00 AM          Past Medical History:  Patient Active Problem List    Diagnosis Date Noted    History of lumbar fusion 08/15/2023    Sciatic nerve pain, right 08/15/2023    On economic assistance program 05/24/2023    Healthcare maintenance 06/01/2021    Dyslipidemia 11/19/2019    Type 2 diabetes mellitus with diabetic neuropathy, without long-term current use of insulin (HCC)     Neuropathy 01/17/2019    Vitamin D deficiency 01/17/2019    Obesity (BMI 30-39.9) 07/17/2018    Benign essential HTN 05/21/2018       Past Surgical History:  Past Surgical History:   Procedure Laterality Date    FUSION, SPINE, LUMBAR, PLIF  1/11/2010    Performed by LYNNETTE SMILEY at SURGERY [facility]    LAMINOTOMY  1/11/2010    Performed by LYNNETTE SMILEY at SURGERY [facility]    LUMBAR LAMINECTOMY DISKECTOMY  8/31/2009    OTHER ORTHOPEDIC SURGERY  1989    tibia has a tanna knee to ankle, fibula is resected    PRIMARY C SECTION  1988    OTHER  1975    left lumpectomt in breast - benign    OTHER         Allergies:  Percocet [oxycodone-acetaminophen], Demerol, and Statins [hmg-coa-r inhibitors]    Social History:  Social History     Socioeconomic History    Marital status:      Spouse name: Not on file    Number of children: Not on file    Years of education: Not on file    Highest education level: Not on file   Occupational History    Not on file   Tobacco Use    Smoking status: Never    Smokeless tobacco: Never   Vaping Use    Vaping Use: Never used   Substance and Sexual Activity    Alcohol use: No    Drug use: No    Sexual activity: Yes     Partners: Male     Comment:    Other Topics Concern    Not on file   Social History Narrative    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      Social Determinants of Health     Financial Resource Strain: Not on file   Food Insecurity: Not on file    Transportation Needs: Not on file   Physical Activity: Not on file   Stress: Not on file   Social Connections: Not on file   Intimate Partner Violence: Not on file   Housing Stability: Not on file       Family History:  Family History   Adopted: Yes       Medications:    Current Outpatient Medications:     amLODIPine (NORVASC) 10 MG Tab, Take 1 Tablet by mouth every day., Disp: 100 Tablet, Rfl: 1    atenolol (TENORMIN) 50 MG Tab, Take 1 Tablet by mouth every day., Disp: 100 Tablet, Rfl: 1    Empagliflozin (JARDIANCE) 25 MG Tab, Take 1 tab (25 mg) by mouth every day., Disp: 100 Tablet, Rfl: 1    glimepiride (AMARYL) 4 MG Tab, Take 1 Tablet by mouth every morning., Disp: 100 Tablet, Rfl: 0    gabapentin (NEURONTIN) 100 MG Cap, Take 1 Capsule by mouth 3 times a day., Disp: 90 Capsule, Rfl: 0    lisinopril (PRINIVIL) 40 MG tablet, Take 1 Tablet by mouth every day., Disp: 100 Tablet, Rfl: 1    metFORMIN (GLUCOPHAGE) 500 MG Tab, Take 2 Tablets by mouth 2 times a day with meals., Disp: 100 Tablet, Rfl: 13    pravastatin (PRAVACHOL) 10 MG Tab, TAKE 1 TABLET DAILY, Disp: 90 Tablet, Rfl: 3    vitamin D (CHOLECALCIFEROL) 1000 UNIT Tab, Take 1,000 Units by mouth every day., Disp: , Rfl:     STOOL SOFTENER PO, Take 1 Tab by mouth every day., Disp: , Rfl:     glucose blood strip, 1 Each by Other route as needed., Disp: , Rfl:     Labs: Reviewed    Physical Examination:  Vital signs: There were no vitals taken for this visit. There is no height or weight on file to calculate BMI.    Assessment and Plan:    1. DM2  Pt FBG are still within goal - she forgot to bring her meter with her today. She also did not remember to check her PP BG.   States she is feeling well aside for leg pain that flared up after PT. This is limiting her ability to exercise.   Discussed possibility of needing to start GLP1 again or insulin if her A1c does not improve at next visit. Patient understands but would like to continue with lifestyle changes at  this time.     - Medication changes:  Continue all DM medications as above - no changes    - Lifestyle changes:  Diet: Maximize lean proteins and veggies. Cut out/down on carbs. Avoid simple sugars.   Exercise: Increase as tolerated    Follow Up:  6 weeks for A1c    Isaac OjedaD    CC:   Bakari Berumen D.O.

## 2023-10-17 ENCOUNTER — APPOINTMENT (OUTPATIENT)
Dept: MEDICAL GROUP | Facility: PHYSICIAN GROUP | Age: 78
End: 2023-10-17
Payer: MEDICARE

## 2023-10-18 ENCOUNTER — APPOINTMENT (OUTPATIENT)
Dept: MEDICAL GROUP | Facility: PHYSICIAN GROUP | Age: 78
End: 2023-10-18
Payer: MEDICARE

## 2023-10-21 ENCOUNTER — OFFICE VISIT (OUTPATIENT)
Dept: URGENT CARE | Facility: CLINIC | Age: 78
End: 2023-10-21
Payer: MEDICARE

## 2023-10-21 VITALS
RESPIRATION RATE: 20 BRPM | WEIGHT: 160 LBS | SYSTOLIC BLOOD PRESSURE: 174 MMHG | HEIGHT: 62 IN | BODY MASS INDEX: 29.44 KG/M2 | DIASTOLIC BLOOD PRESSURE: 88 MMHG | TEMPERATURE: 98 F | OXYGEN SATURATION: 97 % | HEART RATE: 80 BPM

## 2023-10-21 DIAGNOSIS — R03.0 ELEVATED BLOOD PRESSURE READING: ICD-10-CM

## 2023-10-21 DIAGNOSIS — L03.116 CELLULITIS OF LEG, LEFT: ICD-10-CM

## 2023-10-21 DIAGNOSIS — S81.802A WOUND OF LEFT LOWER EXTREMITY, INITIAL ENCOUNTER: ICD-10-CM

## 2023-10-21 PROCEDURE — 3079F DIAST BP 80-89 MM HG: CPT | Performed by: NURSE PRACTITIONER

## 2023-10-21 PROCEDURE — 3077F SYST BP >= 140 MM HG: CPT | Performed by: NURSE PRACTITIONER

## 2023-10-21 PROCEDURE — 99213 OFFICE O/P EST LOW 20 MIN: CPT | Performed by: NURSE PRACTITIONER

## 2023-10-21 RX ORDER — AMOXICILLIN AND CLAVULANATE POTASSIUM 875; 125 MG/1; MG/1
1 TABLET, FILM COATED ORAL 2 TIMES DAILY
Qty: 14 TABLET | Refills: 0 | Status: SHIPPED | OUTPATIENT
Start: 2023-10-21 | End: 2023-10-28

## 2023-10-21 RX ORDER — DOXYCYCLINE 100 MG/1
100 CAPSULE ORAL 2 TIMES DAILY
Qty: 14 CAPSULE | Refills: 0 | Status: SHIPPED | OUTPATIENT
Start: 2023-10-21 | End: 2023-10-28

## 2023-10-21 RX ORDER — M-VIT,TX,IRON,MINS/CALC/FOLIC 27MG-0.4MG
1 TABLET ORAL DAILY
COMMUNITY

## 2023-10-21 ASSESSMENT — ENCOUNTER SYMPTOMS
FEVER: 0
CHILLS: 0

## 2023-10-21 ASSESSMENT — PAIN SCALES - GENERAL: PAINLEVEL: NO PAIN

## 2023-10-21 NOTE — PATIENT INSTRUCTIONS
-Tylenol as directed for pain.   -RICE Therapy: Rest and  Elevation  -Gently clean area with mild soap and water.  -Follow up as planned with PCP.     Follow up sooner for signs of infection (redness, red streaking, pus, foul odor, severe pain, increased swelling or fever) or any other concerns. Follow up emergently for severe uncontrolled pain, neurovascular compromise (decreased sensation, motion, or circulation).

## 2023-10-21 NOTE — PROGRESS NOTES
Subjective:     Zenaida Rios is a 77 y.o. female who presents for Leg Injury (Per pt, little scrap that turned into a bigger wound within the last past 2-weeks, redness, tender)      Presents for a wound to left shin. Reportedly started as a small sore, possible from something in the barn or with working with the animals. States symptoms flared over the last week. Denies purulent drainage. Has been using Neosporin.       Leg Injury   The incident occurred more than 1 week ago. The pain is mild. Pertinent negatives include no loss of motion.       Past Medical History:   Diagnosis Date    Acute left-sided low back pain with left-sided sciatica 9/3/2019    Decreased GFR 7/18/2019    Healthcare maintenance 6/1/2021    Leg weakness, bilateral 9/3/2019    Medication refill 3/20/2020       Past Surgical History:   Procedure Laterality Date    FUSION, SPINE, LUMBAR, PLIF  1/11/2010    Performed by LYNNETTE SMILEY at SURGERY [facility]    LAMINOTOMY  1/11/2010    Performed by LYNNETTE SMILEY at SURGERY [facility]    LUMBAR LAMINECTOMY DISKECTOMY  8/31/2009    OTHER ORTHOPEDIC SURGERY  1989    tibia has a tanna knee to ankle, fibula is resected    PRIMARY C SECTION  1988    OTHER  1975    left lumpectomt in breast - benign    OTHER         Social History     Socioeconomic History    Marital status:      Spouse name: Not on file    Number of children: Not on file    Years of education: Not on file    Highest education level: Not on file   Occupational History    Not on file   Tobacco Use    Smoking status: Never    Smokeless tobacco: Never   Vaping Use    Vaping Use: Never used   Substance and Sexual Activity    Alcohol use: No    Drug use: No    Sexual activity: Yes     Partners: Male     Comment:    Other Topics Concern    Not on file   Social History Narrative    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        Objective:   BP (!) 174/88 (BP Location: Right arm, Patient Position: Sitting, BP Cuff Size: Adult)   Pulse 80   Temp 36.7 °C (98 °F) (Temporal)   Resp 20   Ht 1.575 m (5' 2\")   Wt 72.6 kg (160 lb) Comment: Pt refused to be weighed, verble weight  SpO2 97%   BMI 29.26 kg/m²     Physical Exam  Vitals reviewed.   Cardiovascular:      Pulses: Normal pulses.   Pulmonary:      Effort: Pulmonary effort is normal.   Skin:     General: Skin is warm and dry.      Findings: Erythema and wound present.      Comments: Left shin: 14 x 14 cm area of erythema with central 2 cm scab. No palpable abscess formation. No drainage.    Neurological:      Mental Status: She is alert and oriented to person, place, and time.         Assessment/Plan:   1. Cellulitis of leg, left  - amoxicillin-clavulanate (AUGMENTIN) 875-125 MG Tab; Take 1 Tablet by mouth 2 times a day for 7 days.  Dispense: 14 Tablet; Refill: 0  - doxycycline (MONODOX) 100 MG capsule; Take 1 Capsule by mouth 2 times a day for 7 days.  Dispense: 14 Capsule; Refill: 0    2. Wound of left lower extremity, initial encounter  - amoxicillin-clavulanate (AUGMENTIN) 875-125 MG Tab; Take 1 Tablet by mouth 2 times a day for 7 days.  Dispense: 14 Tablet; Refill: 0  - doxycycline (MONODOX) 100 MG capsule; Take 1 Capsule by mouth 2 times a day for 7 days.  Dispense: 14 Capsule; Refill: 0    -Marked wound for close monitoring.   -Tylenol as directed for pain.   -RICE Therapy: Rest and  Elevation  -Gently clean area " with mild soap and water.  -Follow up as planned with PCP.     Follow up sooner for signs of infection (redness, red streaking, pus, foul odor, severe pain, increased swelling or fever) or any other concerns. Follow up emergently for severe uncontrolled pain, neurovascular compromise (decreased sensation, motion, or circulation).    -Tetanus vaccination status reviewed: last tetanus booster within 10 years. Hx of DM. Dual antibiotic coverage initiated to cover mixed wound infection. Reviewed previous labs, calculated creatinine clearance 46. No medication dosage adjustment needed. Pt has an appointment with her PCP on Tuesday.    Differential diagnosis, natural history, supportive care, and indications for immediate follow-up discussed.

## 2023-10-21 NOTE — LETTER
October 21, 2023         Patient: Zenaida Rios   YOB: 1945   Date of Visit: 10/21/2023           To Whom it May Concern:    Zenaida Rios was seen in my clinic on 10/21/2023. She may return to work on 10/23/2023.    If you have any questions or concerns, please don't hesitate to call.        Sincerely,           CHRISTINE Wilburn.  Electronically Signed

## 2023-10-23 ASSESSMENT — ENCOUNTER SYMPTOMS: LOSS OF MOTION: 0

## 2023-10-24 ENCOUNTER — OFFICE VISIT (OUTPATIENT)
Dept: MEDICAL GROUP | Facility: PHYSICIAN GROUP | Age: 78
End: 2023-10-24
Payer: MEDICARE

## 2023-10-24 VITALS
BODY MASS INDEX: 29.26 KG/M2 | HEIGHT: 62 IN | OXYGEN SATURATION: 97 % | RESPIRATION RATE: 12 BRPM | HEART RATE: 76 BPM | SYSTOLIC BLOOD PRESSURE: 142 MMHG | DIASTOLIC BLOOD PRESSURE: 74 MMHG | TEMPERATURE: 96.6 F

## 2023-10-24 DIAGNOSIS — Z23 NEED FOR VACCINATION: ICD-10-CM

## 2023-10-24 DIAGNOSIS — I10 BENIGN ESSENTIAL HTN: ICD-10-CM

## 2023-10-24 DIAGNOSIS — L03.90 WOUND CELLULITIS: ICD-10-CM

## 2023-10-24 DIAGNOSIS — E78.5 DYSLIPIDEMIA: ICD-10-CM

## 2023-10-24 DIAGNOSIS — N17.9 AKI (ACUTE KIDNEY INJURY) (HCC): ICD-10-CM

## 2023-10-24 DIAGNOSIS — E11.40 TYPE 2 DIABETES MELLITUS WITH DIABETIC NEUROPATHY, WITHOUT LONG-TERM CURRENT USE OF INSULIN (HCC): ICD-10-CM

## 2023-10-24 PROCEDURE — 3078F DIAST BP <80 MM HG: CPT | Performed by: STUDENT IN AN ORGANIZED HEALTH CARE EDUCATION/TRAINING PROGRAM

## 2023-10-24 PROCEDURE — G0008 ADMIN INFLUENZA VIRUS VAC: HCPCS | Performed by: STUDENT IN AN ORGANIZED HEALTH CARE EDUCATION/TRAINING PROGRAM

## 2023-10-24 PROCEDURE — 3077F SYST BP >= 140 MM HG: CPT | Performed by: STUDENT IN AN ORGANIZED HEALTH CARE EDUCATION/TRAINING PROGRAM

## 2023-10-24 PROCEDURE — 90662 IIV NO PRSV INCREASED AG IM: CPT | Performed by: STUDENT IN AN ORGANIZED HEALTH CARE EDUCATION/TRAINING PROGRAM

## 2023-10-24 PROCEDURE — 99214 OFFICE O/P EST MOD 30 MIN: CPT | Mod: 25 | Performed by: STUDENT IN AN ORGANIZED HEALTH CARE EDUCATION/TRAINING PROGRAM

## 2023-10-24 RX ORDER — ORAL SEMAGLUTIDE 3 MG/1
3 TABLET ORAL DAILY
Qty: 30 TABLET | Refills: 0 | Status: SHIPPED | OUTPATIENT
Start: 2023-10-24 | End: 2023-11-29

## 2023-10-24 RX ORDER — ORAL SEMAGLUTIDE 7 MG/1
7 TABLET ORAL DAILY
Qty: 60 TABLET | Refills: 1 | Status: SHIPPED | OUTPATIENT
Start: 2023-10-24 | End: 2024-01-17

## 2023-10-24 ASSESSMENT — ENCOUNTER SYMPTOMS
NAUSEA: 0
ORTHOPNEA: 0
COUGH: 0
ABDOMINAL PAIN: 0
WHEEZING: 0
FOCAL WEAKNESS: 0
SHORTNESS OF BREATH: 0
FEVER: 0
DIZZINESS: 0
PALPITATIONS: 0
HEADACHES: 0
CHILLS: 0
VOMITING: 0

## 2023-10-24 NOTE — ASSESSMENT & PLAN NOTE
Continue pravastatin 10 mg tablet daily  I would like to increase this however we will hold off in the setting of adding a new diabetic medication  Follow-up 1 month

## 2023-10-24 NOTE — ASSESSMENT & PLAN NOTE
Type 2 diabetes, uncontrolled  A1c 9.4    Plan:  At this point it would be best to either add insulin or a GLP-1 agonist.  She reports that she would not like to do any injections.  She is open to trying Rybelsus again, though it had some stomach upset symptoms in the past.  She is very hesitant to start insulin    Start Rybelsus 3 mg for 30 days then 7 mg tablet daily thereafter    Continue metformin 1000 twice daily, Jardiance 25 mg daily, glimepiride 4 mg daily    Continue lisinopril for renal protection, microalbuminuria present.    Counseled on diet, exercise interventions.    Follow-up 1 month, diabetic retinal exam at that time

## 2023-10-24 NOTE — ASSESSMENT & PLAN NOTE
Reports her amatory blood pressures are between 120 and 140    Follow-up 1 month with blood pressure logs.  Continue lisinopril 40 mg daily, atenolol 50 mg daily, amlodipine 10 mg daily.

## 2023-10-24 NOTE — ASSESSMENT & PLAN NOTE
Patient was the urgent care on 10/21/2022 for large left shin wound infection.  She has been taking Augmentin, doxycycline with improvement.  The margins of the erythema are receding inside of the marker line.  I would like to see her back in 2 weeks to monitor this.  Continue antibiotics start Rybelsus for blood sugar control.

## 2023-10-24 NOTE — PROGRESS NOTES
HISTORY OF PRESENT ILLNESS: Zenaida SCOTT is a pleasant 77 y.o. female, established patient who presents today to discuss medical problems as listed below:    Problem   [place] (Acute Kidney Injury) (Hcc)   Wound Cellulitis   Dyslipidemia   Type 2 Diabetes Mellitus With Diabetic Neuropathy, Without Long-Term Current Use of Insulin (Hcc)    Currently on metformin 1000 mg twice daily, Jardiance 25 mg daily, glimepiride 4 mg daily.    She has tried Rybelsus in the past with side effects    Here to review recent labs showing an A1c of 9.4.  She is following up with diabetic pharmacy services.     Benign Essential Htn    Home pressures are around 130s and diastolic 80.     Denies dizziness, chest pain, sob.           Current Outpatient Medications Ordered in Epic   Medication Sig Dispense Refill    Semaglutide (RYBELSUS) 3 MG Tab Take 1 tablet (3 mg) by mouth every day. For 30 days. 30 Tablet 0    Semaglutide (RYBELSUS) 7 MG Tab Take1 tablet (7 mg) by mouth every day. Start after 30 days of 3mg dose. 60 Tablet 1    therapeutic multivitamin-minerals (THERAGRAN-M) Tab Take 1 Tablet by mouth every day.      amoxicillin-clavulanate (AUGMENTIN) 875-125 MG Tab Take 1 Tablet by mouth 2 times a day for 7 days. 14 Tablet 0    doxycycline (MONODOX) 100 MG capsule Take 1 Capsule by mouth 2 times a day for 7 days. 14 Capsule 0    amLODIPine (NORVASC) 10 MG Tab Take 1 Tablet by mouth every day. 100 Tablet 1    atenolol (TENORMIN) 50 MG Tab Take 1 Tablet by mouth every day. 100 Tablet 1    Empagliflozin (JARDIANCE) 25 MG Tab Take 1 tab (25 mg) by mouth every day. 100 Tablet 1    glimepiride (AMARYL) 4 MG Tab Take 1 Tablet by mouth every morning. 100 Tablet 0    gabapentin (NEURONTIN) 100 MG Cap Take 1 Capsule by mouth 3 times a day. (Patient taking differently: Take 10 mg by mouth every day.) 90 Capsule 0    lisinopril (PRINIVIL) 40 MG tablet Take 1 Tablet by mouth every day. 100 Tablet 1    metFORMIN (GLUCOPHAGE) 500 MG Tab Take 2 Tablets  by mouth 2 times a day with meals. 100 Tablet 13    pravastatin (PRAVACHOL) 10 MG Tab TAKE 1 TABLET DAILY 90 Tablet 3    vitamin D (CHOLECALCIFEROL) 1000 UNIT Tab Take 1,000 Units by mouth every day.      glucose blood strip 1 Each by Other route as needed.      STOOL SOFTENER PO Take 1 Tab by mouth every day.       No current [facility]-ordered facility-administered medications on file.       Review of systems:  Review of Systems   Constitutional:  Negative for chills and fever.   Respiratory:  Negative for cough, shortness of breath and wheezing.    Cardiovascular:  Negative for chest pain, palpitations, orthopnea and leg swelling.   Gastrointestinal:  Negative for abdominal pain, nausea and vomiting.   Genitourinary:  Negative for dysuria.   Musculoskeletal:  Negative for joint pain.   Neurological:  Negative for dizziness, focal weakness and headaches.         Patient Active Problem List    Diagnosis Date Noted    ELICEO (acute kidney injury) (HCC) 10/24/2023    Wound cellulitis 10/24/2023    History of lumbar fusion 08/15/2023    Sciatic nerve pain, right 08/15/2023    On economic assistance program 05/24/2023    Healthcare maintenance 06/01/2021    Dyslipidemia 11/19/2019    Type 2 diabetes mellitus with diabetic neuropathy, without long-term current use of insulin ([facility])     Neuropathy 01/17/2019    Vitamin D deficiency 01/17/2019    Obesity (BMI 30-39.9) 07/17/2018    Benign essential HTN 05/21/2018     Past Surgical History:   Procedure Laterality Date    FUSION, SPINE, LUMBAR, PLIF  1/11/2010    Performed by LYNNETTE SMILEY at SURGERY [facility]    LAMINOTOMY  1/11/2010    Performed by LYNNETTE SMILEY at SURGERY [facility]    LUMBAR LAMINECTOMY DISKECTOMY  8/31/2009    OTHER ORTHOPEDIC SURGERY  1989    tibia has a tanna knee to ankle, fibula is resected    PRIMARY C SECTION  1988    OTHER  1975    left lumpectomt in breast - benign    OTHER       Social History     Tobacco Use    Smoking status: Never     Smokeless tobacco: Never   Vaping Use    Vaping Use: Never used   Substance Use Topics    Alcohol use: No    Drug use: No      Family History   Adopted: Yes     Current Outpatient Medications   Medication Sig Dispense Refill    Semaglutide (RYBELSUS) 3 MG Tab Take 1 tablet (3 mg) by mouth every day. For 30 days. 30 Tablet 0    Semaglutide (RYBELSUS) 7 MG Tab Take1 tablet (7 mg) by mouth every day. Start after 30 days of 3mg dose. 60 Tablet 1    therapeutic multivitamin-minerals (THERAGRAN-M) Tab Take 1 Tablet by mouth every day.      amoxicillin-clavulanate (AUGMENTIN) 875-125 MG Tab Take 1 Tablet by mouth 2 times a day for 7 days. 14 Tablet 0    doxycycline (MONODOX) 100 MG capsule Take 1 Capsule by mouth 2 times a day for 7 days. 14 Capsule 0    amLODIPine (NORVASC) 10 MG Tab Take 1 Tablet by mouth every day. 100 Tablet 1    atenolol (TENORMIN) 50 MG Tab Take 1 Tablet by mouth every day. 100 Tablet 1    Empagliflozin (JARDIANCE) 25 MG Tab Take 1 tab (25 mg) by mouth every day. 100 Tablet 1    glimepiride (AMARYL) 4 MG Tab Take 1 Tablet by mouth every morning. 100 Tablet 0    gabapentin (NEURONTIN) 100 MG Cap Take 1 Capsule by mouth 3 times a day. (Patient taking differently: Take 10 mg by mouth every day.) 90 Capsule 0    lisinopril (PRINIVIL) 40 MG tablet Take 1 Tablet by mouth every day. 100 Tablet 1    metFORMIN (GLUCOPHAGE) 500 MG Tab Take 2 Tablets by mouth 2 times a day with meals. 100 Tablet 13    pravastatin (PRAVACHOL) 10 MG Tab TAKE 1 TABLET DAILY 90 Tablet 3    vitamin D (CHOLECALCIFEROL) 1000 UNIT Tab Take 1,000 Units by mouth every day.      glucose blood strip 1 Each by Other route as needed.      STOOL SOFTENER PO Take 1 Tab by mouth every day.       No current facility-administered medications for this visit.       Allergies:  Allergies   Allergen Reactions    Percocet [Oxycodone-Acetaminophen] Swelling    Demerol     Statins [Hmg-Coa-R Inhibitors]        Allergies, past medical history, past  "surgical history, family history, social history reviewed and updated.    Objective:    BP (!) 142/74 (BP Location: Right arm, Patient Position: Sitting, BP Cuff Size: Adult)   Pulse 76   Temp 35.9 °C (96.6 °F) (Temporal)   Resp 12   Ht 1.575 m (5' 2\")   SpO2 97%   BMI 29.26 kg/m²    Body mass index is 29.26 kg/m².    Physical exam:  General: Normal appearance, no acute distress, not ill-appearing  HEENT: EOM intact, conjunctiva normal limits, negative right/left eye discharge.  Sclera anicteric  Cardiovascular: Normal rate and rhythm, no murmurs  Pulmonary: No respiratory distress, no wheezing, no rales, breath sounds normal.  Musculoskeletal: No edema bilaterally  Skin: Left shin anterior large area of erythema with 5 cm diameter scabbed over wound with no purulent drainage.  Neurological: No focal deficits, normal gait  Psychiatric: Mood within normal limits    Assessment/Plan:    Problem List Items Addressed This Visit       Benign essential HTN     Reports her amatory blood pressures are between 120 and 140    Follow-up 1 month with blood pressure logs.  Continue lisinopril 40 mg daily, atenolol 50 mg daily, amlodipine 10 mg daily.         Type 2 diabetes mellitus with diabetic neuropathy, without long-term current use of insulin ([facility])     Type 2 diabetes, uncontrolled  A1c 9.4    Plan:  At this point it would be best to either add insulin or a GLP-1 agonist.  She reports that she would not like to do any injections.  She is open to trying Rybelsus again, though it had some stomach upset symptoms in the past.  She is very hesitant to start insulin    Start Rybelsus 3 mg for 30 days then 7 mg tablet daily thereafter    Continue metformin 1000 twice daily, Jardiance 25 mg daily, glimepiride 4 mg daily    Continue lisinopril for renal protection, microalbuminuria present.    Counseled on diet, exercise interventions.    Follow-up 1 month, diabetic retinal exam at that time         Relevant Medications    " Semaglutide (RYBELSUS) 3 MG Tab    Semaglutide (RYBELSUS) 7 MG Tab    Dyslipidemia     Continue pravastatin 10 mg tablet daily  I would like to increase this however we will hold off in the setting of adding a new diabetic medication  Follow-up 1 month         ELICEO (acute kidney injury) (HCC)     GFR 47, creatinine 1.19.  I suspect ELICEO from fasting/dehydration    Repeat follow-up 1 month         Relevant Orders    Basic Metabolic Panel    Wound cellulitis     Patient was the urgent care on 10/21/2022 for large left shin wound infection.  She has been taking Augmentin, doxycycline with improvement.  The margins of the erythema are receding inside of the marker line.  I would like to see her back in 2 weeks to monitor this.  Continue antibiotics start Rybelsus for blood sugar control.          Other Visit Diagnoses       Need for vaccination        Relevant Orders    Influenza Vaccine, High Dose (65+ Only) (Completed)             Return in about 4 weeks (around 11/21/2023) for labs, diabetes, blood pressure.

## 2023-10-30 ENCOUNTER — TELEPHONE (OUTPATIENT)
Dept: MEDICAL GROUP | Facility: PHYSICIAN GROUP | Age: 78
End: 2023-10-30
Payer: MEDICARE

## 2023-10-30 NOTE — TELEPHONE ENCOUNTER
Pt was seen at  on 10/21 and was prescribed doxycycline for a lesion. Per pt lesion is only group home healed and she would like to know if you could prescribe her an extended amount to help get rid of the lesion completely.   Pt would like a call back.

## 2023-11-06 PROCEDURE — RXMED WILLOW AMBULATORY MEDICATION CHARGE: Performed by: STUDENT IN AN ORGANIZED HEALTH CARE EDUCATION/TRAINING PROGRAM

## 2023-11-07 ENCOUNTER — PHARMACY VISIT (OUTPATIENT)
Dept: PHARMACY | Facility: MEDICAL CENTER | Age: 78
End: 2023-11-07
Payer: COMMERCIAL

## 2023-11-09 ENCOUNTER — OFFICE VISIT (OUTPATIENT)
Dept: MEDICAL GROUP | Facility: PHYSICIAN GROUP | Age: 78
End: 2023-11-09
Payer: MEDICARE

## 2023-11-09 VITALS
TEMPERATURE: 96.6 F | HEIGHT: 62 IN | RESPIRATION RATE: 16 BRPM | BODY MASS INDEX: 29.26 KG/M2 | HEART RATE: 72 BPM | DIASTOLIC BLOOD PRESSURE: 60 MMHG | SYSTOLIC BLOOD PRESSURE: 112 MMHG | OXYGEN SATURATION: 99 %

## 2023-11-09 DIAGNOSIS — L03.90 WOUND CELLULITIS: ICD-10-CM

## 2023-11-09 PROCEDURE — 99213 OFFICE O/P EST LOW 20 MIN: CPT | Performed by: STUDENT IN AN ORGANIZED HEALTH CARE EDUCATION/TRAINING PROGRAM

## 2023-11-09 PROCEDURE — 3074F SYST BP LT 130 MM HG: CPT | Performed by: STUDENT IN AN ORGANIZED HEALTH CARE EDUCATION/TRAINING PROGRAM

## 2023-11-09 PROCEDURE — 3078F DIAST BP <80 MM HG: CPT | Performed by: STUDENT IN AN ORGANIZED HEALTH CARE EDUCATION/TRAINING PROGRAM

## 2023-11-09 RX ORDER — SULFAMETHOXAZOLE AND TRIMETHOPRIM 800; 160 MG/1; MG/1
1 TABLET ORAL 2 TIMES DAILY
Qty: 20 TABLET | Refills: 0 | Status: SHIPPED | OUTPATIENT
Start: 2023-11-09 | End: 2023-11-19

## 2023-11-09 NOTE — ASSESSMENT & PLAN NOTE
3 cm scabbed over healed wound with 12 cm area of erythema.  This is improved since her last visit.    Plan:  Continue to keep area clean, dry  We will Rx Bactrim if area of erythema starts to worsen or develop signs of discharge, swelling and significant tenderness.    Follow-up next week

## 2023-11-09 NOTE — PROGRESS NOTES
HISTORY OF PRESENT ILLNESS: Zenaida SCTOT is a pleasant 77 y.o. female, established patient who presents today to discuss medical problems as listed below:    Problem   Wound Cellulitis    Patient here to follow-up on her wound infection.  She went to urgent care on 10/21/2022 for a left shin wound infection, she was prescribed Augmentin and doxycycline with improvement.  At that time I seen her last week the erythema was receding inside the margins of the marker line.  She reports today that she has finished her antibiotic regimen but feels like the redness is still there.  She denies any fevers, pain, discharge, erythema.  Otherwise feels well          Current Outpatient Medications Ordered in Epic   Medication Sig Dispense Refill    sulfamethoxazole-trimethoprim (BACTRIM DS) 800-160 MG tablet Take 1 Tablet by mouth 2 times a day for 10 days. 20 Tablet 0    Semaglutide (RYBELSUS) 3 MG Tab Take 1 tablet (3 mg) by mouth every day. For 30 days. 30 Tablet 0    Semaglutide (RYBELSUS) 7 MG Tab Take1 tablet (7 mg) by mouth every day. Start after 30 days of 3mg dose. 60 Tablet 1    therapeutic multivitamin-minerals (THERAGRAN-M) Tab Take 1 Tablet by mouth every day.      amLODIPine (NORVASC) 10 MG Tab Take 1 Tablet by mouth every day. 100 Tablet 1    atenolol (TENORMIN) 50 MG Tab Take 1 Tablet by mouth every day. 100 Tablet 1    Empagliflozin (JARDIANCE) 25 MG Tab Take 1 tab (25 mg) by mouth every day. 100 Tablet 1    glimepiride (AMARYL) 4 MG Tab Take 1 Tablet by mouth every morning. 100 Tablet 0    gabapentin (NEURONTIN) 100 MG Cap Take 1 Capsule by mouth 3 times a day. (Patient taking differently: Take 10 mg by mouth every day.) 90 Capsule 0    lisinopril (PRINIVIL) 40 MG tablet Take 1 Tablet by mouth every day. 100 Tablet 1    metFORMIN (GLUCOPHAGE) 500 MG Tab Take 2 Tablets by mouth 2 times a day with meals. 100 Tablet 13    pravastatin (PRAVACHOL) 10 MG Tab TAKE 1 TABLET DAILY 90 Tablet 3    vitamin D (CHOLECALCIFEROL)  1000 UNIT Tab Take 1,000 Units by mouth every day.      glucose blood strip 1 Each by Other route as needed.      STOOL SOFTENER PO Take 1 Tab by mouth every day.       No current [facility]-ordered facility-administered medications on file.       Review of systems:  Per HPI    Patient Active Problem List    Diagnosis Date Noted    ELICEO (acute kidney injury) (HCC) 10/24/2023    Wound cellulitis 10/24/2023    History of lumbar fusion 08/15/2023    Sciatic nerve pain, right 08/15/2023    On economic assistance program 05/24/2023    Healthcare maintenance 06/01/2021    Dyslipidemia 11/19/2019    Type 2 diabetes mellitus with diabetic neuropathy, without long-term current use of insulin (HCC)     Neuropathy 01/17/2019    Vitamin D deficiency 01/17/2019    Obesity (BMI 30-39.9) 07/17/2018    Benign essential HTN 05/21/2018     Past Surgical History:   Procedure Laterality Date    FUSION, SPINE, LUMBAR, PLIF  1/11/2010    Performed by LYNNETTE SMILEY at SURGERY [facility]    LAMINOTOMY  1/11/2010    Performed by LYNNETTE SMILEY at SURGERY [facility]    LUMBAR LAMINECTOMY DISKECTOMY  8/31/2009    OTHER ORTHOPEDIC SURGERY  1989    tibia has a tanna knee to ankle, fibula is resected    PRIMARY C SECTION  1988    OTHER  1975    left lumpectomt in breast - benign    OTHER       Social History     Tobacco Use    Smoking status: Never    Smokeless tobacco: Never   Vaping Use    Vaping Use: Never used   Substance Use Topics    Alcohol use: No    Drug use: No      Family History   Adopted: Yes     Current Outpatient Medications   Medication Sig Dispense Refill    sulfamethoxazole-trimethoprim (BACTRIM DS) 800-160 MG tablet Take 1 Tablet by mouth 2 times a day for 10 days. 20 Tablet 0    Semaglutide (RYBELSUS) 3 MG Tab Take 1 tablet (3 mg) by mouth every day. For 30 days. 30 Tablet 0    Semaglutide (RYBELSUS) 7 MG Tab Take1 tablet (7 mg) by mouth every day. Start after 30 days of 3mg dose. 60 Tablet 1    therapeutic  "multivitamin-minerals (THERAGRAN-M) Tab Take 1 Tablet by mouth every day.      amLODIPine (NORVASC) 10 MG Tab Take 1 Tablet by mouth every day. 100 Tablet 1    atenolol (TENORMIN) 50 MG Tab Take 1 Tablet by mouth every day. 100 Tablet 1    Empagliflozin (JARDIANCE) 25 MG Tab Take 1 tab (25 mg) by mouth every day. 100 Tablet 1    glimepiride (AMARYL) 4 MG Tab Take 1 Tablet by mouth every morning. 100 Tablet 0    gabapentin (NEURONTIN) 100 MG Cap Take 1 Capsule by mouth 3 times a day. (Patient taking differently: Take 10 mg by mouth every day.) 90 Capsule 0    lisinopril (PRINIVIL) 40 MG tablet Take 1 Tablet by mouth every day. 100 Tablet 1    metFORMIN (GLUCOPHAGE) 500 MG Tab Take 2 Tablets by mouth 2 times a day with meals. 100 Tablet 13    pravastatin (PRAVACHOL) 10 MG Tab TAKE 1 TABLET DAILY 90 Tablet 3    vitamin D (CHOLECALCIFEROL) 1000 UNIT Tab Take 1,000 Units by mouth every day.      glucose blood strip 1 Each by Other route as needed.      STOOL SOFTENER PO Take 1 Tab by mouth every day.       No current facility-administered medications for this visit.       Allergies:  Allergies   Allergen Reactions    Percocet [Oxycodone-Acetaminophen] Swelling    Demerol     Statins [Hmg-Coa-R Inhibitors]        Allergies, past medical history, past surgical history, family history, social history reviewed and updated.    Objective:    /60 (BP Location: Right arm, Patient Position: Sitting, BP Cuff Size: Adult)   Pulse 72   Temp 35.9 °C (96.6 °F) (Temporal)   Resp 16   Ht 1.575 m (5' 2\")   SpO2 99%   BMI 29.26 kg/m²    Body mass index is 29.26 kg/m².    Physical exam:  General: Normal appearance, no acute distress, not ill-appearing  HEENT: EOM intact, conjunctiva normal limits, negative right/left eye discharge.  Sclera anicteric  Musculoskeletal: No edema bilaterally  Skin: Left shin 3 cm diameter wound scabbed over, healing with no purulent drainage or discharge or tenderness palpation.  12 cm in diameter " area of erythema around the wound.  No tenderness palpation or significant warmth  Neurological: No focal deficits, normal gait  Psychiatric: Mood within normal limits    Assessment/Plan:    Problem List Items Addressed This Visit       Wound cellulitis     3 cm scabbed over healed wound with 12 cm area of erythema.  This is improved since her last visit.    Plan:  Continue to keep area clean, dry  We will Rx Bactrim if area of erythema starts to worsen or develop signs of discharge, swelling and significant tenderness.    Follow-up next week         Relevant Medications    sulfamethoxazole-trimethoprim (BACTRIM DS) 800-160 MG tablet        Return in about 1 week (around 11/16/2023) for AWV, symptoms.

## 2023-11-16 DIAGNOSIS — Z76.0 MEDICATION REFILL: ICD-10-CM

## 2023-11-16 DIAGNOSIS — I10 BENIGN ESSENTIAL HTN: ICD-10-CM

## 2023-11-16 DIAGNOSIS — E78.5 DYSLIPIDEMIA: ICD-10-CM

## 2023-11-16 DIAGNOSIS — E78.2 MIXED HYPERLIPIDEMIA: ICD-10-CM

## 2023-11-16 PROCEDURE — RXMED WILLOW AMBULATORY MEDICATION CHARGE: Performed by: STUDENT IN AN ORGANIZED HEALTH CARE EDUCATION/TRAINING PROGRAM

## 2023-11-16 RX ORDER — PRAVASTATIN SODIUM 10 MG
10 TABLET ORAL DAILY
Qty: 100 TABLET | Refills: 3 | Status: SHIPPED | OUTPATIENT
Start: 2023-11-16 | End: 2024-02-16 | Stop reason: SDUPTHER

## 2023-11-16 RX ORDER — LISINOPRIL 40 MG/1
40 TABLET ORAL DAILY
Qty: 100 TABLET | Refills: 1 | Status: SHIPPED | OUTPATIENT
Start: 2023-11-16 | End: 2024-02-16 | Stop reason: SDUPTHER

## 2023-11-17 ENCOUNTER — PHARMACY VISIT (OUTPATIENT)
Dept: PHARMACY | Facility: MEDICAL CENTER | Age: 78
End: 2023-11-17
Payer: COMMERCIAL

## 2023-11-29 ENCOUNTER — OFFICE VISIT (OUTPATIENT)
Dept: MEDICAL GROUP | Facility: PHYSICIAN GROUP | Age: 78
End: 2023-11-29
Payer: MEDICARE

## 2023-11-29 VITALS
HEART RATE: 83 BPM | BODY MASS INDEX: 29.26 KG/M2 | DIASTOLIC BLOOD PRESSURE: 76 MMHG | SYSTOLIC BLOOD PRESSURE: 124 MMHG | TEMPERATURE: 96.8 F | RESPIRATION RATE: 14 BRPM | OXYGEN SATURATION: 97 % | HEIGHT: 62 IN

## 2023-11-29 DIAGNOSIS — H91.90 HEARING LOSS, UNSPECIFIED HEARING LOSS TYPE, UNSPECIFIED LATERALITY: ICD-10-CM

## 2023-11-29 DIAGNOSIS — K08.9 POOR DENTITION: ICD-10-CM

## 2023-11-29 DIAGNOSIS — Z00.00 HEALTHCARE MAINTENANCE: ICD-10-CM

## 2023-11-29 DIAGNOSIS — E11.40 TYPE 2 DIABETES MELLITUS WITH DIABETIC NEUROPATHY, WITHOUT LONG-TERM CURRENT USE OF INSULIN (HCC): ICD-10-CM

## 2023-11-29 DIAGNOSIS — N17.9 AKI (ACUTE KIDNEY INJURY) (HCC): ICD-10-CM

## 2023-11-29 DIAGNOSIS — T14.8XXD WOUND HEALING, DELAYED: ICD-10-CM

## 2023-11-29 PROCEDURE — 3078F DIAST BP <80 MM HG: CPT | Performed by: STUDENT IN AN ORGANIZED HEALTH CARE EDUCATION/TRAINING PROGRAM

## 2023-11-29 PROCEDURE — 3074F SYST BP LT 130 MM HG: CPT | Performed by: STUDENT IN AN ORGANIZED HEALTH CARE EDUCATION/TRAINING PROGRAM

## 2023-11-29 PROCEDURE — 99213 OFFICE O/P EST LOW 20 MIN: CPT | Mod: 25 | Performed by: STUDENT IN AN ORGANIZED HEALTH CARE EDUCATION/TRAINING PROGRAM

## 2023-11-29 PROCEDURE — G0439 PPPS, SUBSEQ VISIT: HCPCS | Performed by: STUDENT IN AN ORGANIZED HEALTH CARE EDUCATION/TRAINING PROGRAM

## 2023-11-29 ASSESSMENT — ACTIVITIES OF DAILY LIVING (ADL): BATHING_REQUIRES_ASSISTANCE: 0

## 2023-11-29 ASSESSMENT — ENCOUNTER SYMPTOMS: GENERAL WELL-BEING: GOOD

## 2023-11-29 ASSESSMENT — PATIENT HEALTH QUESTIONNAIRE - PHQ9: CLINICAL INTERPRETATION OF PHQ2 SCORE: 0

## 2023-11-29 NOTE — ASSESSMENT & PLAN NOTE
Last gf 47, cr 1.19  Repeat bmp ordered last visit but she has not done this yet. Advised to follow up on this. Stay well hydrated

## 2023-11-29 NOTE — ASSESSMENT & PLAN NOTE
Dm retinal exam today    A1c 9.4, was started on rybelsus last month 10/23. Check A1c again 01/2024.     Morning blood sugars are from .   Declines CGM. Seeing pharmacist next week

## 2023-11-29 NOTE — PROGRESS NOTES
Chief Complaint   Patient presents with    Annual Exam     AWV    Medication Refill     Gabapentin        HPI:  Zenaida Rios is a 78 y.o. here for Medicare Annual Wellness Visit     Patient Active Problem List    Diagnosis Date Noted    Wound healing, delayed 11/29/2023    Poor dentition 11/29/2023    ELICEO (acute kidney injury) (HCC) 10/24/2023    Wound cellulitis 10/24/2023    History of lumbar fusion 08/15/2023    Sciatic nerve pain, right 08/15/2023    On economic assistance program 05/24/2023    Healthcare maintenance 06/01/2021    Dyslipidemia 11/19/2019    Type 2 diabetes mellitus with diabetic neuropathy, without long-term current use of insulin (HCC)     Neuropathy 01/17/2019    Vitamin D deficiency 01/17/2019    Obesity (BMI 30-39.9) 07/17/2018    Benign essential HTN 05/21/2018       Current Outpatient Medications   Medication Sig Dispense Refill    lisinopril (PRINIVIL) 40 MG tablet Take 1 Tablet by mouth every day. 100 Tablet 1    pravastatin (PRAVACHOL) 10 MG Tab Take 1 tablet by mouth daily 100 Tablet 3    Semaglutide (RYBELSUS) 7 MG Tab Take1 tablet (7 mg) by mouth every day. Start after 30 days of 3mg dose. 60 Tablet 1    therapeutic multivitamin-minerals (THERAGRAN-M) Tab Take 1 Tablet by mouth every day.      amLODIPine (NORVASC) 10 MG Tab Take 1 Tablet by mouth every day. 100 Tablet 1    atenolol (TENORMIN) 50 MG Tab Take 1 Tablet by mouth every day. 100 Tablet 1    Empagliflozin (JARDIANCE) 25 MG Tab Take 1 tab (25 mg) by mouth every day. 100 Tablet 1    glimepiride (AMARYL) 4 MG Tab Take 1 Tablet by mouth every morning. 100 Tablet 0    gabapentin (NEURONTIN) 100 MG Cap Take 1 Capsule by mouth 3 times a day. (Patient taking differently: Take 10 mg by mouth every day.) 90 Capsule 0    metFORMIN (GLUCOPHAGE) 500 MG Tab Take 2 Tablets by mouth 2 times a day with meals. 100 Tablet 13    vitamin D (CHOLECALCIFEROL) 1000 UNIT Tab Take 1,000 Units by mouth every day.      glucose blood strip 1  Each by Other route as needed.      STOOL SOFTENER PO Take 1 Tab by mouth every day.       No current facility-administered medications for this visit.          Current supplements as per medication list.     Allergies: Percocet [oxycodone-acetaminophen], Demerol, and Statins [hmg-coa-r inhibitors]    Current social contact/activities: shopping. No social gatherings      She  reports that she has never smoked. She has never used smokeless tobacco. She reports that she does not drink alcohol and does not use drugs.  Counseling given: Not Answered      ROS:    Gait: Uses no assistive device  Ostomy: No  Other tubes: No  Amputations: No  Chronic oxygen use: No  Last eye exam: 2 months ago - eyes on nadia   Wears hearing aids: No   : Denies any urinary leakage during the last 6 months    Screening:    Retinal screening exam records requested   Declines DEXA scan screening    Depression Screening  Little interest or pleasure in doing things?  0 - not at all  Feeling down, depressed , or hopeless? 0 - not at all  Patient Health Questionnaire Score: 0     If depressive symptoms identified deferred to follow up visit unless specifically addressed in assessment and plan.    Interpretation of PHQ-9 Total Score   Score Severity   1-4 No Depression   5-9 Mild Depression   10-14 Moderate Depression   15-19 Moderately Severe Depression   20-27 Severe Depression    Screening for Cognitive Impairment  Do you or any of your friends or family members have any concern about your memory? No  Three Minute Recall (Banana, Sunrise, Chair) 3/3    Gerhard clock face with all 12 numbers and set the hands to show 20 past 8.  Yes    Cognitive concerns identified deferred for follow up unless specifically addressed in assessment and plan.    Fall Risk Assessment  Has the patient had two or more falls in the last year or any fall with injury in the last year?  No    Safety Assessment  Do you always wear your seatbelt?  Yes  Any changes to home  needed to function safely? No  Difficulty hearing.  Yes  Patient counseled about all safety risks that were identified.    Functional Assessment ADLs  Are there any barriers preventing you from cooking for yourself or meeting nutritional needs?  No.    Are there any barriers preventing you from driving safely or obtaining transportation?  No.    Are there any barriers preventing you from using a telephone or calling for help?  No    Are there any barriers preventing you from shopping?  No.    Are there any barriers preventing you from taking care of your own finances?  No    Are there any barriers preventing you from managing your medications?  No    Are there any barriers preventing you from showering, bathing or dressing yourself? No    Are there any barriers preventing you from doing housework or laundry? No  Are there any barriers preventing you from using the toilet?No  Are you currently engaging in any exercise or physical activity?  Yes.      Self-Assessment of Health  What is your perception of your health? Good  Do you sleep more than six hours a night? Yes  In the past 7 days, how much did pain keep you from doing your normal work? None  Do you spend quality time with family or friends (virtually or in person)? Yes  Do you usually eat a heart healthy diet that constists of a variety of fruits, vegetables, whole grains and fiber? Yes  Do you eat foods high in fat and/or Fast Food more than three times per week? No    Advance Care Planning  Do you have an Advance Directive, Living Will, Durable Power of , or POLST? Yes    Living Will            Health Maintenance Summary            Overdue - Diabetes: Retinopathy Screening (Yearly) Overdue since 5/22/2019 05/22/2018  REFERRAL FOR RETINAL SCREENING EXAM              Overdue - COVID-19 Vaccine (3 - Moderna series) Overdue since 9/30/2022 08/05/2022  Imm Admin: MODERNA SARS-COV-2 VACCINE (12+)    07/08/2022  Imm Admin: MODERNA SARS-COV-2  VACCINE (12+)              Postponed - Zoster (Shingles) Vaccines (1 of 2) Postponed until 3/24/2024      No completion history exists for this topic.              Postponed - Bone Density Scan (Every 5 Years) Postponed until 11/9/2024      No completion history exists for this topic.              A1c Screening (Every 6 Months) Next due on 2/29/2024 08/30/2023  POCT Hemoglobin A1C    05/24/2023  POCT  A1C    02/22/2023  POCT  A1C    11/17/2022  POCT  A1C    08/11/2022  HEMOGLOBIN A1C    Only the first 5 history entries have been loaded, but more history exists.              Diabetes: Monofilament / LE Exam (Yearly) Next due on 8/15/2024      08/15/2023  Diabetic Monofilament LE Exam    08/15/2023  [employer]: WORKFLOW - DIABETES - DIABETIC FOOT EXAM PERFORMED    04/21/2022  Diabetic Monofilament LE Exam    02/26/2021  Diabetic Monofilament Lower Extremity Exam    07/18/2019  Diabetic Monofilament LE Exam    Only the first 5 history entries have been loaded, but more history exists.              Fasting Lipid Profile (Yearly) Next due on 10/3/2024      10/03/2023  LIPID PROFILE    08/11/2022  LIPID PROFILE    07/15/2021  Lipid Profile    07/16/2020  Lipid Profile    07/15/2019  Lipid Profile    Only the first 5 history entries have been loaded, but more history exists.              Diabetes: Urine Protein Screening (Yearly) Next due on 10/3/2024      10/03/2023  MICROALBUMIN CREAT RATIO URINE    07/20/2021  MICROALBUMIN CREAT RATIO URINE    07/15/2019  MICROALBUMIN CREAT RATIO URINE              Ordered - SERUM CREATININE (Yearly) Ordered on 10/24/2023      10/03/2023  COMP METABOLIC PANEL    08/11/2022  COMP METABOLIC PANEL    07/15/2021  Comp Metabolic Panel    07/16/2020  Comp Metabolic Panel    11/19/2019  Basic Metabolic Panel    Only the first 5 history entries have been loaded, but more history exists.              Annual Wellness Visit (Every 366 Days) Next due on 11/29/2024 11/29/2023  Level of  Service: ANNUAL WELLNESS VISIT-INCLUDES PPPS SUBSEQUE*    2022  Done              IMM DTaP/Tdap/Td Vaccine (2 - Td or Tdap) Next due on 10/16/2028      10/16/2018  Imm Admin: Tdap Vaccine              Pneumococcal Vaccine: 65+ Years (Series Information) Completed      2018  Imm Admin: Pneumococcal Conjugate Vaccine (Prevnar/PCV-13)    10/16/2017  Imm Admin: Pneumococcal polysaccharide vaccine (PPSV-23)              Hepatitis C Screening  Tentatively Complete      2020  Hepatitis C Antibody component of HCV Scrn ( 7737-7688 1xLife)              Influenza Vaccine (Series Information) Completed      10/24/2023  Imm Admin: Influenza Vaccine Adult HD    2022  Imm Admin: Influenza Vaccine Adult HD    10/13/2021  Imm Admin: Influenza Vaccine Adult HD    10/07/2020  Imm Admin: Influenza Vaccine Quad Inj (Pf)    10/16/2018  Imm Admin: Influenza Vaccine Adult HD              Hepatitis A Vaccine (Hep A) (Series Information) Aged Out      No completion history exists for this topic.              HPV Vaccines (Series Information) Aged Out      No completion history exists for this topic.              Polio Vaccine (Inactivated Polio) (Series Information) Aged Out      No completion history exists for this topic.              Meningococcal Immunization (Series Information) Aged Out      No completion history exists for this topic.                    Patient Care Team:  Bakari Berumen D.O. as PCP - General (Family Medicine)        Social History     Tobacco Use    Smoking status: Never    Smokeless tobacco: Never   Vaping Use    Vaping Use: Never used   Substance Use Topics    Alcohol use: No    Drug use: No     Family History   Adopted: Yes     She  has a past medical history of Acute left-sided low back pain with left-sided sciatica (9/3/2019), ELICEO (acute kidney injury) (HCC) (10/24/2023), Decreased GFR (2019), Healthcare maintenance (2021), Leg weakness, bilateral (9/3/2019), and Medication  "refill (3/20/2020).    She has no past medical history of Angina, Arrhythmia, Arthritis, ASTHMA, Bronchitis, Cancer ([facility]), CATARACT, Congestive heart failure ([facility]), COPD, Dialysis, Glaucoma, Heart valve disease, Indigestion, Infectious disease, Jaundice, Myocardial infarct ([facility]), Other specified symptom associated with female genital organs, Pacemaker, Personal history of venous thrombosis and embolism, Pneumonia, Psychiatric problem, Rheumatic fever, Seizure ([facility]), Stroke ([facility]), Unspecified disorder of thyroid, Unspecified hemorrhagic conditions, or Unspecified urinary incontinence.   Past Surgical History:   Procedure Laterality Date    FUSION, SPINE, LUMBAR, PLIF  1/11/2010    Performed by LYNNETTE SMILEY at SURGERY [facility]    LAMINOTOMY  1/11/2010    Performed by LYNNETTE SMILEY at SURGERY [facility]    LUMBAR LAMINECTOMY DISKECTOMY  8/31/2009    OTHER ORTHOPEDIC SURGERY  1989    tibia has a tanna knee to ankle, fibula is resected    PRIMARY C SECTION  1988    OTHER  1975    left lumpectomt in breast - benign    OTHER         Exam:   /76 (BP Location: Left arm, Patient Position: Sitting, BP Cuff Size: Adult)   Pulse 83   Temp 36 °C (96.8 °F) (Temporal)   Resp 14   Ht 1.575 m (5' 2\")   SpO2 97%  Body mass index is 29.26 kg/m².    Hearing poor.    Dentition poor  Alert, oriented in no acute distress.  Eye contact is good, speech goal directed, affect calm    L shin: 5 cm diameter wound dry scabbed with area of 2.5 cm of erythema around. No tenderness, white discharge when pressing wound.     Assessment and Plan. The following treatment and monitoring plan is recommended:      Problem List Items Addressed This Visit       Type 2 diabetes mellitus with diabetic neuropathy, without long-term current use of insulin ([facility])     Dm retinal exam today    A1c 9.4, was started on rybelsus last month 10/23. Check A1c again 01/2024.     Morning blood sugars are from .   Declines CGM. Seeing " pharmacist next week          Healthcare maintenance     Recommended COVID booster, RSV vaccine, Shingrix vaccination    Declines DEXA screening         ELICEO (acute kidney injury) (HCC)     Last gf 47, cr 1.19  Repeat bmp ordered last visit but she has not done this yet. Advised to follow up on this. Stay well hydrated         Wound healing, delayed     L shin, this is turning into a chronic wound now. She picked up bactrim 2 weeks. Still having some redness, no pain. Some slight discharge white if pushed on.  So far she has been on Augmentin, doxycycline and now Bactrim.    Will send for wound clinic for monitoring.   Rtc if worsening, follow-up 2 weeks           Relevant Orders    Referral to Wound Clinic    Poor dentition     Other Visit Diagnoses       Hearing loss, unspecified hearing loss type, unspecified laterality        Relevant Orders    Referral to Audiology            Services suggested: No services needed at this time  Health Care Screening: Age-appropriate preventive services recommended by USPTF and ACIP covered by Medicare were discussed today. Services ordered if indicated and agreed upon by the patient.  Referrals offered: Community-based lifestyle interventions to reduce health risks and promote self-management and wellness, fall prevention, nutrition, physical activity, tobacco-use cessation, weight loss, and mental health services as per orders if indicated.    Discussion today about general wellness and lifestyle habits:    Prevent falls and reduce trip hazards; Cautioned about securing or removing rugs.  Have a working fire alarm and carbon monoxide detector;   Engage in regular physical activity and social activities     Follow-up: Return in about 2 weeks (around 12/13/2023), or if symptoms worsen or fail to improve.

## 2023-11-29 NOTE — LETTER
[facility]  Bakari Berumen D.O.  [address]  [facility] [address]  Fax: [phone]   Authorization for Release/Disclosure of   Protected Health Information   Name: ZENAIDA SCOTT PATRICE : 1945 SSN: [id number]   Address: [address] [place] [id number] Phone:    [phone] (home)    I authorize the entity listed below to release/disclose the PHI below to:   [facility]/Bakari Berumen D.O. and Bakari Berumen D.O.   Provider or Entity Name:  [facility]   City, State, Zip   Phone:      Fax:     Reason for request: continuity of care   Information to be released:    [  ] LAST COLONOSCOPY,  including any PATH REPORT and follow-up  [  ] LAST FIT/COLOGUARD RESULT [  ] LAST DEXA  [  ] LAST MAMMOGRAM  [  ] LAST PAP  [  ] LAST LABS [  X ] RETINA EXAM REPORT  [  ] IMMUNIZATION RECORDS  [  ] Release all info      [  ] Check here and initial the line next to each item to release ALL health information INCLUDING  _____ Care and treatment for drug and / or alcohol abuse  _____ HIV testing, infection status, or AIDS  _____ Genetic Testing    DATES OF SERVICE OR TIME PERIOD TO BE DISCLOSED: _____________  I understand and acknowledge that:  * This Authorization may be revoked at any time by you in writing, except if your health information has already been used or disclosed.  * Your health information that will be used or disclosed as a result of you signing this authorization could be re-disclosed by the recipient. If this occurs, your re-disclosed health information may no longer be protected by State or Federal laws.  * You may refuse to sign this Authorization. Your refusal will not affect your ability to obtain treatment.  * This Authorization becomes effective upon signing and will  on (date) __________.      If no date is indicated, this Authorization will  one (1) year from the signature date.    Name: Zenaida SCOTT Patrice  Signature: Date:    11/29/2023     PLEASE FAX REQUESTED RECORDS BACK TO: [phone]

## 2023-11-29 NOTE — ASSESSMENT & PLAN NOTE
L shin, this is turning into a chronic wound now. She picked up bactrim 2 weeks. Still having some redness, no pain. Some slight discharge white if pushed on.  So far she has been on Augmentin, doxycycline and now Bactrim.    Will send for wound clinic for monitoring.   Rtc if worsening, follow-up 2 weeks

## 2023-11-30 ENCOUNTER — OFFICE VISIT (OUTPATIENT)
Dept: URGENT CARE | Facility: CLINIC | Age: 78
End: 2023-11-30
Payer: MEDICARE

## 2023-11-30 VITALS
BODY MASS INDEX: 27.38 KG/M2 | TEMPERATURE: 98.4 F | SYSTOLIC BLOOD PRESSURE: 124 MMHG | HEART RATE: 86 BPM | DIASTOLIC BLOOD PRESSURE: 74 MMHG | RESPIRATION RATE: 16 BRPM | HEIGHT: 61 IN | WEIGHT: 145 LBS | OXYGEN SATURATION: 97 %
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
## 2023-11-30 DIAGNOSIS — R29.898 RIGHT LEG WEAKNESS: ICD-10-CM

## 2023-11-30 DIAGNOSIS — M79.604 RIGHT LEG PAIN: ICD-10-CM

## 2023-11-30 PROCEDURE — 3078F DIAST BP <80 MM HG: CPT | Performed by: STUDENT IN AN ORGANIZED HEALTH CARE EDUCATION/TRAINING PROGRAM

## 2023-11-30 PROCEDURE — 99215 OFFICE O/P EST HI 40 MIN: CPT | Performed by: STUDENT IN AN ORGANIZED HEALTH CARE EDUCATION/TRAINING PROGRAM

## 2023-11-30 PROCEDURE — 3074F SYST BP LT 130 MM HG: CPT | Performed by: STUDENT IN AN ORGANIZED HEALTH CARE EDUCATION/TRAINING PROGRAM

## 2023-11-30 ASSESSMENT — ENCOUNTER SYMPTOMS
WEAKNESS: 1
BACK PAIN: 0
SENSORY CHANGE: 1
FEVER: 0
CHILLS: 0
NECK PAIN: 0

## 2023-11-30 NOTE — PROGRESS NOTES
"Subjective     Zenaida Rios is a 78 y.o. female who presents with Back Pain (Back pain, R leg pain, fell twice this morning, weakness in both legs x 2 weeks )            Zenaida is a 78 y.o. female who presents to urgent care with right leg pain/weakness.  Patient states that this morning she fell/collapsed twice.  Patient states she was just standing and fell over.  No LOC.  No head injury.  Patient states that her leg just felt weak and gave out causing her to fall.  Patient has had bilateral leg weakness for the last 2 weeks.  Patient states she has started to use her walker due to leg weakness.  Patient reports prior spinal surgery years ago.  No bowel/bladder incontinence.  No injury or trauma.  Since fall her right leg pain has increased.         Review of Systems   Constitutional:  Negative for chills, fever and malaise/fatigue.   Musculoskeletal:  Negative for back pain and neck pain.   Neurological:  Positive for sensory change and weakness (right leg).   All other systems reviewed and are negative.             Objective     /74 (BP Location: Left arm, Patient Position: Sitting, BP Cuff Size: Adult)   Pulse 86   Temp 36.9 °C (98.4 °F)   Resp 16   Ht 1.549 m (5' 1\")   Wt 65.8 kg (145 lb)   SpO2 97%   BMI 27.40 kg/m²      Physical Exam  Vitals reviewed.   Constitutional:       General: She is not in acute distress.     Appearance: Normal appearance. She is not toxic-appearing.   HENT:      Head: Normocephalic and atraumatic.      Nose: Nose normal.   Eyes:      Extraocular Movements: Extraocular movements intact.      Conjunctiva/sclera: Conjunctivae normal.      Pupils: Pupils are equal, round, and reactive to light.   Cardiovascular:      Rate and Rhythm: Normal rate and regular rhythm.   Pulmonary:      Effort: Pulmonary effort is normal.      Breath sounds: Normal breath sounds.   Musculoskeletal:      Cervical back: Normal.      Thoracic back: Normal.      Lumbar back: No swelling, " "tenderness or bony tenderness.      Right upper leg: Normal.      Left upper leg: Normal.      Right knee: No swelling. Normal range of motion. Normal pulse.      Left knee: No swelling. Normal range of motion. Normal pulse.      Right lower leg: No swelling. No edema.      Left lower leg: No swelling. No edema.      Right ankle: Normal.      Left ankle: Normal.   Neurological:      General: No focal deficit present.      Mental Status: She is alert and oriented to person, place, and time.      GCS: GCS eye subscore is 4. GCS verbal subscore is 5. GCS motor subscore is 6.      Cranial Nerves: Cranial nerves 2-12 are intact.      Sensory: Sensation is intact.      Motor: Motor function is intact.      Coordination: Coordination is intact.      Gait: Gait abnormal.                             Assessment & Plan        1. Fall at home, initial encounter    2. Right leg weakness    3. Right leg pain    Patient presents to urgent care for right leg weakness/leg pain.  Symptom onset today.  Right leg weakness caused her to collapse/fall twice while at home today.  Patient states she did not trip and fall but her leg felt weak and \"gave out\" on her causing her to \"collapse.\"  Patient has been using a walker because she has been experiencing bilateral leg weakness for approx 2 weeks.  Patient reports prior spinal surgery years ago of L3, L4, L5 and S1.  Lumbar spine imaging in 2019 revealed stable L3-L5 fusion with multilevel degenerative changes.  Patient denies head injury or LOC today when she fell (twice).  Due to acute onset of leg weakness causing falls at home it was recommended that patient transfer to ER for further evaluation/management.  Patient is agreeable. Patient will go to [facility] ER.               "

## 2023-12-06 ENCOUNTER — NON-PROVIDER VISIT (OUTPATIENT)
Dept: MEDICAL GROUP | Facility: PHYSICIAN GROUP | Age: 78
End: 2023-12-06
Payer: MEDICARE

## 2023-12-06 DIAGNOSIS — E11.40 TYPE 2 DIABETES MELLITUS WITH DIABETIC NEUROPATHY, WITHOUT LONG-TERM CURRENT USE OF INSULIN (HCC): Primary | ICD-10-CM

## 2023-12-06 DIAGNOSIS — E11.40 TYPE 2 DIABETES MELLITUS WITH DIABETIC NEUROPATHY, WITHOUT LONG-TERM CURRENT USE OF INSULIN (HCC): ICD-10-CM

## 2023-12-06 LAB
HBA1C MFR BLD: 9.7 % (ref ?–5.8)
POCT INT CON NEG: NEGATIVE
POCT INT CON POS: POSITIVE

## 2023-12-06 PROCEDURE — 83036 HEMOGLOBIN GLYCOSYLATED A1C: CPT | Performed by: INTERNAL MEDICINE

## 2023-12-06 PROCEDURE — 99211 OFF/OP EST MAY X REQ PHY/QHP: CPT | Performed by: STUDENT IN AN ORGANIZED HEALTH CARE EDUCATION/TRAINING PROGRAM

## 2023-12-06 RX ORDER — INSULIN GLARGINE-YFGN 100 [IU]/ML
INJECTION, SOLUTION SUBCUTANEOUS
Qty: 20 ML | Refills: 1 | Status: SHIPPED | OUTPATIENT
Start: 2023-12-06

## 2023-12-06 RX ORDER — INSULIN GLARGINE-YFGN 100 [IU]/ML
INJECTION, SOLUTION SUBCUTANEOUS
Refills: 1 | Status: CANCELLED | OUTPATIENT
Start: 2023-12-06

## 2023-12-06 RX ORDER — INSULIN GLARGINE-YFGN 100 [IU]/ML
INJECTION, SOLUTION SUBCUTANEOUS
Qty: 15 ML | Refills: 1 | Status: SHIPPED | OUTPATIENT
Start: 2023-12-06 | End: 2023-12-06

## 2023-12-06 NOTE — PATIENT INSTRUCTIONS
Inject insulin glargine 10 units daily. Increase by 2 units every 3 days until fasting blood sugar is consistently less than 130.    Please check your blood sugars every morning.  If able, please also check your blood sugars at bedtime.    Continue the following:  Metformin 1000 mg twice daily   Rybelsus 14 mg daily  Jardiance 25 mg daily  Glimepiride 4 mg daily    Please drop off income documentation at your earliest convenience

## 2023-12-06 NOTE — PROGRESS NOTES
"Patient Consult Note    TIME IN: 9:06 am  TIME OUT: 9:56 am    Primary care physician: Bakari Berumen D.O.    Reason for consult: Management of Uncontrolled Type 2 Diabetes    HPI:  Zenaida Rios is a 77 y.o. old patient who comes in today for evaluation of above stated problem.    Most Recent HbA1c and POCT glucose:   Lab Results   Component Value Date/Time    HBA1C 9.7 (A) 12/06/2023 09:30 AM        Most Recent Scr:  Lab Results   Component Value Date/Time    CREATININE 0.91 07/15/2021 07:40 AM        Current Diabetes Medication Regimen  Metformin IR/ER: 1000 mg BID   GLP-1 agonist: Rybelsus 14 mg daily -- restarted by PCP end of 10/2023; obtained from PAP  SGLT-2 Inhibitor: Jardiance 25 mg once daily -- obtained from PAP  Sulfonylurea: Glimepiride 4 mg once daily    *Patient requests assistance with PAP re-enrollment today    Previous Diabetes Medications and Reason for Discontinuation  Pioglitazone - pt states \"horrible reaction\" many years ago that she cannot recall   Rybelsus - lethargy, fatigue, and abd pain     Pt has home glucometer and proper testing technique - Yes  Discussed BG Goals: FBG 80 - 130, 2hPP < 180, A1c < 7%    Pt reports blood sugars:   Before Breakfast: 120-150  Occasionally checks other times of the day: 150-190    Hypoglycemia awareness - Yes  Nocturnal hypoglycemia- Denies  Hypoglycemia:  None    Pt's treatment of Hypoglycemia - 15:15 Rule    Current Exercise - Walks her dogs. 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 Has had more pain in her legs preventing her from exercise. Reports recent GLF.  Exercise Goal - Increase to goal of 30 min 5x/week    Dietary: unchanged from last time  Breakfast - None typically  Lunch - Cheese sticks, sandwich (PB), \"whatever is in the refigerator\"  Dinner - Cooks meals at home. Typically meat, potato, vegetables. Occasionally skips  Snacks - Cheese, crackers  Desserts - Avoids sweets mostly  Drinks - Coffee w/ creamer    Others: " reports increased stress levels    Foot Exam:  Monofilament exam - Completed 08/2023    Preventative Management  BP regimen (ACE/ARB) - Lisinopril 40 mg once daily  Statin - Pravastatin 10 mg once daily  Last Retinal Scan - Completed 09/05/23  Last Microalbumin/Cr - Due. Orders in place.  Last A1c -   Lab Results   Component Value Date/Time    HBA1C 9.7 (A) 12/06/2023 09:30 AM      Past Medical History:  Patient Active Problem List    Diagnosis Date Noted    Wound healing, delayed 11/29/2023    Poor dentition 11/29/2023    ELICEO (acute kidney injury) ([facility]) 10/24/2023    Wound cellulitis 10/24/2023    History of lumbar fusion 08/15/2023    Sciatic nerve pain, right 08/15/2023    On economic assistance program 05/24/2023    Healthcare maintenance 06/01/2021    Dyslipidemia 11/19/2019    Type 2 diabetes mellitus with diabetic neuropathy, without long-term current use of insulin ([facility])     Neuropathy 01/17/2019    Vitamin D deficiency 01/17/2019    Obesity (BMI 30-39.9) 07/17/2018    Benign essential HTN 05/21/2018       Past Surgical History:  Past Surgical History:   Procedure Laterality Date    FUSION, SPINE, LUMBAR, PLIF  1/11/2010    Performed by LYNNETTE SMILEY at SURGERY [facility]    LAMINOTOMY  1/11/2010    Performed by LYNNETTE SMILEY at SURGERY [facility]    LUMBAR LAMINECTOMY DISKECTOMY  8/31/2009    OTHER ORTHOPEDIC SURGERY  1989    tibia has a tanna knee to ankle, fibula is resected    PRIMARY C SECTION  1988    OTHER  1975    left lumpectomt in breast - benign    OTHER         Allergies:  Percocet [oxycodone-acetaminophen], Demerol, and Statins [hmg-coa-r inhibitors]    Social History:  Social History     Socioeconomic History    Marital status:      Spouse name: Not on file    Number of children: Not on file    Years of education: Not on file    Highest education level: Not on file   Occupational History    Not on file   Tobacco Use    Smoking status: Never    Smokeless tobacco: Never    Vaping Use    Vaping Use: Never used   Substance and Sexual Activity    Alcohol use: No    Drug use: No    Sexual activity: Yes     Partners: Male     Comment:    Other Topics Concern    Not on file   Social History Narrative    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[employer]      Social Determinants of Health     Financial Resource Strain: Not on file   Food Insecurity: Not on file   Transportation Needs: Not on file   Physical Activity: Not on file   Stress: Not on file   Social Connections: Not on file   Intimate Partner Violence: Not on file   Housing Stability: Not on file     Family History:  Family History   Adopted: Yes     Medications:    Current Outpatient Medications:     Insulin Glargine-yfgn (SEMGLEE, YFGN,) 100 UNIT/ML Solution Pen-injector, Inject 10 units subq daily or as directed (max 50 units/day), Disp: 15 mL, Rfl: 1    Insulin Pen Needle 32 G x 4 mm, Use one pen needle in pen device to inject insulin once daily., Disp: 100 Each, Rfl: 1    lisinopril (PRINIVIL) 40 MG tablet, Take 1 Tablet by mouth every day., Disp: 100 Tablet, Rfl: 1    pravastatin (PRAVACHOL) 10 MG Tab, Take 1 tablet by mouth daily, Disp: 100 Tablet, Rfl: 3    Semaglutide (RYBELSUS) 7 MG Tab, Take1 tablet (7 mg) by mouth every day. Start after 30 days of 3mg dose., Disp: 60 Tablet, Rfl: 1    therapeutic multivitamin-minerals (THERAGRAN-M) Tab, Take 1 Tablet by mouth every day., Disp: , Rfl:     amLODIPine (NORVASC) 10 MG Tab, Take 1 Tablet by mouth every day., Disp: 100 Tablet, Rfl: 1    atenolol (TENORMIN) 50 MG Tab, Take 1 Tablet by mouth every day., Disp: 100 Tablet, Rfl: 1    Empagliflozin (JARDIANCE) 25 MG Tab, Take 1 tab (25 mg) by mouth every day., Disp: 100 Tablet, Rfl: 1    glimepiride (AMARYL) 4 MG Tab, Take 1 Tablet by mouth every morning., Disp: 100 Tablet, Rfl: 0    gabapentin (NEURONTIN) 100 MG Cap, Take 1 Capsule by mouth 3 times a day. (Patient taking differently: Take 10 mg by mouth every day.), Disp: 90 Capsule,  Rfl: 0    metFORMIN (GLUCOPHAGE) 500 MG Tab, Take 2 Tablets by mouth 2 times a day with meals., Disp: 100 Tablet, Rfl: 13    vitamin D (CHOLECALCIFEROL) 1000 UNIT Tab, Take 1,000 Units by mouth every day., Disp: , Rfl:     glucose blood strip, 1 Each by Other route as needed., Disp: , Rfl:     STOOL SOFTENER PO, Take 1 Tab by mouth every day., Disp: , Rfl:     Labs: Reviewed    Physical Examination:  Vital signs: There were no vitals taken for this visit. There is no height or weight on file to calculate BMI.    Assessment and Plan:    1. DM2  A1c goal < 7%, A1c is not at goal and has increased from 8.4% (08/2023) to 9.7% today  Reported FBG have also been running higher than previous visit  Patient resumed Rybelsus >4 weeks ago as instructed by PCP therefore should observe BG-lowering effect at this time  She is already on the maximum doses of her medications except for glimepiride. When asked when she initiated glimepiride, she states that she was on the medication in the past, discontinued it, then resumed it (unable to recall when she resumed the medication). Unclear how much benefit patient is getting from [facility] at this time though she feels that this is still helping keep her BG levels lower and prefers to continue the medication.  Given additional need for BG control, will initiate basal insulin.  Patient requested assistance with re-applying for Jardiance and Rybelsus PAP today. She will drop off income documentation asap. Will utilize 2022 income documentation in the interim and submit most recent documentation once patient presents this. Completed forms faxed to [facility] and [email or web address].  If cost is not an issue in the future or if PAP becomes available, consider switching Rybelsus to Mounjaro as she likely needs additional PPG control.    - Medication changes:  Start insulin glargine 10 units daily  Continue all other DM medications as above    - Lifestyle changes:  Diet: Maximize lean proteins  and veggies. Cut out/down on carbs. Avoid simple sugars.   Exercise: Increase as tolerated    Follow Up:  4 weeks    Cesia Handley, IsaacD    CC:   DARLENE Brannon MD

## 2023-12-07 RX ORDER — INSULIN GLARGINE 300 U/ML
10 INJECTION, SOLUTION SUBCUTANEOUS EVERY EVENING
Qty: 1.5 ML | Refills: 1 | Status: SHIPPED | OUTPATIENT
Start: 2023-12-07

## 2023-12-19 ENCOUNTER — TELEPHONE (OUTPATIENT)
Dept: MEDICAL GROUP | Facility: PHYSICIAN GROUP | Age: 78
End: 2023-12-19
Payer: MEDICARE

## 2023-12-19 NOTE — TELEPHONE ENCOUNTER
Patient left voicemail stating that her R leg gave out 2 weeks ago. She states she went to urgent care for this and she went to [facility] to complete an [facility]. She states she was supposed to get a call back in regards to these results and still has not. Patient would like to know MRI results.

## 2023-12-20 DIAGNOSIS — M48.061 NEUROFORAMINAL STENOSIS OF LUMBAR SPINE: ICD-10-CM

## 2023-12-20 DIAGNOSIS — M51.36 BULGING OF LUMBAR INTERVERTEBRAL DISC: ICD-10-CM

## 2023-12-22 NOTE — ASSESSMENT & PLAN NOTE
GFR 47, creatinine 1.19.  I suspect ELICEO from fasting/dehydration    Repeat follow-up 1 month   [facility]  Pediatric General Surgery  Progress Note    Patient Name: Cristian Hair  MRN: [id number]  Admission Date: 12/21/2023  Hospital Length of Stay: 1 days  Attending Physician: Kristin Gibson MD  Primary Care Provider: Ruth, Primary Doctor    Subjective:     Interval History: NAEON. Tmax 103.1 but HDS. Tolerated clears without NV. Pain controlled. IR consulted for drain placement today.     Post-Op Info:  * No surgery found *           Medications:  Continuous Infusions:   dextrose 5 % and 0.45 % NaCl with KCl 20 mEq 85 mL/hr at 12/21/23 1807     Scheduled Meds:   cefTRIAXone (ROCEPHIN) IVPB  2 g Intravenous Q24H    metronidazole  500 mg Intravenous Q8H     PRN Meds:acetaminophen, ibuprofen, ondansetron     Review of patient's allergies indicates:  No Known Allergies    Objective:     Vital Signs (Most Recent):  Temp: 99.5 °F (37.5 °C) (12/22/23 0400)  Pulse: 87 (12/22/23 0400)  Resp: (!) 21 (12/22/23 0400)  BP: 105/64 (12/22/23 0400)  SpO2: 97 % (12/22/23 0400) Vital Signs (24h Range):  Temp:  [97.9 °F (36.6 °C)-103.1 °F (39.5 °C)] 99.5 °F (37.5 °C)  Pulse:  [] 87  Resp:  [19-22] 21  SpO2:  [96 %-98 %] 97 %  BP: (105-130)/(59-83) 105/64       Intake/Output Summary (Last 24 hours) at 12/22/2023 0646  Last data filed at 12/21/2023 2215  Gross per 24 hour   Intake 360 ml   Output --   Net 360 ml          Physical Exam  Vitals reviewed.   Constitutional:       General: He is not in acute distress.     Appearance: Normal appearance. He is not toxic-appearing.   Cardiovascular:      Rate and Rhythm: Normal rate and regular rhythm.      Pulses: Normal pulses.      Heart sounds: Normal heart sounds.   Pulmonary:      Effort: Pulmonary effort is normal.      Breath sounds: Normal breath sounds.   Abdominal:      General: Abdomen is flat. Bowel sounds are normal. There is no distension.      Palpations: Abdomen is soft.      Tenderness: There is abdominal tenderness (Minor RLQ and LLQ). There  is no guarding or rebound.   Skin:     General: Skin is warm.      Capillary Refill: Capillary refill takes less than 2 seconds.   Neurological:      General: No focal deficit present.      Mental Status: He is alert and oriented to person, place, and time.   Psychiatric:         Mood and Affect: Mood normal.         Behavior: Behavior normal.            Significant Labs:  I have reviewed all pertinent lab results within the past 24 hours.    Significant Diagnostics:  I have reviewed all pertinent imaging results/findings within the past 24 hours.  Assessment/Plan:     Acute appendicitis with appendiceal abscess  14y M presenting with perforated appendicitis w/ an intraabdominal abscess      - Continue ceftriaxone/flagyl  - Continue mIVF  - NPO  - consulted IR for possible drain placement  - prn meds for nausea, pain           Kai Valdovinos MD  Pediatric General Surgery  [facility] - Pediatric Acute Care    Staff    Seen and examined.    Spoke with parents.    Sick for 5-6 days.    Some diarrhea.    Not much emesis.    Walking slowly.    Not distended.    Tender in the RLQ and suprapubic area.    CT reviewed.    Plan to have IR drain the abscess and continue antibiotics.    They are visiting [place] from [place].

## 2023-12-26 ENCOUNTER — TELEPHONE (OUTPATIENT)
Dept: VASCULAR LAB | Facility: MEDICAL CENTER | Age: 78
End: 2023-12-26
Payer: MEDICARE

## 2023-12-26 ENCOUNTER — TELEPHONE (OUTPATIENT)
Dept: SCHEDULING | Facility: IMAGING CENTER | Age: 78
End: 2023-12-26
Payer: MEDICARE

## 2023-12-26 NOTE — TELEPHONE ENCOUNTER
[facility]uling [facility]    PT Name: Zenaida SCOTT Blanca     PT MRN: [id number]    Best Call Back Number: [phone] (home)       Any special instructions: Pt would like to schedule appt for 01/17/2024    Thank You    Angeles MULLIGAN

## 2023-12-26 NOTE — TELEPHONE ENCOUNTER
Attempted to call pt back. Left VM w/ her that our pharmacist is only in [place] on Wednesdays, otherwise, if she wants to be seen on Thursday or Friday, she will need to be seen at the [facility] location on [facility] in [place].    Denise Adan, PharmD

## 2023-12-26 NOTE — TELEPHONE ENCOUNTER
[facility]uling [facility]    PT Name: Zenaida Rios     PT MRN: [id number]     Best Call Back Number: [phone] (home)       Any special instructions: Pt can only do a Thursday or Friday    Thank you,     Chano CASTELLANOS

## 2023-12-28 ENCOUNTER — OFFICE VISIT (OUTPATIENT)
Dept: MEDICAL GROUP | Facility: PHYSICIAN GROUP | Age: 78
End: 2023-12-28
Payer: MEDICARE

## 2023-12-28 ENCOUNTER — APPOINTMENT (OUTPATIENT)
Dept: MEDICAL GROUP | Facility: PHYSICIAN GROUP | Age: 78
End: 2023-12-28
Payer: MEDICARE

## 2023-12-28 VITALS
HEIGHT: 62 IN | WEIGHT: 140 LBS | OXYGEN SATURATION: 99 % | SYSTOLIC BLOOD PRESSURE: 140 MMHG | TEMPERATURE: 97.5 F | HEART RATE: 78 BPM | DIASTOLIC BLOOD PRESSURE: 72 MMHG | BODY MASS INDEX: 25.76 KG/M2 | RESPIRATION RATE: 16 BRPM

## 2023-12-28 DIAGNOSIS — M48.04 NEUROFORAMINAL STENOSIS OF THORACIC SPINE: ICD-10-CM

## 2023-12-28 DIAGNOSIS — M48.061 NEUROFORAMINAL STENOSIS OF LUMBAR SPINE: Primary | ICD-10-CM

## 2023-12-28 DIAGNOSIS — R29.898 RIGHT LEG WEAKNESS: ICD-10-CM

## 2023-12-28 PROCEDURE — 3077F SYST BP >= 140 MM HG: CPT | Performed by: STUDENT IN AN ORGANIZED HEALTH CARE EDUCATION/TRAINING PROGRAM

## 2023-12-28 PROCEDURE — 3078F DIAST BP <80 MM HG: CPT | Performed by: STUDENT IN AN ORGANIZED HEALTH CARE EDUCATION/TRAINING PROGRAM

## 2023-12-28 PROCEDURE — 99214 OFFICE O/P EST MOD 30 MIN: CPT | Performed by: STUDENT IN AN ORGANIZED HEALTH CARE EDUCATION/TRAINING PROGRAM

## 2023-12-28 RX ORDER — CLINDAMYCIN HYDROCHLORIDE 150 MG/1
450 CAPSULE ORAL
COMMUNITY
Start: 2023-12-21 | End: 2023-12-28

## 2023-12-28 RX ORDER — METHYLPREDNISOLONE 4 MG/1
TABLET ORAL
COMMUNITY
Start: 2023-11-30 | End: 2023-12-28

## 2023-12-28 NOTE — PROGRESS NOTES
HISTORY OF PRESENT ILLNESS: Zenaida SCOTT is a pleasant 78 y.o. female, established patient who presents today to discuss medical problems as listed below:    # Right leg weakness  Patient here following up for her right leg weakness which she reports has been giving out on her recently.  She went to the emergency room on 11/30/2023 had a MRI done which she does not know the results of.  She been having significant back pain as well.  Denies any bowel or bladder incontinence.  Reports to dizziness 1 time that is now resolved.     Current Outpatient Medications Ordered in Epic   Medication Sig Dispense Refill    Insulin Pen Needle 32 G x 4 mm Use one pen needle in pen device to inject insulin once daily. 100 Each 1    lisinopril (PRINIVIL) 40 MG tablet Take 1 Tablet by mouth every day. 100 Tablet 1    pravastatin (PRAVACHOL) 10 MG Tab Take 1 tablet by mouth daily 100 Tablet 3    therapeutic multivitamin-minerals (THERAGRAN-M) Tab Take 1 Tablet by mouth every day.      amLODIPine (NORVASC) 10 MG Tab Take 1 Tablet by mouth every day. 100 Tablet 1    atenolol (TENORMIN) 50 MG Tab Take 1 Tablet by mouth every day. 100 Tablet 1    Empagliflozin (JARDIANCE) 25 MG Tab Take 1 tab (25 mg) by mouth every day. 100 Tablet 1    glimepiride (AMARYL) 4 MG Tab Take 1 Tablet by mouth every morning. 100 Tablet 0    gabapentin (NEURONTIN) 100 MG Cap Take 1 Capsule by mouth 3 times a day. (Patient taking differently: Take 10 mg by mouth every day.) 90 Capsule 0    metFORMIN (GLUCOPHAGE) 500 MG Tab Take 2 Tablets by mouth 2 times a day with meals. 100 Tablet 13    vitamin D (CHOLECALCIFEROL) 1000 UNIT Tab Take 1,000 Units by mouth every day.      glucose blood strip 1 Each by Other route as needed.      STOOL SOFTENER PO Take 1 Tab by mouth every day.      Insulin Glargine, 1 Unit Dial, (TOUJEO SOLOSTAR) 300 UNIT/ML Solution Pen-injector Inject 10 Units under the skin every evening. 1.5 mL 1    Insulin Glargine-yfgn 100 UNIT/ML Solution  Pen-injector INJECT 10 UNITS UNDER THE SKIN DAILY OR AS DIRECTED (MAX 50 UNITS/DAY) 20 mL 1    Semaglutide (RYBELSUS) 7 MG Tab Take1 tablet (7 mg) by mouth every day. Start after 30 days of 3mg dose. 60 Tablet 1     No current Epic-ordered facility-administered medications on file.       Review of systems:  Per HPI    Patient Active Problem List    Diagnosis Date Noted    Neuroforaminal stenosis of thoracic spine 12/28/2023    Wound healing, delayed 11/29/2023    Poor dentition 11/29/2023    ELICEO (acute kidney injury) ([facility]) 10/24/2023    Wound cellulitis 10/24/2023    History of lumbar fusion 08/15/2023    Sciatic nerve pain, right 08/15/2023    On economic assistance program 05/24/2023    Healthcare maintenance 06/01/2021    Dyslipidemia 11/19/2019    Type 2 diabetes mellitus with diabetic neuropathy, without long-term current use of insulin ([facility])     Neuropathy 01/17/2019    Vitamin D deficiency 01/17/2019    Obesity (BMI 30-39.9) 07/17/2018    Benign essential HTN 05/21/2018     Past Surgical History:   Procedure Laterality Date    FUSION, SPINE, LUMBAR, PLIF  1/11/2010    Performed by LYNNETTE SMILEY at SURGERY [facility]    LAMINOTOMY  1/11/2010    Performed by LYNNETTE SMILEY at SURGERY [facility]S    LUMBAR LAMINECTOMY DISKECTOMY  8/31/2009    OTHER ORTHOPEDIC SURGERY  1989    tibia has a tanna knee to ankle, fibula is resected    PRIMARY C SECTION  1988    OTHER  1975    left lumpectomt in breast - benign    OTHER       Social History     Tobacco Use    Smoking status: Never    Smokeless tobacco: Never   Vaping Use    Vaping Use: Never used   Substance Use Topics    Alcohol use: No    Drug use: No      Family History   Adopted: Yes     Current Outpatient Medications   Medication Sig Dispense Refill    Insulin Pen Needle 32 G x 4 mm Use one pen needle in pen device to inject insulin once daily. 100 Each 1    lisinopril (PRINIVIL) 40 MG tablet Take 1 Tablet by mouth every day. 100 Tablet 1    pravastatin  "(PRAVACHOL) 10 MG Tab Take 1 tablet by mouth daily 100 Tablet 3    therapeutic multivitamin-minerals (THERAGRAN-M) Tab Take 1 Tablet by mouth every day.      amLODIPine (NORVASC) 10 MG Tab Take 1 Tablet by mouth every day. 100 Tablet 1    atenolol (TENORMIN) 50 MG Tab Take 1 Tablet by mouth every day. 100 Tablet 1    Empagliflozin (JARDIANCE) 25 MG Tab Take 1 tab (25 mg) by mouth every day. 100 Tablet 1    glimepiride (AMARYL) 4 MG Tab Take 1 Tablet by mouth every morning. 100 Tablet 0    gabapentin (NEURONTIN) 100 MG Cap Take 1 Capsule by mouth 3 times a day. (Patient taking differently: Take 10 mg by mouth every day.) 90 Capsule 0    metFORMIN (GLUCOPHAGE) 500 MG Tab Take 2 Tablets by mouth 2 times a day with meals. 100 Tablet 13    vitamin D (CHOLECALCIFEROL) 1000 UNIT Tab Take 1,000 Units by mouth every day.      glucose blood strip 1 Each by Other route as needed.      STOOL SOFTENER PO Take 1 Tab by mouth every day.      Insulin Glargine, 1 Unit Dial, (TOUJEO SOLOSTAR) 300 UNIT/ML Solution Pen-injector Inject 10 Units under the skin every evening. 1.5 mL 1    Insulin Glargine-yfgn 100 UNIT/ML Solution Pen-injector INJECT 10 UNITS UNDER THE SKIN DAILY OR AS DIRECTED (MAX 50 UNITS/DAY) 20 mL 1    Semaglutide (RYBELSUS) 7 MG Tab Take1 tablet (7 mg) by mouth every day. Start after 30 days of 3mg dose. 60 Tablet 1     No current facility-administered medications for this visit.       Allergies:  Allergies   Allergen Reactions    Hydromorphone Rash    Morphine Rash    Oxycodone-Acetaminophen Unspecified    Percocet [Oxycodone-Acetaminophen] Swelling    Demerol     Statins [Hmg-Coa-R Inhibitors]        Allergies, past medical history, past surgical history, family history, social history reviewed and updated.    Objective:    BP (!) 140/72   Pulse 78   Temp 36.4 °C (97.5 °F) (Temporal)   Resp 16   Ht 1.575 m (5' 2\")   Wt 63.5 kg (140 lb)   SpO2 99%   BMI 25.61 kg/m²    Body mass index is 25.61 " kg/m².    Physical exam:  General: Normal appearance, no acute distress, not ill-appearing  HEENT: EOM intact, conjunctiva normal limits, negative right/left eye discharge.  Sclera anicteric  Musculoskeletal: Antalgic gait  Neurological: No focal deficits  Psychiatric: Mood within normal limits    MRI lumbar spine 11/30/2023:  T11-T12 disc bulge and facet arthropathy moderately narrow spinal canal with mass affect in the lower thoracic spinal cord, questionable T2 signal in the lower thoracic spinal cord.  Moderate multilevel neuroforaminal narrowing in the lumbar spine.    Assessment/Plan:    Problem List Items Addressed This Visit       Neuroforaminal stenosis of thoracic spine     Advised patient of her MRI results above.  Neurosurgery was consulted in the emergency room and advised to get a stat MRI thoracic imaging.  The ER reports that patient had declined this and left AMA.  She was referred to neurosurgery and has an appointment next week    Will order MRI thoracic imaging stat so she can follow-up with neurosurgery next week    She just finished a course of prednisone prescribed at the ER.    She denies any urinary incontinence, bowel incontinence.  ER precautions given    Follow-up in 1 month         Relevant Orders    MR-THORACIC SPINE-W/O     Other Visit Diagnoses       Neuroforaminal stenosis of lumbar spine    -  Primary    Right leg weakness        Relevant Orders    MR-THORACIC SPINE-W/O             Return in about 4 weeks (around 1/25/2024) for diabetes.

## 2023-12-28 NOTE — ASSESSMENT & PLAN NOTE
Advised patient of her MRI results above.  Neurosurgery was consulted in the emergency room and advised to get a stat MRI thoracic imaging.  The ER reports that patient had declined this and left AMA.  She was referred to neurosurgery and has an appointment next week    Will order MRI thoracic imaging stat so she can follow-up with neurosurgery next week    She just finished a course of prednisone prescribed at the ER.    She denies any urinary incontinence, bowel incontinence.  ER precautions given    Follow-up in 1 month

## 2024-01-03 ENCOUNTER — APPOINTMENT (OUTPATIENT)
Dept: CARDIOLOGY | Facility: PHYSICIAN GROUP | Age: 79
End: 2024-01-03
Payer: MEDICARE

## 2024-01-17 ENCOUNTER — OFFICE VISIT (OUTPATIENT)
Dept: MEDICAL GROUP | Facility: PHYSICIAN GROUP | Age: 79
End: 2024-01-17
Payer: MEDICARE

## 2024-01-17 DIAGNOSIS — E11.40 TYPE 2 DIABETES MELLITUS WITH DIABETIC NEUROPATHY, WITHOUT LONG-TERM CURRENT USE OF INSULIN (HCC): ICD-10-CM

## 2024-01-17 PROCEDURE — RXMED WILLOW AMBULATORY MEDICATION CHARGE: Performed by: STUDENT IN AN ORGANIZED HEALTH CARE EDUCATION/TRAINING PROGRAM

## 2024-01-17 PROCEDURE — 99211 OFF/OP EST MAY X REQ PHY/QHP: CPT | Performed by: NURSE PRACTITIONER

## 2024-01-17 RX ORDER — ORAL SEMAGLUTIDE 14 MG/1
1 TABLET ORAL DAILY
COMMUNITY
End: 2024-02-16

## 2024-01-17 RX ORDER — ACYCLOVIR 400 MG/1
1 TABLET ORAL DAILY
Qty: 1 EACH | Refills: 0 | Status: SHIPPED | OUTPATIENT
Start: 2024-01-17 | End: 2024-02-21

## 2024-01-17 RX ORDER — ACYCLOVIR 400 MG/1
1 TABLET ORAL
Qty: 3 EACH | Refills: 11 | Status: SHIPPED | OUTPATIENT
Start: 2024-01-17 | End: 2024-02-21

## 2024-01-17 NOTE — Clinical Note
Hello! This pt is receiving Rybelsus from [facility], but has yet to hear back on her Rx. We tried calling in clinic today and we were unable to get through after waiting for 30 min. Can you f/u w/ Rachel and see what the hold up is? I re-faxed the appt today today!  Let me know if there's anything else I can do to assist!  Thank you! Manuel

## 2024-01-17 NOTE — PROGRESS NOTES
"Patient Consult Note    TIME IN: 1:00 pm  TIME OUT: 1:52 pm    Primary care physician: Bakari Berumen D.O.    Reason for consult: Management of Uncontrolled Type 2 Diabetes    HPI:  Zenaida Rios is a 78 y.o. old patient who comes in today for evaluation of above stated problem.    Allergies  Hydromorphone, Morphine, Oxycodone-acetaminophen, Percocet [oxycodone-acetaminophen], Demerol, and Statins [hmg-coa-r inhibitors]    Current Diabetes Medication Regimen  Metformin IR: 1000 mg BID  GLP-1 Agent: semaglutide (Rybelsus) 14 mg daily - pt not taking. States she never received the medication, but was notified that the medication was delivered to our central office. States she has not taken for months now.  SGLT-2 Inhibitor: empagliflozin (Jardiance) 25 mg daily  Sulfonylurea: glimepiride 4 mg once daily    Basal Insulin: Glargine 10 units QHS - has NOT started as of today    Previous Diabetes Medications and Reason for Discontinuation  Pioglitazone - pt states \"horrible reaction\" many years ago that she cannot recall   Rybelsus - lethargy, fatigue, and abd pain     Potential Barriers to Care:  Adherence: reports missed doses - not taking Rybelsus d/t not receiving PAP shipment.  Side effects: none  Affordability: No issues noted    SMBG  Pt has home glucometer and proper testing technique - Yes  Discussed BG Goals: FBG 80 - 130, 2hPP < 180, A1c < 7%    Pt reports blood sugars:   Before Breakfast: 209, 236, 182, 177, 176, 194, 224, 153, 233, 216, 213, 209, 191, 183, 154, 175, 197, 199, 238, 185, 232, 127, 201, 17, 208    Hypoglycemia  Hypoglycemia awareness: Yes  Nocturnal hypoglycemia: None  Hypoglycemia:  None  Pt's treatment of Hypoglycemia  Discussed 15:15 Rule    Lifestyle  Current Exercise - Less active since last visit. Walks her dogs. 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 Has had more pain in her legs preventing her from exercise. Reports recent GLF. Has wound on her leg that is " "limiting to physical activity.  Exercise Goal - Increase to goal of 30 min 5x/week     Dietary: Unchanged from last time  Breakfast - None typically  Lunch - Cheese sticks, sandwich (PB), \"whatever is in the refigerator\"  Dinner - Cooks meals at home. Typically meat, potato, lots of vegetables. Occasionally skips.  Snacks - Cheese, crackers  Desserts - Avoids sweets mostly  Drinks - Coffee w/ creamer, water    Preventative Management  BP regimen (ACEi/ARB): Lisinopril 40 mg once daily  Statin: pravastatin (Pravachol) 10 mg daily  Last Microalbumin/Cr: See below  Last A1c:  Lab Results   Component Value Date/Time    HBA1C 9.7 (A) 12/06/2023 09:30 AM      Last Retinal Scan: Completed 9/2023    Monofilament exam: Completed 8/2023     Labs         Physical Examination:  Vital signs: There were no vitals taken for this visit. There is no height or weight on file to calculate BMI.    Assessment and Plan:    1. DM2  Since pt's last appt, she did not start her basal insulin d/t apprehensiveness regarding therapy (she is nervous given PMH of sensitivity to medications and Fhx of issues w/ insulin) - she does have the medication at home though.   She also has been w/out GLP1 d/t running out of medication - she was under the impression that it was waiting for her at our central pharmacist office, but it is not. Called [facility] today to inquire about PAP status - waited on hold for 30 min before I had to move onto my next pt. Re-faxed PAP dayami today.  Will request that [facility] rep contact [facility].  Discussed Goals: FBG 80 - 130, 2hPP < 180, a1c < 7.0%   Last a1c drawn on 12/6/23 was 9.7%, which is not at goal  Pt reported FBG are significantly above goal at this time. They have worsened since last visit. Pt states lifestyle is largely unchanged since last visit.  Pt inquired about CGM today - will send in Rx for Dexcom to pt's preferred pharmacy to see if it's covered.    - Medication changes:  START glargine 10 units QAM - pt prefers " to administer in the AM vs HS   - Continue:  Metformin 1000 mg BID  Jardiance 25 mg once daily  Glimepiride 4 mg once daily - for now, low threshold to DC moving forward  Resume Rybelsus 14 mg once daily upon receipt of medication     - Lifestyle changes:  Diet: Maximize lean proteins and veggies. Cut out/down on carbs. Avoid simple sugars.   Exercise: Increase as tolerated    Follow Up:  2 weeks    Isaac McculloughD, BCACP    CC:   Bakari Berumen D.O.

## 2024-01-25 ENCOUNTER — PHARMACY VISIT (OUTPATIENT)
Dept: PHARMACY | Facility: MEDICAL CENTER | Age: 79
End: 2024-01-25
Payer: COMMERCIAL

## 2024-01-31 ENCOUNTER — OFFICE VISIT (OUTPATIENT)
Dept: MEDICAL GROUP | Facility: PHYSICIAN GROUP | Age: 79
End: 2024-01-31
Payer: MEDICARE

## 2024-01-31 VITALS
HEART RATE: 88 BPM | BODY MASS INDEX: 25.76 KG/M2 | WEIGHT: 140 LBS | RESPIRATION RATE: 15 BRPM | OXYGEN SATURATION: 96 % | HEIGHT: 62 IN

## 2024-01-31 DIAGNOSIS — E11.40 TYPE 2 DIABETES MELLITUS WITH DIABETIC NEUROPATHY, WITHOUT LONG-TERM CURRENT USE OF INSULIN (HCC): ICD-10-CM

## 2024-01-31 PROCEDURE — 99211 OFF/OP EST MAY X REQ PHY/QHP: CPT | Performed by: NURSE PRACTITIONER

## 2024-01-31 NOTE — PROGRESS NOTES
"Patient Consult Note - Follow Up Visit  Primary care physician: Bakari Berumen D.O.    Reason for consult: Management of Uncontrolled Type 2 Diabetes    Time IN:  1:01pm  Time OUT:  1:29pm    HPI:  Zenaida Rios is a 78 y.o. old patient who comes in today for evaluation of above stated problem.    Most Recent HbA1c:   Lab Results   Component Value Date/Time    HBA1C 9.7 (A) 12/06/2023 09:30 AM        Current Diabetes Medication Regimen  Metformin IR: 1000 mg BID  SGLT-2 Inhibitor: empagliflozin (Jardiance) 25 mg daily  Sulfonylurea: glimepiride 4 mg once daily   Basal Insulin: Glargine 10 units QHS     Previous Diabetes Medications and Reason for Discontinuation  Pioglitazone - pt states \"horrible reaction\" many years ago that she cannot recall   Rybelsus - lethargy, fatigue, and abd pain - Has not received through PAP, states she received a letter from [facility], but \"hasn't messed with it yet\".      Before Breakfast:  reporting FBG mostly in 120-130 range, did not bring exact numbers or log  Before Lunch:  Before Dinner:  Before Bedtime:  Other times:  Hypoglycemia:  None    Diet/Exercise:  No remarkable changes from last as shown below:    Lifestyle  Current Exercise - Less active since last visit. Walks her dogs. 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 Has had more pain in her legs preventing her from exercise. Reports recent GLF. Has wound on her leg that is limiting to physical activity.  Exercise Goal - Increase to goal of 30 min 5x/week     Dietary: Unchanged from last time  Breakfast - None typically  Lunch - Cheese sticks, sandwich (PB), \"whatever is in the refigerator\"  Dinner - Cooks meals at home. Typically meat, potato, lots of vegetables. Occasionally skips.  Snacks - Cheese, crackers  Desserts - Avoids sweets mostly  Drinks - Coffee w/ creamer, water    Preventative Management  BP regimen (ACEi/ARB): Lisinopril 40mg QD  BP <140/90: Yes  Statin: pravastatin (Pravachol) 10 mg " daily  LDL <100: No  Lab Results   Component Value Date/Time    CHOLSTRLTOT 179 07/15/2021 07:40 AM     (H) 07/15/2021 07:40 AM    HDL 43 07/15/2021 07:40 AM    TRIGLYCERIDE 153 (H) 07/15/2021 07:40 AM       Last Microalbumin/Cr:  Lab Results   Component Value Date/Time    MALBCRT 82 (H) 07/20/2021 08:15 AM    MICROALBUR 5.4 07/20/2021 08:15 AM     Last A1c:  Lab Results   Component Value Date/Time    HBA1C 9.7 (A) 12/06/2023 09:30 AM      Last Retinal Scan: 9/2023    Monofilament exam: up to date, 8/2023     ROS:  Constitutional: No weight loss  Cardiac: No palpitations or racing heart  Resp: No shortness of breath  Neuro: No numbness or tinging in feet  Endo: No heat or cold intolerance, no polyuria or polydipsia  All other systems were reviewed and were negative.    Past Medical History:  Patient Active Problem List    Diagnosis Date Noted    Neuroforaminal stenosis of thoracic spine 12/28/2023    Wound healing, delayed 11/29/2023    Poor dentition 11/29/2023    ELICEO (acute kidney injury) (HCC) 10/24/2023    Wound cellulitis 10/24/2023    History of lumbar fusion 08/15/2023    Sciatic nerve pain, right 08/15/2023    On economic assistance program 05/24/2023    Healthcare maintenance 06/01/2021    Dyslipidemia 11/19/2019    Type 2 diabetes mellitus with diabetic neuropathy, without long-term current use of insulin ([facility])     Neuropathy 01/17/2019    Vitamin D deficiency 01/17/2019    Obesity (BMI 30-39.9) 07/17/2018    Benign essential HTN 05/21/2018       Past Surgical History:  Past Surgical History:   Procedure Laterality Date    FUSION, SPINE, LUMBAR, PLIF  1/11/2010    Performed by LYNNETTE SMILEY at SURGERY [facility]    LAMINOTOMY  1/11/2010    Performed by LYNNETTE SMILEY at SURGERY [facility]    LUMBAR LAMINECTOMY DISKECTOMY  8/31/2009    OTHER ORTHOPEDIC SURGERY  1989    tibia has a tanna knee to ankle, fibula is resected    PRIMARY C SECTION  1988    OTHER  1975    left lumpectomt in breast  - benign    OTHER         Allergies:  Hydromorphone, Morphine, Oxycodone-acetaminophen, Percocet [oxycodone-acetaminophen], Demerol, and Statins [hmg-coa-r inhibitors]    Social History:  Social History     Socioeconomic History    Marital status:      Spouse name: Not on file    Number of children: Not on file    Years of education: Not on file    Highest education level: Not on file   Occupational History    Not on file   Tobacco Use    Smoking status: Never    Smokeless tobacco: Never   Vaping Use    Vaping Use: Never used   Substance and Sexual Activity    Alcohol use: No    Drug use: No    Sexual activity: Yes     Partners: Male     Comment:    Other Topics Concern    Not on file   Social History Narrative    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[employer]      Social Determinants of Health     Financial Resource Strain: Not on file   Food Insecurity: Not on file   Transportation Needs: Not on file   Physical Activity: Not on file   Stress: Not on file   Social Connections: Not on file   Intimate Partner Violence: Not on file   Housing Stability: Not on file       Family History:  Family History   Adopted: Yes       Medications:    Current Outpatient Medications:     Continuous Blood Gluc Sensor (DEXCOM G7 SENSOR) Misc, Use 1 each every 10 days. Use to test BG as directed., Disp: 3 Each, Rfl: 11    Continuous Blood Gluc  (DEXCOM G7 ) Device, Use 1 each every day. Use as directed to monitor BG., Disp: 1 Each, Rfl: 0    Semaglutide (RYBELSUS) 14 MG Tab, Take 1 Tablet by mouth every day., Disp: , Rfl:     Insulin Glargine, 1 Unit Dial, (TOUJEO SOLOSTAR) 300 UNIT/ML Solution Pen-injector, Inject 10 Units under the skin every evening., Disp: 1.5 mL, Rfl: 1    Insulin Pen Needle 32 G x 4 mm, Use one pen needle in pen device to inject insulin once daily., Disp: 100 Each, Rfl: 1    Insulin Glargine-yfgn 100 UNIT/ML Solution Pen-injector, INJECT 10 UNITS UNDER THE SKIN DAILY OR AS DIRECTED (MAX 50  "UNITS/DAY), Disp: 20 mL, Rfl: 1    lisinopril (PRINIVIL) 40 MG tablet, Take 1 Tablet by mouth every day., Disp: 100 Tablet, Rfl: 1    pravastatin (PRAVACHOL) 10 MG Tab, Take 1 tablet by mouth daily, Disp: 100 Tablet, Rfl: 3    therapeutic multivitamin-minerals (THERAGRAN-M) Tab, Take 1 Tablet by mouth every day., Disp: , Rfl:     amLODIPine (NORVASC) 10 MG Tab, Take 1 Tablet by mouth every day., Disp: 100 Tablet, Rfl: 1    atenolol (TENORMIN) 50 MG Tab, Take 1 Tablet by mouth every day., Disp: 100 Tablet, Rfl: 1    Empagliflozin (JARDIANCE) 25 MG Tab, Take 1 tab (25 mg) by mouth every day., Disp: 100 Tablet, Rfl: 1    glimepiride (AMARYL) 4 MG Tab, Take 1 Tablet by mouth every morning., Disp: 100 Tablet, Rfl: 0    gabapentin (NEURONTIN) 100 MG Cap, Take 1 Capsule by mouth 3 times a day. (Patient taking differently: Take 10 mg by mouth every day.), Disp: 90 Capsule, Rfl: 0    metFORMIN (GLUCOPHAGE) 500 MG Tab, Take 2 Tablets by mouth 2 times a day with meals., Disp: 100 Tablet, Rfl: 13    vitamin D (CHOLECALCIFEROL) 1000 UNIT Tab, Take 1,000 Units by mouth every day., Disp: , Rfl:     glucose blood strip, 1 Each by Other route as needed., Disp: , Rfl:     STOOL SOFTENER PO, Take 1 Tab by mouth every day., Disp: , Rfl:     Labs: Reviewed    Physical Examination:  Vital signs: Pulse 88   Resp 15   Ht 1.575 m (5' 2.01\")   Wt 63.5 kg (140 lb)   SpO2 96%   BMI 25.60 kg/m²  Body mass index is 25.6 kg/m².  General: No apparent distress, cooperative  Eyes: No scleral icterus or discharge  ENMT: Normal on external inspection of nose, lips, normal thyroid exam  Neck: No abnormal masses on inspection  Resp: Normal effort, clear to auscultation bilaterally   CVS: Regular rate and rhythm, S1 S2 normal, no murmur   Extremities: No edema  Abdomen: abdominal obesity present  Neuro: Alert and oriented  Skin: No rash  Psych: Normal mood and affect, intact memory and able to make informed decisions    Assessment and " Plan:    Patient is optimized on metformin and Jardiance.  Verified current dosing of glimepiride.  Verified that insulin was started and that she has done 10 units daily at this time.  Home FBG seem to be much improved from last visit, although, she did not bring specific log books.    Majority of today's visit was spent assisting patient with setting up Dexcom 7  and applying first sensor for CGM.  She does seem a bit overwhelmed by process, but did feel a bit more comfortable by the time visit had ended.  She will reach out to clinic with any questions or concerns or difficulties with managing CGM.    As above, has not received Rybelsus, but did get letter from [employer].  Asked that she bring to next appt.    Will STOP glimepiride at this time in order to minimize potential for hypogylcemic events.  Continue all other medications for now.    Follow Up:  Two weeks.    Thank you for allowing me to participate in the care of this patient.    Bob Vo, IsaacD, BCACP  01/31/24    CC:   Bakari Berumen D.O.

## 2024-02-01 ENCOUNTER — APPOINTMENT (OUTPATIENT)
Dept: MEDICAL GROUP | Facility: PHYSICIAN GROUP | Age: 79
End: 2024-02-01
Payer: MEDICARE

## 2024-02-02 ENCOUNTER — APPOINTMENT (OUTPATIENT)
Dept: RADIOLOGY | Facility: MEDICAL CENTER | Age: 79
End: 2024-02-02
Attending: STUDENT IN AN ORGANIZED HEALTH CARE EDUCATION/TRAINING PROGRAM
Payer: MEDICARE

## 2024-02-02 DIAGNOSIS — E11.9 TYPE 2 DIABETES MELLITUS WITHOUT COMPLICATION, WITHOUT LONG-TERM CURRENT USE OF INSULIN (HCC): ICD-10-CM

## 2024-02-02 RX ORDER — GLIMEPIRIDE 4 MG/1
4 TABLET ORAL EVERY MORNING
Qty: 100 TABLET | Refills: 0 | Status: SHIPPED | OUTPATIENT
Start: 2024-02-02 | End: 2024-02-16

## 2024-02-02 NOTE — TELEPHONE ENCOUNTER
Received request via: Patient    Was the patient seen in the last year in this department? Yes    Does the patient have an active prescription (recently filled or refills available) for medication(s) requested? No    [facility] Name[facility]    Does the patient have long term Plus and need 100 day supply (blood pressure, diabetes and cholesterol meds only)? Yes, quantity updated to 100 days

## 2024-02-05 DIAGNOSIS — I10 BENIGN ESSENTIAL HTN: ICD-10-CM

## 2024-02-05 DIAGNOSIS — Z76.0 MEDICATION REFILL: ICD-10-CM

## 2024-02-05 PROCEDURE — RXMED WILLOW AMBULATORY MEDICATION CHARGE: Performed by: STUDENT IN AN ORGANIZED HEALTH CARE EDUCATION/TRAINING PROGRAM

## 2024-02-06 PROCEDURE — RXMED WILLOW AMBULATORY MEDICATION CHARGE: Performed by: STUDENT IN AN ORGANIZED HEALTH CARE EDUCATION/TRAINING PROGRAM

## 2024-02-06 RX ORDER — ATENOLOL 50 MG/1
50 TABLET ORAL DAILY
Qty: 100 TABLET | Refills: 1 | Status: SHIPPED | OUTPATIENT
Start: 2024-02-06 | End: 2024-02-16 | Stop reason: SDUPTHER

## 2024-02-06 NOTE — TELEPHONE ENCOUNTER
Received request via: Pharmacy    Was the patient seen in the last year in this department? Yes    Does the patient have an active prescription (recently filled or refills available) for medication(s) requested? No    Pharmacy Name: [facility]     Does the patient have group home Plus and need 100 day supply (blood pressure, diabetes and cholesterol meds only)? Yes, quantity updated to 100 days

## 2024-02-08 ENCOUNTER — PHARMACY VISIT (OUTPATIENT)
Dept: PHARMACY | Facility: MEDICAL CENTER | Age: 79
End: 2024-02-08
Payer: COMMERCIAL

## 2024-02-14 ENCOUNTER — OFFICE VISIT (OUTPATIENT)
Dept: MEDICAL GROUP | Facility: PHYSICIAN GROUP | Age: 79
End: 2024-02-14
Payer: MEDICARE

## 2024-02-14 VITALS — DIASTOLIC BLOOD PRESSURE: 93 MMHG | HEART RATE: 79 BPM | SYSTOLIC BLOOD PRESSURE: 117 MMHG

## 2024-02-14 DIAGNOSIS — E11.40 TYPE 2 DIABETES MELLITUS WITH DIABETIC NEUROPATHY, WITHOUT LONG-TERM CURRENT USE OF INSULIN (HCC): ICD-10-CM

## 2024-02-14 PROCEDURE — 3080F DIAST BP >= 90 MM HG: CPT | Performed by: STUDENT IN AN ORGANIZED HEALTH CARE EDUCATION/TRAINING PROGRAM

## 2024-02-14 PROCEDURE — 99211 OFF/OP EST MAY X REQ PHY/QHP: CPT | Performed by: STUDENT IN AN ORGANIZED HEALTH CARE EDUCATION/TRAINING PROGRAM

## 2024-02-14 PROCEDURE — 3074F SYST BP LT 130 MM HG: CPT | Performed by: STUDENT IN AN ORGANIZED HEALTH CARE EDUCATION/TRAINING PROGRAM

## 2024-02-14 RX ORDER — EMPAGLIFLOZIN 25 MG/1
25 TABLET, FILM COATED ORAL DAILY
Qty: 90 TABLET | Refills: 3 | Status: SHIPPED | OUTPATIENT
Start: 2024-02-14 | End: 2024-02-16 | Stop reason: SDUPTHER

## 2024-02-14 NOTE — PROGRESS NOTES
"Patient Consult Note - Follow Up Visit  Primary care physician: Bakari Berumen D.O.    Reason for consult: Management of Uncontrolled Type 2 Diabetes    Time IN:  1:01 pm  Time OUT:  1:47 pm    HPI:  Zenaida Rios is a 78 y.o. old patient who comes in today for evaluation of above stated problem.    Most Recent HbA1c:   Lab Results   Component Value Date/Time    HBA1C 9.7 (A) 12/06/2023 09:30 AM        Current Diabetes Medication Regimen  Metformin IR: 1000 mg BID  SGLT-2 Inhibitor: empagliflozin (Jardiance) 25 mg daily - not taking, did not get from PAP  Sulfonylurea: glimepiride 4 mg once daily   Basal Insulin: Glargine 10 units QHS     Previous Diabetes Medications and Reason for Discontinuation  Pioglitazone - pt states \"horrible reaction\" many years ago that she cannot recall   Rybelsus - lethargy, fatigue, and abd pain - Has not received through PAP, states she received a letter from [employer], but \"hasn't messed with it yet\".      Before Breakfast:  reporting 150-400 range, did not bring exact numbers - dexcom sensor fell off does not remember  Hypoglycemia:  None    Diet/Exercise:  No remarkable changes from last as shown below:    Lifestyle  Current Exercise - Previously, unable to discuss at this visit   Less active since last visit. Walks her dogs. 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 Has had more pain in her legs preventing her from exercise. Reports recent GLF. Has wound on her leg that is limiting to physical activity.  Exercise Goal - Increase to goal of 30 min 5x/week     Dietary: Previously, unable to discuss at this visit  Unchanged from last time  Breakfast - None typically  Lunch - Cheese sticks, sandwich (PB), \"whatever is in the refigerator\"  Dinner - Cooks meals at home. Typically meat, potato, lots of vegetables. Occasionally skips.  Snacks - Cheese, crackers  Desserts - Avoids sweets mostly  Drinks - Coffee w/ creamer, water    Preventative Management  BP regimen " (ACEi/ARB): Lisinopril 40mg QD  BP <140/90: Yes  Statin: pravastatin (Pravachol) 10 mg daily  LDL <100: No  Lab Results   Component Value Date/Time    CHOLSTRLTOT 179 07/15/2021 07:40 AM     (H) 07/15/2021 07:40 AM    HDL 43 07/15/2021 07:40 AM    TRIGLYCERIDE 153 (H) 07/15/2021 07:40 AM       Last Microalbumin/Cr:  Lab Results   Component Value Date/Time    MALBCRT 82 (H) 07/20/2021 08:15 AM    MICROALBUR 5.4 07/20/2021 08:15 AM     Last A1c:  Lab Results   Component Value Date/Time    HBA1C 9.7 (A) 12/06/2023 09:30 AM      Last Retinal Scan: 9/2023    Monofilament exam: up to date, 8/2023     ROS:  Constitutional: No weight loss  Cardiac: No palpitations or racing heart  Resp: No shortness of breath  Neuro: No numbness or tinging in feet  Endo: No heat or cold intolerance, no polyuria or polydipsia  All other systems were reviewed and were negative.    Past Medical History:  Patient Active Problem List    Diagnosis Date Noted    Neuroforaminal stenosis of thoracic spine 12/28/2023    Wound healing, delayed 11/29/2023    Poor dentition 11/29/2023    ELICEO (acute kidney injury) (HCC) 10/24/2023    Wound cellulitis 10/24/2023    History of lumbar fusion 08/15/2023    Sciatic nerve pain, right 08/15/2023    On economic assistance program 05/24/2023    Healthcare maintenance 06/01/2021    Dyslipidemia 11/19/2019    Type 2 diabetes mellitus with diabetic neuropathy, without long-term current use of insulin (HCC)     Neuropathy 01/17/2019    Vitamin D deficiency 01/17/2019    Obesity (BMI 30-39.9) 07/17/2018    Benign essential HTN 05/21/2018       Past Surgical History:  Past Surgical History:   Procedure Laterality Date    FUSION, SPINE, LUMBAR, PLIF  1/11/2010    Performed by LYNNETTE SMILEY at SURGERY [facility]    LAMINOTOMY  1/11/2010    Performed by LYNNETTE SMILEY at SURGERY [facility]    LUMBAR LAMINECTOMY DISKECTOMY  8/31/2009    OTHER ORTHOPEDIC SURGERY  1989    tibia has a tanna knee to ankle,  fibula is resected    PRIMARY C SECTION  1988    OTHER  1975    left lumpectomt in breast - benign    OTHER         Allergies:  Hydromorphone, Morphine, Oxycodone-acetaminophen, Percocet [oxycodone-acetaminophen], Demerol, and Statins [hmg-coa-r inhibitors]    Social History:  Social History     Socioeconomic History    Marital status:      Spouse name: Not on file    Number of children: Not on file    Years of education: Not on file    Highest education level: Not on file   Occupational History    Not on file   Tobacco Use    Smoking status: Never    Smokeless tobacco: Never   Vaping Use    Vaping Use: Never used   Substance and Sexual Activity    Alcohol use: No    Drug use: No    Sexual activity: Yes     Partners: Male     Comment:    Other Topics Concern    Not on file   Social History Narrative    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      Social Determinants of Health     Financial Resource Strain: Not on file   Food Insecurity: Not on file   Transportation Needs: Not on file   Physical Activity: Not on file   Stress: Not on file   Social Connections: Not on file   Intimate Partner Violence: Not on file   Housing Stability: Not on file       Family History:  Family History   Adopted: Yes       Medications:    Current Outpatient Medications:     Empagliflozin (JARDIANCE) 25 MG Tab, Take 25 mg by mouth every day., Disp: 90 Tablet, Rfl: 3    atenolol (TENORMIN) 50 MG Tab, Take 1 tablet by mouth every day., Disp: 100 Tablet, Rfl: 1    glimepiride (AMARYL) 4 MG Tab, Take 1 Tablet by mouth every morning., Disp: 100 Tablet, Rfl: 0    Continuous Blood Gluc Sensor (DEXCOM G7 SENSOR) Misc, Use 1 each every 10 days. Use to test BG as directed., Disp: 3 Each, Rfl: 11    Continuous Blood Gluc  (DEXCOM G7 ) Device, Use 1 each every day. Use as directed to monitor BG., Disp: 1 Each, Rfl: 0    Semaglutide (RYBELSUS) 14 MG Tab, Take 1 Tablet by mouth every day., Disp: , Rfl:     Insulin Glargine, 1  Unit Dial, (TOUJEO SOLOSTAR) 300 UNIT/ML Solution Pen-injector, Inject 10 Units under the skin every evening., Disp: 1.5 mL, Rfl: 1    Insulin Pen Needle 32 G x 4 mm, Use one pen needle in pen device to inject insulin once daily., Disp: 100 Each, Rfl: 1    Insulin Glargine-yfgn 100 UNIT/ML Solution Pen-injector, INJECT 10 UNITS UNDER THE SKIN DAILY OR AS DIRECTED (MAX 50 UNITS/DAY), Disp: 20 mL, Rfl: 1    lisinopril (PRINIVIL) 40 MG tablet, Take 1 Tablet by mouth every day., Disp: 100 Tablet, Rfl: 1    pravastatin (PRAVACHOL) 10 MG Tab, Take 1 tablet by mouth daily, Disp: 100 Tablet, Rfl: 3    therapeutic multivitamin-minerals (THERAGRAN-M) Tab, Take 1 Tablet by mouth every day., Disp: , Rfl:     amLODIPine (NORVASC) 10 MG Tab, Take 1 Tablet by mouth every day., Disp: 100 Tablet, Rfl: 1    gabapentin (NEURONTIN) 100 MG Cap, Take 1 Capsule by mouth 3 times a day. (Patient taking differently: Take 10 mg by mouth every day.), Disp: 90 Capsule, Rfl: 0    metFORMIN (GLUCOPHAGE) 500 MG Tab, Take 2 Tablets by mouth 2 times a day with meals., Disp: 100 Tablet, Rfl: 13    vitamin D (CHOLECALCIFEROL) 1000 UNIT Tab, Take 1,000 Units by mouth every day., Disp: , Rfl:     glucose blood strip, 1 Each by Other route as needed., Disp: , Rfl:     STOOL SOFTENER PO, Take 1 Tab by mouth every day., Disp: , Rfl:     Labs: Reviewed    Physical Examination:  Vital signs: BP (!) 117/93   Pulse 79  There is no height or weight on file to calculate BMI.  General: No apparent distress, cooperative  Eyes: No scleral icterus or discharge  ENMT: Normal on external inspection of nose, lips, normal thyroid exam  Neck: No abnormal masses on inspection  Resp: Normal effort, clear to auscultation bilaterally   CVS: Regular rate and rhythm, S1 S2 normal, no murmur   Extremities: No edema  Abdomen: abdominal obesity present  Neuro: Alert and oriented  Skin: No rash  Psych: Normal mood and affect, intact memory and able to make informed  "decisions    Assessment and Plan:    1. DM2  Discussed Goals: FBG 80 - 130, 2hPP < 180, a1c < 7.0%   Last a1c drawn on 12/6/24 was 9.7%, which is not at goal  Patient gave rough estimates of BG, mostly states \"all over the place\" but unable to give exact numbers. Dexcom sensor fell off in the shower and she forgot her reader today. Patient does not have a smart phone.   Does not state any hypoglycemic events  States her son may be able to assist, will have him join in next visit for education regarding dexcom sensor application  Helped patient apply dexcom sensor and walked her through with application guide - patient states some hesitation about being able to do it for herself at home. Also confused about refills. Directed patient to look at labels on presriptions to request refills, provided patient with [facility] phone number  Due to unknown BG numbers at this time will not adjust any of the patients medications.  Patient states she is no longer getting Jardiance from [facility], she did not call them. No recent application submitted. Will submit PAP paperwork for patient and upload into media. Patient states she can afford to  from pharmacy until PAP can be approved.   Patient has still been taking glimepiride since last visit - advised patient to stop now that she is on insulin therapy    - Medication changes:  STOP glimepiride  Resume Jardiance 25 mg daily  - Continue:  Metformin IR: 1000 mg BID   Basal Insulin: Glargine 10 units QHS     - Lifestyle changes:  Increase veggies/protein  Exercise as tolerated    Follow Up:  1 weeks - f/u to ensure that patient is able to set up dexcom reader and that sensor was able to stay on     Isaac OjedaD      CC:   Dr Berumen PCP      "

## 2024-02-16 ENCOUNTER — OFFICE VISIT (OUTPATIENT)
Dept: MEDICAL GROUP | Facility: PHYSICIAN GROUP | Age: 79
End: 2024-02-16
Payer: MEDICARE

## 2024-02-16 VITALS
DIASTOLIC BLOOD PRESSURE: 76 MMHG | BODY MASS INDEX: 25.8 KG/M2 | HEART RATE: 77 BPM | HEIGHT: 62 IN | SYSTOLIC BLOOD PRESSURE: 148 MMHG | WEIGHT: 140.21 LBS | TEMPERATURE: 97.6 F | RESPIRATION RATE: 12 BRPM | OXYGEN SATURATION: 93 %

## 2024-02-16 DIAGNOSIS — N17.9 AKI (ACUTE KIDNEY INJURY) (HCC): ICD-10-CM

## 2024-02-16 DIAGNOSIS — Z76.0 MEDICATION REFILL: ICD-10-CM

## 2024-02-16 DIAGNOSIS — E78.2 MIXED HYPERLIPIDEMIA: ICD-10-CM

## 2024-02-16 DIAGNOSIS — E11.40 TYPE 2 DIABETES MELLITUS WITH DIABETIC NEUROPATHY, WITHOUT LONG-TERM CURRENT USE OF INSULIN (HCC): ICD-10-CM

## 2024-02-16 DIAGNOSIS — E78.5 DYSLIPIDEMIA: ICD-10-CM

## 2024-02-16 DIAGNOSIS — T14.8XXD WOUND HEALING, DELAYED: ICD-10-CM

## 2024-02-16 DIAGNOSIS — M48.04 NEUROFORAMINAL STENOSIS OF THORACIC SPINE: ICD-10-CM

## 2024-02-16 DIAGNOSIS — I10 BENIGN ESSENTIAL HTN: ICD-10-CM

## 2024-02-16 PROCEDURE — 3078F DIAST BP <80 MM HG: CPT | Performed by: STUDENT IN AN ORGANIZED HEALTH CARE EDUCATION/TRAINING PROGRAM

## 2024-02-16 PROCEDURE — 99213 OFFICE O/P EST LOW 20 MIN: CPT | Performed by: STUDENT IN AN ORGANIZED HEALTH CARE EDUCATION/TRAINING PROGRAM

## 2024-02-16 PROCEDURE — 3077F SYST BP >= 140 MM HG: CPT | Performed by: STUDENT IN AN ORGANIZED HEALTH CARE EDUCATION/TRAINING PROGRAM

## 2024-02-16 RX ORDER — ATENOLOL 50 MG/1
50 TABLET ORAL DAILY
Qty: 100 TABLET | Refills: 1 | Status: SHIPPED | OUTPATIENT
Start: 2024-02-16 | End: 2024-03-27 | Stop reason: SDUPTHER

## 2024-02-16 RX ORDER — PRAVASTATIN SODIUM 10 MG
10 TABLET ORAL DAILY
Qty: 100 TABLET | Refills: 3 | Status: SHIPPED | OUTPATIENT
Start: 2024-02-16 | End: 2024-03-27 | Stop reason: SDUPTHER

## 2024-02-16 RX ORDER — AMLODIPINE BESYLATE 10 MG/1
10 TABLET ORAL DAILY
Qty: 100 TABLET | Refills: 1 | Status: SHIPPED | OUTPATIENT
Start: 2024-02-16 | End: 2024-03-27 | Stop reason: SDUPTHER

## 2024-02-16 RX ORDER — LISINOPRIL 40 MG/1
40 TABLET ORAL DAILY
Qty: 100 TABLET | Refills: 1 | Status: SHIPPED | OUTPATIENT
Start: 2024-02-16 | End: 2024-03-27 | Stop reason: SDUPTHER

## 2024-02-16 RX ORDER — EMPAGLIFLOZIN 25 MG/1
25 TABLET, FILM COATED ORAL DAILY
Qty: 90 TABLET | Refills: 3 | Status: SHIPPED | OUTPATIENT
Start: 2024-02-16 | End: 2024-03-27 | Stop reason: SDUPTHER

## 2024-02-16 ASSESSMENT — PATIENT HEALTH QUESTIONNAIRE - PHQ9: CLINICAL INTERPRETATION OF PHQ2 SCORE: 0

## 2024-02-16 NOTE — ASSESSMENT & PLAN NOTE
she is growing increasingly frustrated with her continuous glucose monitor.  I have advised her that it is causing that much complication for her that she can continue doing blood sugar checks using her lancets a.m. and 3 times a day after meals.  She will be following up with diabetic pharmacy services next week.  Continue Lantus 20 units nightly, metformin, Jardiance.

## 2024-02-16 NOTE — LETTER
[employer]  Bakari Berumen D.O.  [address]  [facility] [address]  Fax: [phone]   Authorization for Release/Disclosure of   Protected Health Information   Name: ZENAIDA SCOTT PATRICE : 1945 SSN: [id number]   Address: [facility] [address]  [place] [address] Phone:    [phone] (home)    I authorize the entity listed below to release/disclose the PHI below to:   [employer]/Bakari Berumen D.O. and Bakari Berumen D.O.   Provider or Entity Name:  [facility]    Address   City, State, [facility]   Phone:      Fax:     Reason for request: continuity of care   Information to be released:    [  ] LAST COLONOSCOPY,  including any PATH REPORT and follow-up  [  ] LAST FIT/COLOGUARD RESULT [  ] LAST DEXA  [  ] LAST MAMMOGRAM  [  ] LAST PAP  [  ] LAST LABS [  ] RETINA EXAM REPORT  [  ] IMMUNIZATION RECORDS  [  ] Release all info      [  ] Check here and initial the line next to each item to release ALL health information INCLUDING  _____ Care and treatment for drug and / or alcohol abuse  _____ HIV testing, infection status, or AIDS  _____ Genetic Testing    DATES OF SERVICE OR TIME PERIOD TO BE DISCLOSED: _____________  I understand and acknowledge that:  * This Authorization may be revoked at any time by you in writing, except if your health information has already been used or disclosed.  * Your health information that will be used or disclosed as a result of you signing this authorization could be re-disclosed by the recipient. If this occurs, your re-disclosed health information may no longer be protected by State or Federal laws.  * You may refuse to sign this Authorization. Your refusal will not affect your ability to obtain treatment.  * This Authorization becomes effective upon signing and will  on (date) __________.      If no date is indicated, this Authorization will  one (1) year from the signature date.    Name: Zenaida SCOTT Patrice  Signature: Date:    2/16/2024     PLEASE FAX REQUESTED RECORDS BACK TO: [phone]

## 2024-02-16 NOTE — PROGRESS NOTES
HISTORY OF PRESENT ILLNESS: Zeniada SCOTT is a pleasant 78 y.o. female, established patient who presents today to discuss medical problems as listed below:    Problem   Neuroforaminal Stenosis of Thoracic Spine   Wound Healing, Delayed    Has 1 large leg ulcer on her left leg, 3 smaller ulcers on her right leg.  These are healing well and she is seeing wound care for this.  For the problem is her diabetes which sugars are still elevated.     Type 2 Diabetes Mellitus With Diabetic Neuropathy, Without Long-Term Current Use of Insulin ([facility])    Currently on metformin 1000 mg twice daily, Jardiance 25 mg daily, long-acting insulin 20 units every evening    She has tried Rybelsus in the past with side effects    Here to review recent labs showing an A1c of 9.4.  She is following up with diabetic pharmacy services.          Current Outpatient Medications Ordered in Epic   Medication Sig Dispense Refill    amLODIPine (NORVASC) 10 MG Tab Take 1 Tablet by mouth every day. 100 Tablet 1    atenolol (TENORMIN) 50 MG Tab Take 1 tablet by mouth every day. 100 Tablet 1    Empagliflozin (JARDIANCE) 25 MG Tab Take 1 tablet (25 mg) by mouth every day. 90 Tablet 3    pravastatin (PRAVACHOL) 10 MG Tab Take 1 tablet by mouth daily 100 Tablet 3    lisinopril (PRINIVIL) 40 MG tablet Take 1 Tablet by mouth every day. 100 Tablet 1    Continuous Blood Gluc Sensor (DEXCOM G7 SENSOR) Misc Use 1 each every 10 days. Use to test BG as directed. 3 Each 11    Continuous Blood Gluc  (DEXCOM G7 ) Device Use 1 each every day. Use as directed to monitor BG. 1 Each 0    Insulin Glargine, 1 Unit Dial, (TOUJEO SOLOSTAR) 300 UNIT/ML Solution Pen-injector Inject 10 Units under the skin every evening. 1.5 mL 1    Insulin Pen Needle 32 G x 4 mm Use one pen needle in pen device to inject insulin once daily. 100 Each 1    Insulin Glargine-yfgn 100 UNIT/ML Solution Pen-injector INJECT 10 UNITS UNDER THE SKIN DAILY OR AS DIRECTED (MAX 50 UNITS/DAY) 20  mL 1    therapeutic multivitamin-minerals (THERAGRAN-M) Tab Take 1 Tablet by mouth every day.      gabapentin (NEURONTIN) 100 MG Cap Take 1 Capsule by mouth 3 times a day. (Patient taking differently: Take 10 mg by mouth every day.) 90 Capsule 0    metFORMIN (GLUCOPHAGE) 500 MG Tab Take 2 Tablets by mouth 2 times a day with meals. 100 Tablet 13    vitamin D (CHOLECALCIFEROL) 1000 UNIT Tab Take 1,000 Units by mouth every day.      glucose blood strip 1 Each by Other route as needed.      STOOL SOFTENER PO Take 1 Tab by mouth every day.       No current [facility]-ordered facility-administered medications on file.       Review of systems:  -Chest pain, shortness of breath, edema, fevers    Patient Active Problem List    Diagnosis Date Noted    Neuroforaminal stenosis of thoracic spine 12/28/2023    Wound healing, delayed 11/29/2023    Poor dentition 11/29/2023    ELICEO (acute kidney injury) (HCC) 10/24/2023    Wound cellulitis 10/24/2023    History of lumbar fusion 08/15/2023    Sciatic nerve pain, right 08/15/2023    On economic assistance program 05/24/2023    Healthcare maintenance 06/01/2021    Dyslipidemia 11/19/2019    Type 2 diabetes mellitus with diabetic neuropathy, without long-term current use of insulin ([facility])     Neuropathy 01/17/2019    Vitamin D deficiency 01/17/2019    Obesity (BMI 30-39.9) 07/17/2018    Benign essential HTN 05/21/2018     Past Surgical History:   Procedure Laterality Date    FUSION, SPINE, LUMBAR, PLIF  1/11/2010    Performed by LYNNETTE SMILEY at SURGERY [facility]    LAMINOTOMY  1/11/2010    Performed by LYNNETTE SMILEY at SURGERY [facility]    LUMBAR LAMINECTOMY DISKECTOMY  8/31/2009    OTHER ORTHOPEDIC SURGERY  1989    tibia has a tanna knee to ankle, fibula is resected    PRIMARY C SECTION  1988    OTHER  1975    left lumpectomt in breast - benign    OTHER       Social History     Tobacco Use    Smoking status: Never    Smokeless tobacco: Never   Vaping Use    Vaping Use: Never  used   Substance Use Topics    Alcohol use: No    Drug use: No      Family History   Adopted: Yes     Current Outpatient Medications   Medication Sig Dispense Refill    amLODIPine (NORVASC) 10 MG Tab Take 1 Tablet by mouth every day. 100 Tablet 1    atenolol (TENORMIN) 50 MG Tab Take 1 tablet by mouth every day. 100 Tablet 1    Empagliflozin (JARDIANCE) 25 MG Tab Take 1 tablet (25 mg) by mouth every day. 90 Tablet 3    pravastatin (PRAVACHOL) 10 MG Tab Take 1 tablet by mouth daily 100 Tablet 3    lisinopril (PRINIVIL) 40 MG tablet Take 1 Tablet by mouth every day. 100 Tablet 1    Continuous Blood Gluc Sensor (DEXCOM G7 SENSOR) Misc Use 1 each every 10 days. Use to test BG as directed. 3 Each 11    Continuous Blood Gluc  (DEXCOM G7 ) Device Use 1 each every day. Use as directed to monitor BG. 1 Each 0    Insulin Glargine, 1 Unit Dial, (TOUJEO SOLOSTAR) 300 UNIT/ML Solution Pen-injector Inject 10 Units under the skin every evening. 1.5 mL 1    Insulin Pen Needle 32 G x 4 mm Use one pen needle in pen device to inject insulin once daily. 100 Each 1    Insulin Glargine-yfgn 100 UNIT/ML Solution Pen-injector INJECT 10 UNITS UNDER THE SKIN DAILY OR AS DIRECTED (MAX 50 UNITS/DAY) 20 mL 1    therapeutic multivitamin-minerals (THERAGRAN-M) Tab Take 1 Tablet by mouth every day.      gabapentin (NEURONTIN) 100 MG Cap Take 1 Capsule by mouth 3 times a day. (Patient taking differently: Take 10 mg by mouth every day.) 90 Capsule 0    metFORMIN (GLUCOPHAGE) 500 MG Tab Take 2 Tablets by mouth 2 times a day with meals. 100 Tablet 13    vitamin D (CHOLECALCIFEROL) 1000 UNIT Tab Take 1,000 Units by mouth every day.      glucose blood strip 1 Each by Other route as needed.      STOOL SOFTENER PO Take 1 Tab by mouth every day.       No current facility-administered medications for this visit.       Allergies:  Allergies   Allergen Reactions    Hydromorphone Rash    Morphine Rash    Oxycodone-Acetaminophen Unspecified     "Percocet [Oxycodone-Acetaminophen] Swelling    Demerol     Statins [Hmg-Coa-R Inhibitors]        Allergies, past medical history, past surgical history, family history, social history reviewed and updated.    Objective:    BP (!) 148/76 (BP Location: Right arm, Patient Position: Sitting, BP Cuff Size: Adult)   Pulse 77   Temp 36.4 °C (97.6 °F) (Temporal)   Resp 12   Ht 1.575 m (5' 2\")   Wt 63.6 kg (140 lb 3.4 oz)   SpO2 93%   BMI 25.65 kg/m²    Body mass index is 25.65 kg/m².    Physical exam:  General: Normal appearance, no acute distress, not ill-appearing  HEENT: EOM intact, conjunctiva normal limits, negative right/left eye discharge.  Sclera anicteric  Cardiovascular: Normal rate and rhythm, no murmurs  Pulmonary: No respiratory distress, no wheezing, no rales, breath sounds normal.  Abdomen: Bowel sounds normal, flat, soft.  Musculoskeletal: No edema bilaterally  Skin: Left leg ulcer with no significant erythema or discharge.  Healing well about 7 cm diameter.  3x 2 cm diameter ulcers on her right shin no erythema or discharge.  Neurological: No focal deficits, normal gait  Psychiatric: Mood within normal limits    Assessment/Plan:    Problem List Items Addressed This Visit       Benign essential HTN    Relevant Medications    amLODIPine (NORVASC) 10 MG Tab    atenolol (TENORMIN) 50 MG Tab    pravastatin (PRAVACHOL) 10 MG Tab    lisinopril (PRINIVIL) 40 MG tablet    Type 2 diabetes mellitus with diabetic neuropathy, without long-term current use of insulin (HCC)     she is growing increasingly frustrated with her continuous glucose monitor.  I have advised her that it is causing that much complication for her that she can continue doing blood sugar checks using her lancets a.m. and 3 times a day after meals.  She will be following up with diabetic pharmacy services next week.  Continue Lantus 20 units nightly, metformin, Jardiance.         Relevant Medications    Empagliflozin (JARDIANCE) 25 MG Tab    " Dyslipidemia    Relevant Medications    pravastatin (PRAVACHOL) 10 MG Tab    ELICEO (acute kidney injury) (HCC)    Relevant Orders    Basic Metabolic Panel    Wound healing, delayed     Continue to follow-up with wound care  Most important is to get her sugars down.  She was a started on insulin.  Will be following up with diabetic pharmacy services next week for blood sugar log review    Consider arterial duplex          Neuroforaminal stenosis of thoracic spine     Records requested from [facility] neurosurgery group          Other Visit Diagnoses       Mixed hyperlipidemia        Relevant Medications    amLODIPine (NORVASC) 10 MG Tab    atenolol (TENORMIN) 50 MG Tab    pravastatin (PRAVACHOL) 10 MG Tab    lisinopril (PRINIVIL) 40 MG tablet    Medication refill        Relevant Medications    amLODIPine (NORVASC) 10 MG Tab    atenolol (TENORMIN) 50 MG Tab    pravastatin (PRAVACHOL) 10 MG Tab    lisinopril (PRINIVIL) 40 MG tablet           HCC Gap Form    Diagnosis to address: E11.40 - Type 2 diabetes mellitus with diabetic neuropathy, without long-term current use of insulin (HCC)  Assessment and plan: Chronic, exacerbated. Treatment and follow up: Last A1c 9.7.  She is following up with diabetic pharmacy services.  He was just started on insulin long-acting at night 20 units.  On Jardiance 25 mg daily.,  Metformin 1000 mg twice daily.  Last edited 02/16/24 15:21 PST by Bakari Berumen D.O.           Return in about 6 weeks (around 3/29/2024) for diabetes.

## 2024-02-16 NOTE — ASSESSMENT & PLAN NOTE
Continue to follow-up with wound care  Most important is to get her sugars down.  She was a started on insulin.  Will be following up with diabetic pharmacy services next week for blood sugar log review    Consider arterial duplex

## 2024-02-21 ENCOUNTER — OFFICE VISIT (OUTPATIENT)
Dept: MEDICAL GROUP | Facility: PHYSICIAN GROUP | Age: 79
End: 2024-02-21
Payer: MEDICARE

## 2024-02-21 DIAGNOSIS — E11.40 TYPE 2 DIABETES MELLITUS WITH DIABETIC NEUROPATHY, WITHOUT LONG-TERM CURRENT USE OF INSULIN (HCC): ICD-10-CM

## 2024-02-21 PROCEDURE — 99211 OFF/OP EST MAY X REQ PHY/QHP: CPT | Performed by: PHYSICIAN ASSISTANT

## 2024-02-21 NOTE — PATIENT INSTRUCTIONS
Increase Toujeo to 11 units every night at bedtime    Bring in your income documentation to the office for the pharmacist to fax to

## 2024-02-21 NOTE — PROGRESS NOTES
"Patient Consult Note    TIME IN: 11:00 am  TIME OUT: 11:30 am    Primary care physician: Bakari Berumen D.O.    Reason for consult: Management of Uncontrolled Type 2 Diabetes    HPI:  Zenaida Rios is a 78 y.o. old patient who comes in today for evaluation of above stated problem.    Allergies  Hydromorphone, Morphine, Oxycodone-acetaminophen, Percocet [oxycodone-acetaminophen], Demerol, and Statins [hmg-coa-r inhibitors]    Current Diabetes Medication Regimen  Metformin IR: 1000 mg BID  SGLT-2 Inhibitor: empagliflozin (Jardiance) 25 mg daily    Basal Insulin: glargine 10 units QHS    Previous Diabetes Medications and Reason for Discontinuation  Pioglitazone - pt states \"horrible reaction\" many years ago that she cannot recall   Rybelsus - lethargy, fatigue, and abd pain. Pt states she restarted w/ 7 mg and was able to tolerate.    Potential Barriers to Care:  Adherence: denies missed doses  Side effects: none  Affordability: Pt receives Jardiance and Rybelsus via PAP, metformin and glargine affordable    SMBG  Pt has home glucometer and proper testing technique - Yes. Pt tried CGM - prefers to just monitor manually.  Discussed BG Goals: FBG 80 - 130, 2hPP < 180, A1c < 7%    Pt reports blood sugars:   Before Breakfast: 140-150    Hypoglycemia  Hypoglycemia awareness: No   Nocturnal hypoglycemia: None  Hypoglycemia:  None  Pt's treatment of Hypoglycemia  Discussed 15:15 Rule    Lifestyle  Current Exercise - Has had more pain in her legs preventing her from exercise. Reports recent GLF. Has wound on her leg that is limiting to physical activity.  Exercise Goal - Increase to goal of 30 min 5x/week     Dietary:   Breakfast - None typically  Lunch - Cheese sticks, sandwich (PB), \"whatever is in the refigerator\"  Dinner - Cooks meals at home. Typically meat, potato, lots of vegetables. Occasionally skips.  Snacks - Cheese, crackers  Desserts - Avoids sweets mostly  Drinks - Coffee w/ creamer, " water    Preventative Management  BP regimen (ACEi/ARB): Lisinopril 40 mg once daily  Statin: pravastatin (Pravachol) 10 mg daily  Last Microalbumin/Cr:  Lab Results   Component Value Date/Time    MALBCRT 82 (H) 07/20/2021 08:15 AM    MICROALBUR 5.4 07/20/2021 08:15 AM     Last A1c:  Lab Results   Component Value Date/Time    HBA1C 9.7 (A) 12/06/2023 09:30 AM      Last Retinal Scan: 9/2023    Monofilament exam: Completed 8/2023     Labs  Lab Results   Component Value Date/Time    SODIUM 139 07/15/2021 07:40 AM    POTASSIUM 4.5 07/15/2021 07:40 AM    CHLORIDE 103 07/15/2021 07:40 AM    CO2 22 07/15/2021 07:40 AM    GLUCOSE 131 (H) 07/15/2021 07:40 AM    BUN 16 07/15/2021 07:40 AM    CREATININE 0.91 07/15/2021 07:40 AM     Lab Results   Component Value Date/Time    ALKPHOSPHAT 75 07/15/2021 07:40 AM    ASTSGOT 27 07/15/2021 07:40 AM    ALTSGPT 24 07/15/2021 07:40 AM    TBILIRUBIN 0.3 07/15/2021 07:40 AM    ALBUMIN 4.6 07/15/2021 07:40 AM        Physical Examination:  Vital signs: There were no vitals taken for this visit. There is no height or weight on file to calculate BMI.    Assessment and Plan:    1. DM2  Pt presents to clinic stating that she's had many issues w/ her Dexcom. She does not wish to use any CGM moving forward.   Advised pt that we can stick w/ manual BG checks moving forward.  Of note - pt states she never asked for CGM; however, on 1/17/23, the pt did ask that I order CGM for her. Placed orders accordingly at that time.  Discussed Goals: FBG 80 - 130, 2hPP < 180, a1c < 7.0%   Most recent a1c drawn on 12/6/23 was 9.7%, which is not at goal and increased from last (8.4% on 8/2023).  Pt reported FBG are much improved since last visit. No issues w/ hypoglycemia at this time.  Pt continues to have issues obtaining her Jardiance and Rybelsus PAP. It appears that  just needs income documentation - pt will present to clinic. Will fax off ASAP upon receipt.  Pt previously stated she could not  tolerated Rybelsus, but today states she was able to tolerate the 7mg w/ no issues. States it's a good drug. She refuses to p/u in [place].    - Medication changes:  INCREASE basaglar (Toujeo) to 11 units QHS (10% increase)  - Continue:  Metformin 1000 mg BID  Jardiance 25 mg once daily    - Lifestyle changes:  Diet: Maximize lean proteins and veggies. Cut out/down on carbs. Avoid simple sugars.   Exercise: Increase as tolerated    Follow Up:  2 weeks    Manuel Owens, PharmD, BCACP    CC:   Bakari Berumen D.O.

## 2024-02-21 NOTE — PROGRESS NOTES
Refaxed PAP apps for both Jardiance and Rybelsus today. Will have 2023 financials scanned into media.    Manuel Owens, IsaacD, BCACP

## 2024-02-22 ENCOUNTER — DOCUMENTATION (OUTPATIENT)
Dept: VASCULAR LAB | Facility: MEDICAL CENTER | Age: 79
End: 2024-02-22
Payer: MEDICARE

## 2024-02-23 ENCOUNTER — TELEPHONE (OUTPATIENT)
Dept: MEDICAL GROUP | Facility: PHYSICIAN GROUP | Age: 79
End: 2024-02-23
Payer: MEDICARE

## 2024-02-23 NOTE — TELEPHONE ENCOUNTER
Received notification from [employer] that copy of current insurance information is needed.  Faxed back copy of current [employer] cards.  Bob Vo, PharmD, BCACP

## 2024-02-27 ENCOUNTER — OFFICE VISIT (OUTPATIENT)
Dept: MEDICAL GROUP | Facility: PHYSICIAN GROUP | Age: 79
End: 2024-02-27
Payer: MEDICARE

## 2024-02-27 VITALS
HEIGHT: 62 IN | DIASTOLIC BLOOD PRESSURE: 70 MMHG | TEMPERATURE: 96.6 F | HEART RATE: 70 BPM | SYSTOLIC BLOOD PRESSURE: 110 MMHG | BODY MASS INDEX: 25.65 KG/M2 | RESPIRATION RATE: 20 BRPM | OXYGEN SATURATION: 97 %

## 2024-02-27 DIAGNOSIS — I10 PRIMARY HYPERTENSION: ICD-10-CM

## 2024-02-27 DIAGNOSIS — E11.42 TYPE 2 DIABETES MELLITUS WITH DIABETIC POLYNEUROPATHY, WITH LONG-TERM CURRENT USE OF INSULIN (HCC): ICD-10-CM

## 2024-02-27 DIAGNOSIS — E78.5 HYPERLIPIDEMIA, UNSPECIFIED HYPERLIPIDEMIA TYPE: ICD-10-CM

## 2024-02-27 DIAGNOSIS — Z79.4 TYPE 2 DIABETES MELLITUS WITH DIABETIC POLYNEUROPATHY, WITH LONG-TERM CURRENT USE OF INSULIN (HCC): ICD-10-CM

## 2024-02-27 DIAGNOSIS — N18.9 CHRONIC KIDNEY DISEASE, UNSPECIFIED CKD STAGE: ICD-10-CM

## 2024-02-27 DIAGNOSIS — E55.9 VITAMIN D DEFICIENCY: ICD-10-CM

## 2024-02-27 PROBLEM — M54.31 SCIATIC NERVE PAIN, RIGHT: Status: RESOLVED | Noted: 2023-08-15 | Resolved: 2024-02-27

## 2024-02-27 PROBLEM — Z00.00 HEALTHCARE MAINTENANCE: Status: RESOLVED | Noted: 2021-06-01 | Resolved: 2024-02-27

## 2024-02-27 PROCEDURE — 3078F DIAST BP <80 MM HG: CPT | Performed by: NURSE PRACTITIONER

## 2024-02-27 PROCEDURE — 3074F SYST BP LT 130 MM HG: CPT | Performed by: NURSE PRACTITIONER

## 2024-02-27 PROCEDURE — 99214 OFFICE O/P EST MOD 30 MIN: CPT | Performed by: NURSE PRACTITIONER

## 2024-02-27 RX ORDER — GLIMEPIRIDE 2 MG/1
4 TABLET ORAL
COMMUNITY
End: 2024-03-27

## 2024-02-27 ASSESSMENT — ENCOUNTER SYMPTOMS
FEVER: 0
ABDOMINAL PAIN: 0
DOUBLE VISION: 0
HEADACHES: 0
BLURRED VISION: 0
DIARRHEA: 0
BACK PAIN: 0
NAUSEA: 0
CONSTIPATION: 0
FOCAL WEAKNESS: 1
PSYCHIATRIC NEGATIVE: 1
WEIGHT LOSS: 0
COUGH: 0
VOMITING: 0
SHORTNESS OF BREATH: 0
DIZZINESS: 0
MYALGIAS: 0
CHILLS: 0
PALPITATIONS: 0

## 2024-02-27 NOTE — PROGRESS NOTES
I attest that I saw the patient with Tiny THORNE student. I performed a physical exam and medical decision making. I reviewed, verified the documentation and agree with the content and plan as written by the nurse practitioner student.       Family Nurse Practitioner Student Note    Cosigner/Preceptor: MATI Wiley    Chief Complaint:  Establish care.                                                                                                                                 HTN: Pt has been diagnosed with HTN at age for 20+ years.  Takes amlodipine 10 mg daily, atenolol 50 mg daily, lisinopril  40 mg daily, in the morning.  Unknown control at home, pt does have a wrist cuff, does  not take her BP.  Denies symptoms of dizziness, H/A    DM II:  Pt has been diabetic for many years.   Takes Jardiance 25 mg daily, glimepiride 4 mg daily, metformin 1000 mg BID.  Insulin glargine 11 units daily. Was 10 units previously started 1.5 months ago. Pharmacotherapist started her on insulin.  Sees pharmacotherapy every 2-3 weeks.  Daily BS checks BS daily, not well controlled per pt.  Pt states generally BS is under 200, it is random, average highs in the am around 140-160.  Pt eats cereal in the am, cherrios, frosted flakes, no lunch usually at work, does not snack during the day at work. Dinner is not regulated, can vary. Last night take out pasta.  Education on increased proteins and vegetables, and decrease carbohydrates and sugars.    Neuropathy related to diabetes:   Bilateral feet, mild numbness tingling, very mild, only effects her on a rare occasion.  Has tried gabapentin, does have a prescription, but does not take it due to listed side effects.     HLD:   Long history of HLD, many years.  Pt was allergic to other unknown statins, pt can't remember what the problem was with other statins.  Currently taking pravastatin 10 mg daily.    Benita D deficiency  Know history. Taking vitamin d daily.    Chronic  kidney disease   Pt is unaware she has diagnosis of kidney disease.  Pt denies urinary symptoms or flank pain.    RLE weakness:   Chronic weakness to right lower extremity, with extreme difficulty walking, numbness/tingling prior to laminectomy in 2010.  Laminectomy on L3, 4, 5, S1 via Dr. Zheng.  Post surgery pt was able to walk, and has ongoing improvement.Walks without assistance, slight food drag.  However, slight new increased weakness 1 month ago, when the dog laid on her leg.   Now using a walker for stability for 1 month.  No pain, no new or increased numbness and tingling.    Chronic wound   RLE x 3 wounds drying with scab, no sings of drainage, slight redness surrounding wounds.    1 wound LLE, covered with bandage, no signs of drainage, clean, dry, intact.  Wounds started about 2 months ago.    Healing, [facility] wound care.    HPI:   Review of Systems   Constitutional:  Negative for chills, fever, malaise/fatigue and weight loss.   HENT:  Positive for hearing loss.    Eyes:  Negative for blurred vision and double vision.        Glasses, sees ophthalmology 6 months ago.   Respiratory:  Negative for cough and shortness of breath.    Cardiovascular:  Negative for chest pain, palpitations and leg swelling.   Gastrointestinal:  Negative for abdominal pain, constipation, diarrhea, nausea and vomiting.   Genitourinary: Negative.    Musculoskeletal:  Negative for back pain, joint pain and myalgias.   Skin:  Negative for rash.   Neurological:  Positive for focal weakness. Negative for dizziness and headaches.        RLE weakness chronic since 2010 after laminectomy.   Psychiatric/Behavioral: Negative.           Current Outpatient Medications   Medication Sig Dispense Refill    glimepiride (AMARYL) 2 MG Tab Take 4 mg by mouth.      amLODIPine (NORVASC) 10 MG Tab Take 1 Tablet by mouth every day. 100 Tablet 1    atenolol (TENORMIN) 50 MG Tab Take 1 tablet by mouth every day. 100 Tablet 1    Empagliflozin  (JARDIANCE) 25 MG Tab Take 1 tablet (25 mg) by mouth every day. 90 Tablet 3    pravastatin (PRAVACHOL) 10 MG Tab Take 1 tablet by mouth daily 100 Tablet 3    lisinopril (PRINIVIL) 40 MG tablet Take 1 Tablet by mouth every day. 100 Tablet 1    Insulin Glargine, 1 Unit Dial, (TOUJEO SOLOSTAR) 300 UNIT/ML Solution Pen-injector Inject 10 Units under the skin every evening. (Patient taking differently: Inject 11 Units under the skin every evening.) 1.5 mL 1    Insulin Pen Needle 32 G x 4 mm Use one pen needle in pen device to inject insulin once daily. 100 Each 1    Insulin Glargine-yfgn 100 UNIT/ML Solution Pen-injector INJECT 10 UNITS UNDER THE SKIN DAILY OR AS DIRECTED (MAX 50 UNITS/DAY) (Patient taking differently: Inject 11 Units under the skin at bedtime. INJECT 11 UNITS UNDER THE SKIN DAILY OR AS DIRECTED (MAX 50 UNITS/DAY)) 20 mL 1    therapeutic multivitamin-minerals (THERAGRAN-M) Tab Take 1 Tablet by mouth every day.      gabapentin (NEURONTIN) 100 MG Cap Take 1 Capsule by mouth 3 times a day. (Patient taking differently: Take 10 mg by mouth every day.) 90 Capsule 0    metFORMIN (GLUCOPHAGE) 500 MG Tab Take 2 Tablets by mouth 2 times a day with meals. 100 Tablet 13    vitamin D (CHOLECALCIFEROL) 1000 UNIT Tab Take 1,000 Units by mouth every day.      glucose blood strip 1 Each by Other route as needed.      STOOL SOFTENER PO Take 1 Tab by mouth every day.       No current facility-administered medications for this visit.       Allergies as of 02/27/2024 - Reviewed 02/27/2024   Allergen Reaction Noted    Hydromorphone Rash 11/30/2023    Morphine Rash 11/30/2023    Oxycodone-acetaminophen Unspecified 11/30/2023    Percocet [oxycodone-acetaminophen] Swelling 01/11/2010    Demerol  08/31/2009    Statins [hmg-coa-r inhibitors]  08/31/2009        ROS:  All systems negative expect as addressed in assessment and plan.     /70 (BP Location: Left arm, Patient Position: Sitting, BP Cuff Size: Adult)   Pulse 70    "Temp 35.9 °C (96.6 °F) (Temporal)   Resp 20   Ht 1.575 m (5' 2\")   SpO2 97%   BMI 25.65 kg/m²     Physical Exam  Constitutional:       Appearance: Normal appearance.   HENT:      Head: Normocephalic and atraumatic.   Eyes:      Extraocular Movements: Extraocular movements intact.      Conjunctiva/sclera: Conjunctivae normal.      Pupils: Pupils are equal, round, and reactive to light.   Cardiovascular:      Rate and Rhythm: Normal rate and regular rhythm.      Pulses: Normal pulses.      Heart sounds: Normal heart sounds.   Pulmonary:      Effort: Pulmonary effort is normal.      Breath sounds: Normal breath sounds.   Abdominal:      General: Bowel sounds are normal.      Palpations: Abdomen is soft.   Musculoskeletal:         General: No swelling or tenderness.   Feet:      Comments: Slight right foot drag with ambulation  Skin:     General: Skin is warm and dry.      Capillary Refill: Capillary refill takes less than 2 seconds.      Findings: Lesion present.   Neurological:      Mental Status: She is alert. Mental status is at baseline.      Sensory: Sensory deficit present.      Gait: Gait abnormal.      Comments: Chronic abnormal gait right foot   Psychiatric:         Mood and Affect: Mood normal.         Behavior: Behavior normal.      Comments: Forgetful         Assessment and Plan:  78 y.o. female with the following issues.    1. Type 2 diabetes mellitus with diabetic polyneuropathy, with long-term current use of insulin ([facility])  -Continue appoint with pharmacotherapy on 3/6/2024.  -Monitor blood sugar daily at the same time.  -Bring daily log.  -Increase proteins and vegetables, avoid carbohydrates and excess sugars.  -Will monitor labs  -Bring insulin pen next visit.    2. Primary hypertension  -Take BP twice a day at the same time.  -Bring BP log (given to pt).    3. Hyperlipidemia, unspecified hyperlipidemia type  -Will monitor labs.  -Continue Pravastatin    4. Vitamin D deficiency  -Continue Vitamin D " dialy    5. Chronic kidney disease, unspecified CKD stage  -Stay well hydrated.  -Avoid caffeine, increase Po water.  -Will monitor labs.      Return in about 2 weeks (around 3/12/2024) for follow up DM.    Please note that this dictation was created using voice recognition software. I have worked with consultants from the vendor as well as technical experts from [facility] to optimize the interface. I have made every reasonable attempt to correct obvious errors, but I expect that there are errors of grammar and possibly content that I did not discover before finalizing the note.

## 2024-03-06 ENCOUNTER — OFFICE VISIT (OUTPATIENT)
Dept: MEDICAL GROUP | Facility: PHYSICIAN GROUP | Age: 79
End: 2024-03-06
Payer: MEDICARE

## 2024-03-06 ENCOUNTER — TELEPHONE (OUTPATIENT)
Dept: HEALTH INFORMATION MANAGEMENT | Facility: OTHER | Age: 79
End: 2024-03-06

## 2024-03-06 VITALS — HEIGHT: 62 IN | WEIGHT: 140 LBS | BODY MASS INDEX: 25.76 KG/M2 | RESPIRATION RATE: 14 BRPM

## 2024-03-06 DIAGNOSIS — E11.42 TYPE 2 DIABETES MELLITUS WITH DIABETIC POLYNEUROPATHY, WITH LONG-TERM CURRENT USE OF INSULIN (HCC): ICD-10-CM

## 2024-03-06 DIAGNOSIS — Z79.4 TYPE 2 DIABETES MELLITUS WITH DIABETIC POLYNEUROPATHY, WITH LONG-TERM CURRENT USE OF INSULIN (HCC): ICD-10-CM

## 2024-03-06 LAB
HBA1C MFR BLD: 9.4 % (ref ?–5.8)
POCT INT CON NEG: NEGATIVE
POCT INT CON POS: POSITIVE

## 2024-03-06 PROCEDURE — 99211 OFF/OP EST MAY X REQ PHY/QHP: CPT | Performed by: STUDENT IN AN ORGANIZED HEALTH CARE EDUCATION/TRAINING PROGRAM

## 2024-03-06 PROCEDURE — 83036 HEMOGLOBIN GLYCOSYLATED A1C: CPT | Performed by: STUDENT IN AN ORGANIZED HEALTH CARE EDUCATION/TRAINING PROGRAM

## 2024-03-06 NOTE — PROGRESS NOTES
"Patient Consult Note    TIME IN: 1105  TIME OUT: 1130    Primary care physician: KIERA Mcclain    Reason for consult: Management of Uncontrolled Type 2 Diabetes    HPI:  Zenaida Rios is a 78 y.o. old patient who comes in today for evaluation of above stated problem.    Allergies  Hydromorphone, Morphine, Oxycodone-acetaminophen, Percocet [oxycodone-acetaminophen], Demerol, and Statins [hmg-coa-r inhibitors]    Current Diabetes Medication Regimen  Metformin IR: 1000 mg BID  SGLT-2 Inhibitor: empagliflozin (Jardiance) 25 mg daily     Basal Insulin: glargine 11 units Q AM    Previous Diabetes Medications and Reason for Discontinuation  Glimeperide - stopped  Pioglitazone - pt states \"horrible reaction\" many years ago that she cannot recall   Rybelsus - lethargy, fatigue, and abd pain. However, can tolerate 7 mg    Potential Barriers to Care:  Adherence: reports missed doses  Has been out of [employer] for a month  Side effects: none  Affordability:   Awaiting [employer] PAP status - pt picked up Jardiance $176/30ds  Rachel approved Rybelsus PAP 2/28/24, will be shipped 10-14 business days  SMBG  Pt has home glucometer and proper testing technique - yes  Discussed BG Goals: FBG 80 - 130, 2hPP < 180, A1c < 7.5% given age & difficulty to achieve glycemic control d/t cost of medications    Pt reports blood sugars:   Before Breakfast: 111-150      Hypoglycemia  Hypoglycemia awareness: Yes  Nocturnal hypoglycemia: None  Hypoglycemia:  None  Pt's treatment of Hypoglycemia  Discussed 15:15 Rule    Lifestyle  Current Exercise - Still c/o RLE pain preventing her from exercise. Has wound on her RLE that is limiting to physical activity. Has been able to bear more weight  Exercise Goal - Increase as tolerated    Dietary - unchanged since last visit  Breakfast - None typically  Lunch - Cheese sticks, sandwich (PB), \"whatever is in the refigerator\"  Dinner - Cooks meals at home. Typically meat, potato, lots of " vegetables. Occasionally skips.  Snacks - Cheese, crackers  Desserts - Avoids sweets mostly  Drinks - Coffee w/ creamer, water    Labs  Lab Results   Component Value Date/Time    HBA1C 9.4 (A) 03/06/2024 11:28 AM      Lab Results   Component Value Date/Time    SODIUM 139 07/15/2021 07:40 AM    POTASSIUM 4.5 07/15/2021 07:40 AM    CHLORIDE 103 07/15/2021 07:40 AM    CO2 22 07/15/2021 07:40 AM    GLUCOSE 131 (H) 07/15/2021 07:40 AM    BUN 16 07/15/2021 07:40 AM    CREATININE 0.91 07/15/2021 07:40 AM     Lab Results   Component Value Date/Time    ALKPHOSPHAT 75 07/15/2021 07:40 AM    ASTSGOT 27 07/15/2021 07:40 AM    ALTSGPT 24 07/15/2021 07:40 AM    TBILIRUBIN 0.3 07/15/2021 07:40 AM    ALBUMIN 4.6 07/15/2021 07:40 AM        Physical Examination:  Vital signs: There were no vitals taken for this visit. There is no height or weight on file to calculate BMI.    Assessment and Plan:    1. DM2  Discussed Goals: FBG 80 - 130, 2hPP < 180, a1c < 7.5% given age & difficulty to achieve glycemic control d/t cost of medications  Last a1c drawn on 3/6/24 was 9.4%, which is not at goal  Pt unable to get Rybelsus for the past month, which is why A1c worsened  Received letter of approval from [employer] 2/28/24 - sent to [employer] to scan in media  Will await shipment of Rybelsus 14mg daily - she is unable to drive to [place] - wishes us to bring it to [place] when it arrives  Will not change medications today    - Medication changes:  Restart Rybelsus 14mg daily upon retrieval from [facility]   - Continue:  Jardiance 25mg daily  Insulin glargine 11 units qAM     - Lifestyle changes:  Continue to increase physical activity as tolerated    - Preventative management:  REC DM Score: 0  Care gaps addressed: n/a  Care gaps updated in Health Maintenance    Follow Up:  6 weeks    Denise Adan, PharmD    CC:   KIERA Mcclain

## 2024-03-27 ENCOUNTER — OFFICE VISIT (OUTPATIENT)
Dept: MEDICAL GROUP | Facility: PHYSICIAN GROUP | Age: 79
End: 2024-03-27
Payer: MEDICARE

## 2024-03-27 VITALS
DIASTOLIC BLOOD PRESSURE: 60 MMHG | BODY MASS INDEX: 27.79 KG/M2 | HEART RATE: 60 BPM | TEMPERATURE: 97.1 F | OXYGEN SATURATION: 97 % | RESPIRATION RATE: 16 BRPM | HEIGHT: 62 IN | SYSTOLIC BLOOD PRESSURE: 118 MMHG | WEIGHT: 151 LBS

## 2024-03-27 DIAGNOSIS — R29.898 TRANSIENT RIGHT LEG WEAKNESS: ICD-10-CM

## 2024-03-27 DIAGNOSIS — Z79.4 TYPE 2 DIABETES MELLITUS WITHOUT COMPLICATION, WITH LONG-TERM CURRENT USE OF INSULIN (HCC): ICD-10-CM

## 2024-03-27 DIAGNOSIS — E11.40 TYPE 2 DIABETES MELLITUS WITH DIABETIC NEUROPATHY, WITHOUT LONG-TERM CURRENT USE OF INSULIN (HCC): ICD-10-CM

## 2024-03-27 DIAGNOSIS — E78.2 MIXED HYPERLIPIDEMIA: ICD-10-CM

## 2024-03-27 DIAGNOSIS — Z76.0 MEDICATION REFILL: ICD-10-CM

## 2024-03-27 DIAGNOSIS — E11.9 TYPE 2 DIABETES MELLITUS WITHOUT COMPLICATION, WITH LONG-TERM CURRENT USE OF INSULIN (HCC): ICD-10-CM

## 2024-03-27 DIAGNOSIS — E11.622 DIABETIC ULCER OF LEFT LOWER LEG (HCC): ICD-10-CM

## 2024-03-27 DIAGNOSIS — I10 BENIGN ESSENTIAL HTN: ICD-10-CM

## 2024-03-27 DIAGNOSIS — L97.929 DIABETIC ULCER OF LEFT LOWER LEG (HCC): ICD-10-CM

## 2024-03-27 DIAGNOSIS — E78.5 DYSLIPIDEMIA: ICD-10-CM

## 2024-03-27 RX ORDER — AMOXICILLIN AND CLAVULANATE POTASSIUM 875; 125 MG/1; MG/1
1 TABLET, FILM COATED ORAL 2 TIMES DAILY
Qty: 14 TABLET | Refills: 0 | Status: SHIPPED | OUTPATIENT
Start: 2024-03-27

## 2024-03-27 RX ORDER — LISINOPRIL 40 MG/1
40 TABLET ORAL DAILY
Qty: 100 TABLET | Refills: 3 | Status: SHIPPED | OUTPATIENT
Start: 2024-03-27

## 2024-03-27 RX ORDER — ATENOLOL 50 MG/1
50 TABLET ORAL DAILY
Qty: 100 TABLET | Refills: 3 | Status: SHIPPED | OUTPATIENT
Start: 2024-03-27

## 2024-03-27 RX ORDER — DOXYCYCLINE HYCLATE 100 MG
100 TABLET ORAL 2 TIMES DAILY
Qty: 14 TABLET | Refills: 0 | Status: SHIPPED | OUTPATIENT
Start: 2024-03-27

## 2024-03-27 RX ORDER — PRAVASTATIN SODIUM 10 MG
10 TABLET ORAL DAILY
Qty: 100 TABLET | Refills: 3 | Status: SHIPPED | OUTPATIENT
Start: 2024-03-27

## 2024-03-27 RX ORDER — AMLODIPINE BESYLATE 10 MG/1
10 TABLET ORAL DAILY
Qty: 100 TABLET | Refills: 3 | Status: SHIPPED | OUTPATIENT
Start: 2024-03-27

## 2024-03-27 RX ORDER — EMPAGLIFLOZIN 25 MG/1
25 TABLET, FILM COATED ORAL DAILY
Qty: 100 TABLET | Refills: 3 | Status: SHIPPED | OUTPATIENT
Start: 2024-03-27

## 2024-03-27 ASSESSMENT — ENCOUNTER SYMPTOMS
WEIGHT LOSS: 0
WEAKNESS: 1
CHILLS: 0
FEVER: 0
SHORTNESS OF BREATH: 0
PALPITATIONS: 0

## 2024-03-27 NOTE — PROGRESS NOTES
Verbal consent was acquired by the patient to use [email or web address]. ambient listening note generation during this visit     CC:  Chief Complaint   Patient presents with    Medication Refill       Zenaida SCOTT Blanca 78 y.o. female  patient presenting for     History of Present Illness  The patient is a 78-year-old female coming in today to do refill medications, but also have a complaint of chronic right leg discomfort.    Right leg weakness   About a month ago, her dog laid on her leg, and she got up and could not put weight on it. Now, it is recovering again, but it takes time, and it is very frustrating for her. It is not painful. She feels her strength returning in the thigh muscles and the lower muscles.  She has been to physical therapy on and off. They did an electronic nerve stimulation in the back last time, but she could not put any weight at all. She came home and ended up in urgent care because she could not bear weight at all. She has not worked in about 2 months now. She thinks it is related to her back. She has an appointment today for a thoracic MRI.    DMT2  She is taking metformin 1000 mg twice a day. She is not taking glimepiride. She needs a refill on the metformin. She started taking insulin 11 units every day about 1 to 2 months ago. She has an appointment with the pharmacist in about 3 weeks. She has not restarted Rybelsus. She is taking Jardiance 25 mg and glargine insulin 11 units every morning.    DM Ulcers   3 ulcers to her right lower extremity and 1 ulcer to her left lower extremity. Erythema noted. She is not seeing wound care. She puts bandages on them and takes bandages off. There is no pain. She last saw wound care about 2 weeks ago. She has not taken any antibiotics recently.          Review of Systems   Constitutional:  Negative for chills, fever, malaise/fatigue and weight loss.   Respiratory:  Negative for shortness of breath.    Cardiovascular:  Negative for chest pain and  "palpitations.   Skin:         Ulcers to bilateral lower extremities   Neurological:  Positive for weakness.       /60 (BP Location: Left arm, Patient Position: Sitting, BP Cuff Size: Adult)   Pulse 60   Temp 36.2 °C (97.1 °F) (Temporal)   Resp 16   Ht 1.575 m (5' 2\")   Wt 68.5 kg (151 lb)   SpO2 97% , Body mass index is 27.62 kg/m².    Physical Exam  Constitutional:       Appearance: Normal appearance.   HENT:      Head: Normocephalic and atraumatic.   Skin:     General: Skin is warm and dry.      Findings: Erythema and wound present.          Neurological:      Mental Status: She is alert.           Results        Assessment & Plan  1. Chronic right leg discomfort.  Chronic and stable condition  Notes improvement       2. Diabetes.  Chronic and stable condition   We will follow up on rybelsus from [facility]. I will reach out to the pharmacist today and find out where the Rybelsus is. A prescription for metformin 1000 mg 1 tablet twice a day was sent.    3. Hypertension.  Chronic and stable condition   Refills for amlodipine, lisinopril, and atenolol were sent.    4. Hyperlipidemia.  Chronic and stable condition   Refills were provided for pravastatin.    5. Right leg ulcers.  Chronic and exacerbated condition  condition   We discussed that it is an open area that can go into an infection.   A prescription for Augmentin and doxycycline was sent.    Follow-up  Refills were provided for metformin, atenolol, lisinopril, amlodipine, and pravastatin.       1. Medication refill  atenolol (TENORMIN) 50 MG Tab    lisinopril (PRINIVIL) 40 MG tablet    amLODIPine (NORVASC) 10 MG Tab    pravastatin (PRAVACHOL) 10 MG Tab      2. Type 2 diabetes mellitus without complication, with long-term current use of insulin (HCC)        3. Benign essential HTN  atenolol (TENORMIN) 50 MG Tab    lisinopril (PRINIVIL) 40 MG tablet    amLODIPine (NORVASC) 10 MG Tab      4. Mixed hyperlipidemia  amLODIPine (NORVASC) 10 MG Tab    " pravastatin (PRAVACHOL) 10 MG Tab      5. Dyslipidemia  pravastatin (PRAVACHOL) 10 MG Tab      6. Type 2 diabetes mellitus with diabetic neuropathy, without long-term current use of insulin (HCC)  Empagliflozin (JARDIANCE) 25 MG Tab    metformin (GLUCOPHAGE) 1000 MG tablet      7. Transient right leg weakness        8. Diabetic ulcer of left lower leg (HCC)  Referral to Wound Clinic    amoxicillin-clavulanate (AUGMENTIN) 875-125 MG Tab    doxycycline (VIBRAMYCIN) 100 MG Tab          Discussed with patient possible alternative diagnoses, patient is to take all medications as prescribed.     If symptoms persist FU w/PCP, if symptoms worsen go to emergency room.     If experiencing any side effects from prescribed medications reports to the office immediately or go to emergency room.    Reviewed indication, dosage, usage and potential adverse effects of prescribed medications.     Reviewed risks and benefits of treatment plan. Patient verbalizes understanding of all instruction and verbally agrees to plan.    Return in about 2 weeks (around 4/10/2024) for follow up DM ulcers.    This note was created using voice recognition software ([employer]). The accuracy of the dictation is limited by the abilities of the software. I have reviewed the note prior to signing, however some errors in grammar and context are still possible. If you have any questions related to this note please do not hesitate to contact our office.

## 2024-04-10 ENCOUNTER — OFFICE VISIT (OUTPATIENT)
Dept: MEDICAL GROUP | Facility: PHYSICIAN GROUP | Age: 79
End: 2024-04-10
Payer: MEDICARE

## 2024-04-10 VITALS
BODY MASS INDEX: 28.01 KG/M2 | WEIGHT: 152.2 LBS | OXYGEN SATURATION: 99 % | HEART RATE: 68 BPM | HEIGHT: 62 IN | DIASTOLIC BLOOD PRESSURE: 74 MMHG | RESPIRATION RATE: 14 BRPM | SYSTOLIC BLOOD PRESSURE: 142 MMHG | TEMPERATURE: 97.3 F

## 2024-04-10 DIAGNOSIS — I83.019 VENOUS ULCERS OF BOTH LOWER EXTREMITIES (HCC): ICD-10-CM

## 2024-04-10 DIAGNOSIS — L97.919 VENOUS ULCERS OF BOTH LOWER EXTREMITIES (HCC): ICD-10-CM

## 2024-04-10 DIAGNOSIS — I83.029 VENOUS ULCERS OF BOTH LOWER EXTREMITIES (HCC): ICD-10-CM

## 2024-04-10 DIAGNOSIS — E11.40 TYPE 2 DIABETES MELLITUS WITH DIABETIC NEUROPATHY, WITHOUT LONG-TERM CURRENT USE OF INSULIN (HCC): ICD-10-CM

## 2024-04-10 DIAGNOSIS — L97.929 VENOUS ULCERS OF BOTH LOWER EXTREMITIES (HCC): ICD-10-CM

## 2024-04-10 PROCEDURE — 3078F DIAST BP <80 MM HG: CPT | Performed by: NURSE PRACTITIONER

## 2024-04-10 PROCEDURE — 3077F SYST BP >= 140 MM HG: CPT | Performed by: NURSE PRACTITIONER

## 2024-04-10 PROCEDURE — 99214 OFFICE O/P EST MOD 30 MIN: CPT | Performed by: NURSE PRACTITIONER

## 2024-04-10 RX ORDER — LISINOPRIL 10 MG/1
TABLET ORAL DAILY
COMMUNITY

## 2024-04-10 RX ORDER — ORAL SEMAGLUTIDE 14 MG/1
1 TABLET ORAL DAILY
Qty: 30 TABLET | Refills: 0 | Status: SHIPPED | OUTPATIENT
Start: 2024-04-10

## 2024-04-10 ASSESSMENT — ENCOUNTER SYMPTOMS
SHORTNESS OF BREATH: 0
FEVER: 0
WEIGHT LOSS: 0
CHILLS: 0

## 2024-04-10 NOTE — PROGRESS NOTES
Verbal consent was acquired by the patient to use [employer] ambient listening note generation during this visit     CC:  Chief Complaint   Patient presents with    Follow-Up     All medication FV, leg problems        Zenaida SCOTT Blanca 78 y.o. female  patient presenting for     History of Present Illness  The patient is a 78-year-old female who is coming in today to follow up on some medications and to follow up on her leg.    DMT2  The patient has not yet been contacted by anyone regarding the initiation of Rybelsus for her diabetes, as she still lacks refills. She has a pharmacy appointment scheduled for the 17th. She is uncertain about the feasibility of affording Rybelsus for an additional month. She has been managing her diabetes with insulin, with the exception of recent months, which she reports as beneficial. She has not experienced any adverse effects from this treatment. She monitors her blood glucose levels daily, maintaining a log, which typically range from 100 to 130 in the mornings.    Venous ulcers  Last visit patient was started on Augmentin and doxycycline which she finished. She notes no worsening on ulcers to bilateral legs. New pictures taken and in media. She has not been able to follow up with wound care as no one has contacted her. While in office phone call placed to wound care with pt in room and she is scheduled for wound care visit 4/18/2024 at 0800 which she agrees too.  Several weeks ago, she underwent a biopsy at [facility] but states she missed an appointment and they never got back to her to schedule a follow up.       Review of Systems   Constitutional:  Negative for chills, fever, malaise/fatigue and weight loss.   Respiratory:  Negative for shortness of breath.    Cardiovascular:  Negative for leg swelling.   Skin:         Multiple skin ulcers to left and right lower extremities       BP (!) 142/74   Pulse 68   Temp 36.3 °C (97.3 °F) (Temporal)   Resp 14    "Ht 1.575 m (5' 2\")   Wt 69 kg (152 lb 3.2 oz)   SpO2 99% , Body mass index is 27.84 kg/m².    Physical Exam  Constitutional:       Appearance: Normal appearance.   HENT:      Head: Normocephalic and atraumatic.   Skin:     Findings: Erythema and wound present.             Comments: Pictures in chart under media   Neurological:      Mental Status: She is alert.           Results      Assessment and Plan    Assessment & Plan  1. Type 2 diabetes mellitus with diabetic neuropathy, without long-term current use of insulin ([facility])  Chronic and stable condition   Spoke with pharmacist Bob- notes they are still waiting for rybelsus prescription to be sent to their main pharmacy   - Semaglutide (RYBELSUS) 14 MG Tab; Take 1 Tablet by mouth every day.  Dispense: 30 Tablet; Refill: 0  - silver sulfADIAZINE (SILVADENE) 1 % Cream; Apply to venous ulcers twice daily and cover with steril bandage  Dispense: 50 g; Refill: 0    2. Venous ulcers of both lower extremities (HCC)  Chronic and exacerbated condition   Appointment made for pt 4/18/2024 at 0800  Provided pt phone number for wound clinic   Use silvadene until seen by clinic  - silver sulfADIAZINE (SILVADENE) 1 % Cream; Apply to venous ulcers twice daily and cover with steril bandage  Dispense: 50 g; Refill: 0            Discussed with patient possible alternative diagnoses, patient is to take all medications as prescribed.     If symptoms persist FU w/PCP, if symptoms worsen go to emergency room.     If experiencing any side effects from prescribed medications reports to the office immediately or go to emergency room.    Reviewed indication, dosage, usage and potential adverse effects of prescribed medications.     Reviewed risks and benefits of treatment plan. Patient verbalizes understanding of all instruction and verbally agrees to plan.    Return in about 2 weeks (around 4/24/2024) for follow up ulcers and wound care.    This note was created using voice recognition " software ([employer]). The accuracy of the dictation is limited by the abilities of the software. I have reviewed the note prior to signing, however some errors in grammar and context are still possible. If you have any questions related to this note please do not hesitate to contact our office.

## 2024-04-17 ENCOUNTER — OFFICE VISIT (OUTPATIENT)
Dept: MEDICAL GROUP | Facility: PHYSICIAN GROUP | Age: 79
End: 2024-04-17
Payer: MEDICARE

## 2024-04-17 DIAGNOSIS — E11.40 TYPE 2 DIABETES MELLITUS WITH DIABETIC NEUROPATHY, WITHOUT LONG-TERM CURRENT USE OF INSULIN (HCC): ICD-10-CM

## 2024-04-17 PROCEDURE — 99211 OFF/OP EST MAY X REQ PHY/QHP: CPT | Performed by: NURSE PRACTITIONER

## 2024-04-17 NOTE — PROGRESS NOTES
"Patient Consult Note    TIME IN: 10:55 am  TIME OUT: 11:17 am    Primary care physician: KIERA Mcclain    Reason for consult: Management of Uncontrolled Type 2 Diabetes    HPI:  Zenaida Rios is a 78 y.o. old patient who comes in today for evaluation of above stated problem.    Allergies  Hydromorphone, Morphine, Oxycodone-acetaminophen, Percocet [oxycodone-acetaminophen], Demerol, and Statins [hmg-coa-r inhibitors]    Current Diabetes Medication Regimen  Metformin IR: 1000 mg BID  SGLT-2 Inhibitor: empagliflozin (Jardiance) 25 mg daily  GLP-1RA: Rybelsus 14mg daily     Basal Insulin: glargine 11 units QPM    Previous Diabetes Medications and Reason for Discontinuation  Glimeperide - stopped  Pioglitazone - pt states \"horrible reaction\" many years ago that she cannot recall   Rybelsus - lethargy, fatigue, and abd pain. However, can tolerate 7 mg    Potential Barriers to Care:  Adherence: reports missed doses  Has been out of Rybelsus for ~ 2 months now  Side effects: none  Affordability:   Awaiting [employer] PAP status - pt picked up Jardiance $176/30ds  Rachel approved Rybelsus PAP 2/28/24. Shipment currently at [facility] in [place].  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 Still c/o RLE pain preventing her from exercise. Has wound on her RLE that is limiting to physical activity. Has been able to bear more weight.   Exercise Goal - Increase as tolerated    Dietary - unchanged " "since last visit  Breakfast - None typically  Lunch - Cheese sticks, sandwich (PB), \"whatever is in the refigerator\"  Dinner - Cooks meals at home. Typically meat, potato, lots of vegetables. Occasionally skips.  Snacks - Cheese, crackers  Desserts - Avoids sweets mostly  Drinks - Coffee w/ creamer, water    Labs  Lab Results   Component Value Date/Time    HBA1C 9.4 (A) 03/06/2024 11:28 AM      Lab Results   Component Value Date/Time    SODIUM 139 07/15/2021 07:40 AM    POTASSIUM 4.5 07/15/2021 07:40 AM    CHLORIDE 103 07/15/2021 07:40 AM    CO2 22 07/15/2021 07:40 AM    GLUCOSE 131 (H) 07/15/2021 07:40 AM    BUN 16 07/15/2021 07:40 AM    CREATININE 0.91 07/15/2021 07:40 AM     Lab Results   Component Value Date/Time    ALKPHOSPHAT 75 07/15/2021 07:40 AM    ASTSGOT 27 07/15/2021 07:40 AM    ALTSGPT 24 07/15/2021 07:40 AM    TBILIRUBIN 0.3 07/15/2021 07:40 AM    ALBUMIN 4.6 07/15/2021 07:40 AM        Physical Examination:  Vital signs: There were no vitals taken for this visit. There is no height or weight on file to calculate BMI.    Assessment and Plan:    1. DM2  Discussed Goals: FBG 80 - 130, 2hPP < 180, a1c < 7.5% given age & difficulty to achieve glycemic control d/t cost of medications  Last a1c drawn on 3/6/24 was 9.4%, which is not at goal  Pt reported SMBG are sporadic per pt. FBG seems to be at goal.  Pt approved for Rybelsus PAP. Shipment currently at [facility]. PharmD to bring to [facility] next week - pt will p/u when she sees PCP.  No updates on [employer] PAP - messaged [facility] pharm rep to inquire.  Will decrease basal insulin dose upon re-initiation of Rybelsus. Counseled pt to ensure she administer correctly and also regarding SE mitigation techniques.    - Medication changes:  Restart Rybelsus 14mg daily upon retrieval from PAP   Reduce glargine to 7 units QPM  - Continue:  Jardiance 25mg daily    - Lifestyle changes:  Continue to increase physical activity as tolerated    - " Preventative management:  REC DM Score: 0  Care gaps addressed: n/a  Care gaps updated in Health Maintenance    Follow Up:  3 weeks (2 weeks after resuming Rybelsus)    Manuel Owens, PharmD    CC:   KIERA Mcclain

## 2024-04-17 NOTE — PATIENT INSTRUCTIONS
Once you receive Rybelsus, start taking w/ 1/2 glass of water every morning 30 min prior to any other food/drink/medication. Do not over eat while on this medicine - cut down on portion sizes.    Once you start Rybelsus, cut down on your insulin. Decrease to 7 units once daily

## 2024-04-17 NOTE — Clinical Note
Hello!  Can you reach out to [employer] to inquire on status of PAP? Pt reports she has not heard back regarding dayami status. Let me know if you need anything! Manuel

## 2024-04-19 DIAGNOSIS — E11.9 TYPE 2 DIABETES MELLITUS WITHOUT COMPLICATION, WITHOUT LONG-TERM CURRENT USE OF INSULIN (HCC): ICD-10-CM

## 2024-04-19 PROCEDURE — RXMED WILLOW AMBULATORY MEDICATION CHARGE: Performed by: NURSE PRACTITIONER

## 2024-04-19 NOTE — TELEPHONE ENCOUNTER
Received request via: Pharmacy    Was the patient seen in the last year in this department? Yes    Does the patient have an active prescription (recently filled or refills available) for medication(s) requested? No    Pharmacy Name: [facility]     Does the patient have custodial Plus and need 100 day supply (blood pressure, diabetes and cholesterol meds only)? Yes, quantity updated to 100 days

## 2024-04-22 PROCEDURE — RXMED WILLOW AMBULATORY MEDICATION CHARGE: Performed by: NURSE PRACTITIONER

## 2024-04-24 ENCOUNTER — PATIENT OUTREACH (OUTPATIENT)
Dept: HEALTH INFORMATION MANAGEMENT | Facility: OTHER | Age: 79
End: 2024-04-24

## 2024-04-24 ENCOUNTER — OFFICE VISIT (OUTPATIENT)
Dept: MEDICAL GROUP | Facility: PHYSICIAN GROUP | Age: 79
End: 2024-04-24
Payer: MEDICARE

## 2024-04-24 VITALS
BODY MASS INDEX: 28.05 KG/M2 | SYSTOLIC BLOOD PRESSURE: 122 MMHG | RESPIRATION RATE: 16 BRPM | DIASTOLIC BLOOD PRESSURE: 62 MMHG | WEIGHT: 152.4 LBS | TEMPERATURE: 96.7 F | HEIGHT: 62 IN | HEART RATE: 77 BPM | OXYGEN SATURATION: 97 %

## 2024-04-24 DIAGNOSIS — E11.40 TYPE 2 DIABETES MELLITUS WITH DIABETIC NEUROPATHY, WITHOUT LONG-TERM CURRENT USE OF INSULIN (HCC): ICD-10-CM

## 2024-04-24 DIAGNOSIS — I10 BENIGN ESSENTIAL HTN: ICD-10-CM

## 2024-04-24 DIAGNOSIS — L97.909 NON-PRESSURE CHRONIC ULCER OF LOWER LEG, UNSPECIFIED LATERALITY, UNSPECIFIED ULCER STAGE (HCC): ICD-10-CM

## 2024-04-24 PROCEDURE — 3074F SYST BP LT 130 MM HG: CPT | Performed by: NURSE PRACTITIONER

## 2024-04-24 PROCEDURE — 3078F DIAST BP <80 MM HG: CPT | Performed by: NURSE PRACTITIONER

## 2024-04-24 PROCEDURE — 99214 OFFICE O/P EST MOD 30 MIN: CPT | Performed by: NURSE PRACTITIONER

## 2024-04-24 RX ORDER — GLIMEPIRIDE 4 MG/1
4 TABLET ORAL EVERY MORNING
Qty: 100 TABLET | Refills: 0 | Status: SHIPPED | OUTPATIENT
Start: 2024-04-24

## 2024-04-24 ASSESSMENT — ENCOUNTER SYMPTOMS
VOMITING: 0
HEADACHES: 0
POLYDIPSIA: 0
CONSTIPATION: 0
FEVER: 0
NAUSEA: 0
ABDOMINAL PAIN: 0
WEIGHT LOSS: 0
CHILLS: 0
DIZZINESS: 0
DIARRHEA: 0
SHORTNESS OF BREATH: 0

## 2024-04-24 NOTE — PROGRESS NOTES
Verbal consent was acquired by the patient to use [employer] ambient listening note generation during this visit     CC:  Chief Complaint   Patient presents with    Follow-Up     2 week FV ulcers and wound care, pt says wounds have been healing        Zenaida ManciaLeesaDenorberto 78 y.o. female  patient presenting for     History of Present Illness  The patient is a 78-year-old female presenting today for a follow-up consultation regarding her leg ulcers. During her previous visit, she was prescribed Silvadene cream and arranged new referrals and appointments with a wound care specialist. However, she was unable to attend her scheduled wound care appointment due to a potential cancellation.    Non pressure ulcerative wounds to bilateral lower legs  The patient's next appointment with wound care is scheduled for the following Wednesday at 1:30 PM. She does not apply bandages to wounds and like them to air out.  States she is cleaning them  with soap and water. She expresses satisfaction with the application of Silvadene cream, noting a noticeable enhancement in the healing process. She denies experiencing any picking at the ulcers.    DMT2  The patient's diabetes remains stable. She has been adhering to her prescribed medication regimen, which includes daily insulin administration. She has reduced her insulin dosage as per Murphy's instructions to 12 units and then once on the Rybelsus will decrease again to 7 units. She monitors her blood glucose levels at home, which typically range from 120 to 135 in the mornings. She has not monitored her blood glucose levels during the day. She has been consistently taking Jardiance.  She has a scheduled appointment with  pharmacotherapy on 05/08/2024.       Review of Systems   Constitutional:  Negative for chills, fever, malaise/fatigue and weight loss.   Respiratory:  Negative for shortness of breath.    Cardiovascular:  Negative for chest pain.   Gastrointestinal:  Negative for  "abdominal pain, constipation, diarrhea, nausea and vomiting.   Skin:         Bilateral lower leg ulcers   Neurological:  Negative for dizziness and headaches.   Endo/Heme/Allergies:  Negative for polydipsia.       /62   Pulse 77   Temp 35.9 °C (96.7 °F) (Temporal)   Resp 16   Ht 1.575 m (5' 2\")   Wt 69.1 kg (152 lb 6.4 oz)   SpO2 97% , Body mass index is 27.87 kg/m².    Physical Exam  Constitutional:       General: She is not in acute distress.     Appearance: Normal appearance. She is not ill-appearing.   HENT:      Head: Normocephalic and atraumatic.   Pulmonary:      Effort: Pulmonary effort is normal.   Skin:     General: Skin is warm and dry.      Capillary Refill: Capillary refill takes less than 2 seconds.      Findings: Erythema and lesion present.          Neurological:      Mental Status: She is alert and oriented to person, place, and time.   Psychiatric:         Mood and Affect: Mood normal.         Behavior: Behavior normal.         Thought Content: Thought content normal.         Judgment: Judgment normal.           Results      Assessment and Plan    Assessment & Plan  1.1. Type 2 diabetes mellitus with diabetic neuropathy, without long-term current use of insulin (HCC)  Chronic and stable condition   Provided pt with rybelsus tablets from pharmacotherapist at visit- total of 4 boxes at 30 count provided to pt  he patient's diabetes is currently well-managed. The patient will commence a regimen of Rybelsus, to be taken once daily with a half glass of water, 30 minutes prior to any food, drink, or medication. She has been advised against overeating and to reduce portion sizes while on this medication. Upon commencement of Rybelsus, her insulin dosage will be reduced to 7. She will maintain her current regimen of glargine, with a reduction in insulin glargine dosage to 7 units. She will also continue with her current medications.    2. Non-pressure chronic ulcer of lower leg, unspecified " laterality, unspecified ulcer stage (HCC)  Chronic and stable condition    Discussed at length patient needs to clean wounds and place bandages over sites to help reduce chance of infection  The patient is scheduled for a follow-up appointment with [facility] next Wednesday at 1:30 PM.           Discussed with patient possible alternative diagnoses, patient is to take all medications as prescribed.     If symptoms persist FU w/PCP, if symptoms worsen go to emergency room.     If experiencing any side effects from prescribed medications reports to the office immediately or go to emergency room.    Reviewed indication, dosage, usage and potential adverse effects of prescribed medications.     Reviewed risks and benefits of treatment plan. Patient verbalizes understanding of all instruction and verbally agrees to plan.    Return in about 7 weeks (around 6/12/2024) for follow up DM- A1C.    This note was created using voice recognition software ([employer]). The accuracy of the dictation is limited by the abilities of the software. I have reviewed the note prior to signing, however some errors in grammar and context are still possible. If you have any questions related to this note please do not hesitate to contact our office.

## 2024-04-26 ENCOUNTER — DOCUMENTATION (OUTPATIENT)
Dept: MEDICAL GROUP | Facility: PHYSICIAN GROUP | Age: 79
End: 2024-04-26
Payer: MEDICARE

## 2024-04-26 NOTE — PROGRESS NOTES
Refill request from [facility] for Rybelsus completed, faxed back, and scanned to media.  Bob Vo, PharmD, BCACP

## 2024-05-08 ENCOUNTER — OFFICE VISIT (OUTPATIENT)
Dept: MEDICAL GROUP | Facility: PHYSICIAN GROUP | Age: 79
End: 2024-05-08
Payer: MEDICARE

## 2024-05-08 DIAGNOSIS — E11.40 TYPE 2 DIABETES MELLITUS WITH DIABETIC NEUROPATHY, WITHOUT LONG-TERM CURRENT USE OF INSULIN (HCC): ICD-10-CM

## 2024-05-08 PROCEDURE — 99211 OFF/OP EST MAY X REQ PHY/QHP: CPT | Performed by: STUDENT IN AN ORGANIZED HEALTH CARE EDUCATION/TRAINING PROGRAM

## 2024-05-08 NOTE — PROGRESS NOTES
"Patient Consult Note - Follow Up Visit  Primary care physician: KIERA Mcclain    Reason for consult: Management of Uncontrolled Type 2 Diabetes    Time IN:  1:31pm  Time OUT:  1:50pm    HPI:  Zenaida Rios is a 78 y.o. old patient who comes in today for evaluation of above stated problem.    Most Recent HbA1c:   Lab Results   Component Value Date/Time    HBA1C 9.4 (A) 03/06/2024 11:28 AM        Reliable and Exact Care Diabetes Score    Displays the Diabetes REC Score.  A patient gets 1 point for each measure for a maximum of 7 points   0  Measures Not Met              Current Diabetes Medication Regimen  Metformin IR: 1000 mg BID  SGLT-2 Inhibitor: empagliflozin (Jardiance) 25 mg daily  GLP-1RA: Rybelsus 14mg daily - restarted on 4-29-24     Basal Insulin: glargine 4 to 7 units QPM - decreased after last visit.     Previous Diabetes Medications and Reason for Discontinuation  Glimeperide - stopped  Pioglitazone - pt states \"horrible reaction\" many years ago that she cannot recall   Rybelsus - lethargy, fatigue, and abd pain. However, can tolerate 7 mg    Discussed FBG goal of , 2hrPP <180, and A1c <7.0%.  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 Still c/o RLE pain preventing her from exercise. Has wound on her RLE that is limiting to physical activity. Has been able to bear more weight.   Exercise Goal - Increase as tolerated     Dietary - unchanged since last visit  Breakfast - None typically  Lunch - Cheese sticks, sandwich (PB), \"whatever is in the refigerator\"  Dinner - Cooks meals at home. Typically meat, potato, lots of vegetables. Occasionally skips.  Snacks - Cheese, crackers  Desserts - Avoids sweets " mostly  Drinks - Coffee w/ creamer, water    Preventative Management  BP regimen (ACEi/ARB): Lisinopril 40mg QD  BP <140/90: Yes  Statin: pravastatin (Pravachol) 10 mg daily  LDL <100: No  Lab Results   Component Value Date/Time    CHOLSTRLTOT 179 07/15/2021 07:40 AM     (H) 07/15/2021 07:40 AM    HDL 43 07/15/2021 07:40 AM    TRIGLYCERIDE 153 (H) 07/15/2021 07:40 AM       Last Microalbumin/Cr:  Lab Results   Component Value Date/Time    MALBCRT 82 (H) 07/20/2021 08:15 AM    MICROALBUR 5.4 07/20/2021 08:15 AM     Last A1c:  Lab Results   Component Value Date/Time    HBA1C 9.4 (A) 03/06/2024 11:28 AM      Last Retinal Scan: up to date, 9/0223    Monofilament exam: up to date, 8/2023       Updated caregaps      ROS:  Constitutional: No weight loss  Cardiac: No palpitations or racing heart  Resp: No shortness of breath  Neuro: No numbness or tinging in feet  Endo: No heat or cold intolerance, no polyuria or polydipsia  All other systems were reviewed and were negative.    Past Medical History:  Patient Active Problem List    Diagnosis Date Noted    Transient right leg weakness 03/27/2024    Neuroforaminal stenosis of thoracic spine 12/28/2023    Wound healing, delayed 11/29/2023    Poor dentition 11/29/2023    ELICEO (acute kidney injury) (HCC) 10/24/2023    Wound cellulitis 10/24/2023    History of lumbar fusion 08/15/2023    On economic assistance program 05/24/2023    Dyslipidemia 11/19/2019    Type 2 diabetes mellitus with diabetic neuropathy, without long-term current use of insulin (HCC)     Neuropathy 01/17/2019    Vitamin D deficiency 01/17/2019    Obesity (BMI 30-39.9) 07/17/2018    Benign essential HTN 05/21/2018       Past Surgical History:  Past Surgical History:   Procedure Laterality Date    FUSION, SPINE, LUMBAR, PLIF  1/11/2010    Performed by LYNNETTE SMILEY at SURGERY [facility]    LAMINOTOMY  1/11/2010    Performed by LYNNETTE SMILEY at SURGERY [facility]    LUMBAR LAMINECTOMY  DISKECTOMY  8/31/2009    OTHER ORTHOPEDIC SURGERY  1989    tibia has a tanna knee to ankle, fibula is resected    PRIMARY C SECTION  1988    OTHER  1975    left lumpectomt in breast - benign    OTHER         Allergies:  Hydromorphone, Morphine, Oxycodone-acetaminophen, Percocet [oxycodone-acetaminophen], Demerol, and Statins [hmg-coa-r inhibitors]    Social History:  Social History     Socioeconomic History    Marital status:      Spouse name: Not on file    Number of children: Not on file    Years of education: Not on file    Highest education level: Not on file   Occupational History    Not on file   Tobacco Use    Smoking status: Never    Smokeless tobacco: Never   Vaping Use    Vaping Use: Never used   Substance and Sexual Activity    Alcohol use: No    Drug use: No    Sexual activity: Yes     Partners: Male     Comment:    Other Topics Concern    Not on file   Social History Narrative    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      Social Determinants of Health     Financial Resource Strain: Not on file   Food Insecurity: Not on file   Transportation Needs: Not on file   Physical Activity: Not on file   Stress: Not on file   Social Connections: Not on file   Intimate Partner Violence: Not on file   Housing Stability: Not on file       Family History:  Family History   Adopted: Yes   Problem Relation Age of Onset    No Known Problems Mother     No Known Problems Father     No Known Problems Sister     No Known Problems Brother        Medications:    Current Outpatient Medications:     glimepiride (AMARYL) 4 MG Tab, Take 1 tablet by mouth every morning., Disp: 100 Tablet, Rfl: 0    lisinopril (PRINIVIL) 10 MG Tab, Take  by mouth every day. (Patient not taking: Reported on 4/10/2024), Disp: , Rfl:     metFORMIN (GLUCOPHAGE) 500 MG Tab, Take 1,000 mg by mouth 2 times a day. (Patient not taking: Reported on 4/24/2024), Disp: , Rfl:     Semaglutide (RYBELSUS) 14 MG Tab, Take 1 Tablet by mouth every day.,  Disp: 30 Tablet, Rfl: 0    silver sulfADIAZINE (SILVADENE) 1 % Cream, Apply to venous ulcers twice daily and cover with steril bandage, Disp: 50 g, Rfl: 0    atenolol (TENORMIN) 50 MG Tab, Take 1 tablet by mouth every day., Disp: 100 Tablet, Rfl: 3    lisinopril (PRINIVIL) 40 MG tablet, Take 1 Tablet by mouth every day., Disp: 100 Tablet, Rfl: 3    amLODIPine (NORVASC) 10 MG Tab, Take 1 Tablet by mouth every day., Disp: 100 Tablet, Rfl: 3    pravastatin (PRAVACHOL) 10 MG Tab, Take 1 tablet by mouth daily, Disp: 100 Tablet, Rfl: 3    Empagliflozin (JARDIANCE) 25 MG Tab, Take 1 tablet (25 mg) by mouth every day., Disp: 100 Tablet, Rfl: 3    metformin (GLUCOPHAGE) 1000 MG tablet, Take 1 Tablet by mouth 2 times a day with meals., Disp: 200 Tablet, Rfl: 3    amoxicillin-clavulanate (AUGMENTIN) 875-125 MG Tab, Take 1 Tablet by mouth 2 times a day. (Patient not taking: Reported on 4/10/2024), Disp: 14 Tablet, Rfl: 0    doxycycline (VIBRAMYCIN) 100 MG Tab, Take 1 Tablet by mouth 2 times a day. (Patient not taking: Reported on 4/10/2024), Disp: 14 Tablet, Rfl: 0    Insulin Glargine, 1 Unit Dial, (TOUJEO SOLOSTAR) 300 UNIT/ML Solution Pen-injector, Inject 10 Units under the skin every evening. (Patient taking differently: Inject 11 Units under the skin every evening.), Disp: 1.5 mL, Rfl: 1    Insulin Pen Needle 32 G x 4 mm, Use one pen needle in pen device to inject insulin once daily., Disp: 100 Each, Rfl: 1    Insulin Glargine-yfgn 100 UNIT/ML Solution Pen-injector, INJECT 10 UNITS UNDER THE SKIN DAILY OR AS DIRECTED (MAX 50 UNITS/DAY) (Patient taking differently: Inject 11 Units under the skin at bedtime. INJECT 11 UNITS UNDER THE SKIN DAILY OR AS DIRECTED (MAX 50 UNITS/DAY)), Disp: 20 mL, Rfl: 1    therapeutic multivitamin-minerals (THERAGRAN-M) Tab, Take 1 Tablet by mouth every day., Disp: , Rfl:     gabapentin (NEURONTIN) 100 MG Cap, Take 1 Capsule by mouth 3 times a day. (Patient taking differently: Take 10 mg by mouth  every day.), Disp: 90 Capsule, Rfl: 0    vitamin D (CHOLECALCIFEROL) 1000 UNIT Tab, Take 1,000 Units by mouth every day., Disp: , Rfl:     glucose blood strip, 1 Each by Other route as needed., Disp: , Rfl:     STOOL SOFTENER PO, Take 1 Tab by mouth every day., Disp: , Rfl:     Labs: Reviewed    Physical Examination:  Vital signs: There were no vitals taken for this visit. There is no height or weight on file to calculate BMI.  General: No apparent distress, cooperative  Eyes: No scleral icterus or discharge  ENMT: Normal on external inspection of nose, lips, normal thyroid exam  Neck: No abnormal masses on inspection  Resp: Normal effort, clear to auscultation bilaterally   CVS: Regular rate and rhythm, S1 S2 normal, no murmur   Extremities: No edema  Abdomen: abdominal obesity present  Neuro: Alert and oriented  Skin: No rash  Psych: Normal mood and affect, intact memory and able to make informed decisions    Assessment and Plan:    Basic physiology of DMII was explained to patient as well as microvascular/macrovascular complications. The importance of increasing physical activity to improve diabetes control was discussed with the patient. Patient was also educated on changing diet and making better choices to help control blood sugar.    Patient is optimized on Jardiance, metformin and Rybelsus.  Verified current insulin dosing as she has tapered dose since last visit.  Home FBG have increased considerably over the last several weeks.  A1c not yet due.  As above, no appreciable changes to nutrition and exercise other than a bit less activity with sore RLE.    Patient believes Rybelsus is having negative impact on blood glucose and wishes to discontinue therapy.  Explained that reason for high glucose readings is likely that insulin has decreased significantly, and Rybelsus only restarted ~one week or so ago.  Still, she declines to restart.    INCREASE Toujeo to 11 units every evening.  Will likely need further  dose titration, which patient understands.  STOP Rybelsus per patient request.  Continue all other medications for now.  Continue to work on optimizing nutrition habits.  Increase exercise.    Follow Up:  Three weeks.    Thank you for allowing me to participate in the care of this patient.    Bob Vo, PharmD, [facility]  05/08/24    CC:   KIERA Mcclain

## 2024-05-16 DIAGNOSIS — E11.40 TYPE 2 DIABETES MELLITUS WITH DIABETIC NEUROPATHY, WITHOUT LONG-TERM CURRENT USE OF INSULIN (HCC): ICD-10-CM

## 2024-05-16 DIAGNOSIS — Z76.0 MEDICATION REFILL: ICD-10-CM

## 2024-05-16 DIAGNOSIS — E78.2 MIXED HYPERLIPIDEMIA: ICD-10-CM

## 2024-05-16 DIAGNOSIS — I10 BENIGN ESSENTIAL HTN: ICD-10-CM

## 2024-05-16 DIAGNOSIS — E78.5 DYSLIPIDEMIA: ICD-10-CM

## 2024-05-16 NOTE — TELEPHONE ENCOUNTER
Received request via: Patient    Was the patient seen in the last year in this department? Yes    Does the patient have an active prescription (recently filled or refills available) for medication(s) requested? No    Pharmacy Name: Pharmacy:   [facility]     Does the patient have shelter Plus and need 100 day supply (blood pressure, diabetes and cholesterol meds only)? Yes, quantity updated to 100 days

## 2024-05-20 ENCOUNTER — PHARMACY VISIT (OUTPATIENT)
Dept: PHARMACY | Facility: MEDICAL CENTER | Age: 79
End: 2024-05-20
Payer: COMMERCIAL

## 2024-05-20 PROCEDURE — RXMED WILLOW AMBULATORY MEDICATION CHARGE: Performed by: NURSE PRACTITIONER

## 2024-05-20 RX ORDER — LISINOPRIL 40 MG/1
40 TABLET ORAL DAILY
Qty: 100 TABLET | Refills: 3 | Status: SHIPPED | OUTPATIENT
Start: 2024-05-20

## 2024-05-20 RX ORDER — PRAVASTATIN SODIUM 10 MG
10 TABLET ORAL DAILY
Qty: 100 TABLET | Refills: 3 | Status: SHIPPED | OUTPATIENT
Start: 2024-05-20

## 2024-05-29 ENCOUNTER — OFFICE VISIT (OUTPATIENT)
Dept: MEDICAL GROUP | Facility: PHYSICIAN GROUP | Age: 79
End: 2024-05-29
Payer: MEDICARE

## 2024-05-29 DIAGNOSIS — E11.40 TYPE 2 DIABETES MELLITUS WITH DIABETIC NEUROPATHY, WITHOUT LONG-TERM CURRENT USE OF INSULIN (HCC): ICD-10-CM

## 2024-05-29 NOTE — PROGRESS NOTES
"Patient Consult Note    TIME IN: 0821  TIME OUT: 0847    Primary care physician: KIERA Mcclain    Reason for consult: Management of Uncontrolled Type 2 Diabetes    HPI:  Zenaida Rios is a 78 y.o. old patient who comes in today for evaluation of above stated problem.    Allergies  Hydromorphone, Morphine, Oxycodone-acetaminophen, Percocet [oxycodone-acetaminophen], Demerol, and Statins [hmg-coa-r inhibitors]    Current Diabetes Medication Regimen  Metformin IR: 1000 mg BID  SGLT-2 Inhibitor: empagliflozin (Jardiance) 25 mg daily  BILLY: glimepiride 4mg once a few days ago      Basal Insulin: glargine sliding scale  4 units if 230  8 units if >230    Previous Diabetes Medications and Reason for Discontinuation  Glimeperide - stopped a long time ago  Pioglitazone - pt states \"horrible reaction\" many years ago that she cannot recall   Rybelsus   lethargy, fatigue, and abd pain initially  was able to take up to 14mg 4/29/24  stopped at 5/8/24 visit as pt's BG was 300-400 when she started    Potential Barriers to Care:  Adherence:   Taking insulin glargine as sliding scale, not 11 units qAM; as she dislikes and uncomfortable with insulin  Took glimepiride 1x which dropped her BG to 130  Side effects: none  Affordability: covered by insurance    [facility]  Pt has home glucometer and proper testing technique - yes  Discussed BG Goals: FBG 80 - 130, 2hPP < 180, A1c < 7.5% given age & difficulty to achieve glycemic control d/t cost of medications    Pt reports blood sugars:   Before Breakfast: mostly 150-200s, lowest was 130 when she took 1 dose of glimeperide    Hypoglycemia  Hypoglycemia awareness: Yes  Nocturnal hypoglycemia: None  Hypoglycemia:  None  Pt's treatment of Hypoglycemia  Discussed 15:15 Rule    Lifestyle  Current Exercise - Walking daily; limited d/t RLE pain  Exercise Goal - Increase as tolerated    Dietary - \"adequate diet\" tries to eat low calorie, low carb  Breakfast - None typically  Lunch " "- Cheese sticks, sandwich (PB), \"whatever is in the refrigerator\"  Dinner - Cooks meals at home. Typically meat, potato, lots of vegetables. Occasionally skips.  Snacks - Cheese  Desserts - Avoids sweets mostly  Drinks - Coffee w/ creamer 1-2x per day, water mostly    Labs  Lab Results   Component Value Date/Time    HBA1C 9.4 (A) 03/06/2024 11:28 AM      Lab Results   Component Value Date/Time    SODIUM 139 07/15/2021 07:40 AM    POTASSIUM 4.5 07/15/2021 07:40 AM    CHLORIDE 103 07/15/2021 07:40 AM    CO2 22 07/15/2021 07:40 AM    GLUCOSE 131 (H) 07/15/2021 07:40 AM    BUN 16 07/15/2021 07:40 AM    CREATININE 0.91 07/15/2021 07:40 AM     Lab Results   Component Value Date/Time    ALKPHOSPHAT 75 07/15/2021 07:40 AM    ASTSGOT 27 07/15/2021 07:40 AM    ALTSGPT 24 07/15/2021 07:40 AM    TBILIRUBIN 0.3 07/15/2021 07:40 AM    ALBUMIN 4.6 07/15/2021 07:40 AM        Physical Examination:  Vital signs: There were no vitals taken for this visit. There is no height or weight on file to calculate BMI.    Assessment and Plan:    1. DM2  Discussed Goals: FBG 80 - 130, 2hPP < 180, a1c < 7.5% given age & difficulty to achieve glycemic control d/t cost of medications  Last a1c drawn on 3/6/24 was 9.4%, which is not at goal  Pt states her BG has stayed below 300s since stopping Rybelsus 14mg. She will not retrial.  We discussed switching to Ozempic however pt is not amenable to this change at this time.  Pt reports using insulin glargine as a sliding scale instead of consistent dosing  She also took glimepiride 4mg once which dropped her BG from 200s to 130  We discussed why BILLY's have been unfavorable for pts given risk of hypoglycemia    - Medication changes:  Take insulin glargine 11 units qAM, not as a sliding scale  Stop glimepiride  - Continue:  Jardiance 25mg daily    - Lifestyle changes:  Continue to increase physical activity as tolerated  Increase fiber intake    - Preventative management:  REC DM Score: 0  Care gaps " addressed: n/a  Care gaps updated in Health Maintenance    Follow Up:  2 weeks for a1c    Denise Adan, PharmD    CC:   KIERA Mcclain

## 2024-06-12 ENCOUNTER — OFFICE VISIT (OUTPATIENT)
Dept: MEDICAL GROUP | Facility: PHYSICIAN GROUP | Age: 79
End: 2024-06-12
Payer: MEDICARE

## 2024-06-12 DIAGNOSIS — E11.40 TYPE 2 DIABETES MELLITUS WITH DIABETIC NEUROPATHY, WITHOUT LONG-TERM CURRENT USE OF INSULIN (HCC): ICD-10-CM

## 2024-06-12 LAB
HBA1C MFR BLD: 10.3 % (ref ?–5.8)
POCT INT CON NEG: NEGATIVE
POCT INT CON POS: POSITIVE

## 2024-06-12 PROCEDURE — 83036 HEMOGLOBIN GLYCOSYLATED A1C: CPT | Performed by: STUDENT IN AN ORGANIZED HEALTH CARE EDUCATION/TRAINING PROGRAM

## 2024-06-12 PROCEDURE — 99211 OFF/OP EST MAY X REQ PHY/QHP: CPT | Performed by: STUDENT IN AN ORGANIZED HEALTH CARE EDUCATION/TRAINING PROGRAM

## 2024-06-12 NOTE — PROGRESS NOTES
"Patient Consult Note - Follow Up Visit  Primary care physician: KIERA Mcclain    Reason for consult: Management of Uncontrolled Type 2 Diabetes    Time IN:  2:25pm  Time OUT:  2:57pm    HPI:  Zenaida Rios is a 78 y.o. old patient who comes in today for evaluation of above stated problem.    Most Recent HbA1c:   Lab Results   Component Value Date/Time    HBA1C 9.4 (A) 03/06/2024 11:28 AM      Reliable and Exact Care Diabetes Score    Displays the Diabetes REC Score.  A patient gets 1 point for each measure for a maximum of 7 points   0  Measures Not Met            Current Diabetes Medication Regimen  Metformin IR: 1000 mg BID  SGLT-2 Inhibitor: empagliflozin (Jardiance) 25 mg daily     Basal Insulin: glargine - continues to adjust on her own accord, mostly at 6 to 8 units at this time, dosing ~five hours after getting up each morning (11am)       Previous Diabetes Medications and Reason for Discontinuation  Glimeperide - stopped a long time ago  Pioglitazone - pt states \"horrible reaction\" many years ago that she cannot recall   Rybelsus   lethargy, fatigue, and abd pain initially  was able to take up to 14mg 4/29/24  stopped    Discussed FBG goal of , 2hrPP <180, and A1c <7.0%.  Before Breakfast: largely unchanged from last visit, 124-260, couple spikes several hours later to low 300s  Before Lunch:  Before Dinner:  Before Bedtime:  Other times:  Hypoglycemia:  None    Diet/Exercise:  Did not have sufficient times to discuss, focused majority of visit on insulin and appropriate dosing.    Per previous visit:  Current Exercise - Walking daily; limited d/t RLE pain  Exercise Goal - Increase as tolerated     Dietary - \"adequate diet\" tries to eat low calorie, low carb  Breakfast - None typically  Lunch - Cheese sticks, sandwich (PB), \"whatever is in the refrigerator\"  Dinner - Cooks meals at home. Typically meat, potato, lots of vegetables. Occasionally skips.  Snacks - Cheese  Desserts - " Avoids sweets mostly  Drinks - Coffee w/ creamer 1-2x per day, water mostly    Preventative Management  BP regimen (ACEi/ARB): Lisinopril 40mg QD  BP <140/90: Yes  Statin: pravastatin (Pravachol) 10 mg daily  LDL <100: No  Lab Results   Component Value Date/Time    CHOLSTRLTOT 179 07/15/2021 07:40 AM     (H) 07/15/2021 07:40 AM    HDL 43 07/15/2021 07:40 AM    TRIGLYCERIDE 153 (H) 07/15/2021 07:40 AM       Last Microalbumin/Cr:  Lab Results   Component Value Date/Time    MALBCRT 82 (H) 07/20/2021 08:15 AM    MICROALBUR 5.4 07/20/2021 08:15 AM     Last A1c:  Lab Results   Component Value Date/Time    HBA1C 9.4 (A) 03/06/2024 11:28 AM      Last Retinal Scan: up to date, 9/0223     Monofilament exam: up to date, 8/2023         Updated caregaps    ROS:  Constitutional: No weight loss  Cardiac: No palpitations or racing heart  Resp: No shortness of breath  Neuro: No numbness or tinging in feet  Endo: No heat or cold intolerance, no polyuria or polydipsia  All other systems were reviewed and were negative.    Past Medical History:  Patient Active Problem List    Diagnosis Date Noted    Transient right leg weakness 03/27/2024    Neuroforaminal stenosis of thoracic spine 12/28/2023    Wound healing, delayed 11/29/2023    Poor dentition 11/29/2023    ELICEO (acute kidney injury) (HCC) 10/24/2023    Wound cellulitis 10/24/2023    History of lumbar fusion 08/15/2023    On economic assistance program 05/24/2023    Dyslipidemia 11/19/2019    Type 2 diabetes mellitus with diabetic neuropathy, without long-term current use of insulin (HCC)     Neuropathy 01/17/2019    Vitamin D deficiency 01/17/2019    Obesity (BMI 30-39.9) 07/17/2018    Benign essential HTN 05/21/2018       Past Surgical History:  Past Surgical History:   Procedure Laterality Date    FUSION, SPINE, LUMBAR, PLIF  1/11/2010    Performed by LYNNETTE SMILEY at SURGERY [facility] ORS    LAMINOTOMY  1/11/2010    Performed by LYNNETTE SMILEY at SURGERY [facility]  ORS    LUMBAR LAMINECTOMY DISKECTOMY  8/31/2009    OTHER ORTHOPEDIC SURGERY  1989    tibia has a tanna knee to ankle, fibula is resected    PRIMARY C SECTION  1988    OTHER  1975    left lumpectomt in breast - benign    OTHER         Allergies:  Hydromorphone, Morphine, Oxycodone-acetaminophen, Percocet [oxycodone-acetaminophen], Demerol, Semaglutide, and Statins [hmg-coa-r inhibitors]    Social History:  Social History     Socioeconomic History    Marital status:      Spouse name: Not on file    Number of children: Not on file    Years of education: Not on file    Highest education level: Not on file   Occupational History    Not on file   Tobacco Use    Smoking status: Never    Smokeless tobacco: Never   Vaping Use    Vaping status: Never Used   Substance and Sexual Activity    Alcohol use: No    Drug use: No    Sexual activity: Yes     Partners: Male     Comment:    Other Topics Concern    Not on file   Social History Narrative    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      Social Determinants of Health     Financial Resource Strain: Not on file   Food Insecurity: Not on file   Transportation Needs: Not on file   Physical Activity: Not on file   Stress: Not on file   Social Connections: Not on file   Intimate Partner Violence: Low Risk  (11/30/2023)    Received from [facility]    History of Abuse     Have you ever been afraid of, threatened, neglected, or abused by someone?: No   Housing Stability: Not on file       Family History:  Family History   Adopted: Yes   Problem Relation Age of Onset    No Known Problems Mother     No Known Problems Father     No Known Problems Sister     No Known Problems Brother        Medications:    Current Outpatient Medications:     lisinopril (PRINIVIL) 40 MG tablet, Take 1 Tablet by mouth every day., Disp: 100 Tablet, Rfl: 3    pravastatin (PRAVACHOL) 10 MG Tab, Take 1 tablet by mouth daily, Disp: 100 Tablet, Rfl: 3    silver sulfADIAZINE (SILVADENE) 1 %  Cream, Apply to venous ulcers twice daily and cover with steril bandage, Disp: 50 g, Rfl: 0    atenolol (TENORMIN) 50 MG Tab, Take 1 tablet by mouth every day., Disp: 100 Tablet, Rfl: 3    amLODIPine (NORVASC) 10 MG Tab, Take 1 Tablet by mouth every day., Disp: 100 Tablet, Rfl: 3    Empagliflozin (JARDIANCE) 25 MG Tab, Take 1 tablet (25 mg) by mouth every day., Disp: 100 Tablet, Rfl: 3    metformin (GLUCOPHAGE) 1000 MG tablet, Take 1 Tablet by mouth 2 times a day with meals., Disp: 200 Tablet, Rfl: 3    Insulin Pen Needle 32 G x 4 mm, Use one pen needle in pen device to inject insulin once daily., Disp: 100 Each, Rfl: 1    Insulin Glargine-yfgn 100 UNIT/ML Solution Pen-injector, INJECT 10 UNITS UNDER THE SKIN DAILY OR AS DIRECTED (MAX 50 UNITS/DAY) (Patient taking differently: Inject 11 Units under the skin at bedtime. INJECT 11 UNITS UNDER THE SKIN DAILY OR AS DIRECTED (MAX 50 UNITS/DAY)), Disp: 20 mL, Rfl: 1    therapeutic multivitamin-minerals (THERAGRAN-M) Tab, Take 1 Tablet by mouth every day., Disp: , Rfl:     gabapentin (NEURONTIN) 100 MG Cap, Take 1 Capsule by mouth 3 times a day. (Patient taking differently: Take 10 mg by mouth every day.), Disp: 90 Capsule, Rfl: 0    vitamin D (CHOLECALCIFEROL) 1000 UNIT Tab, Take 1,000 Units by mouth every day., Disp: , Rfl:     glucose blood strip, 1 Each by Other route as needed., Disp: , Rfl:     STOOL SOFTENER PO, Take 1 Tab by mouth every day., Disp: , Rfl:     Labs: Reviewed    Physical Examination:  Vital signs: There were no vitals taken for this visit. There is no height or weight on file to calculate BMI.  General: No apparent distress, cooperative  Eyes: No scleral icterus or discharge  ENMT: Normal on external inspection of nose, lips, normal thyroid exam  Neck: No abnormal masses on inspection  Resp: Normal effort, clear to auscultation bilaterally   CVS: Regular rate and rhythm, S1 S2 normal, no murmur   Extremities: No edema  Abdomen: abdominal obesity  "present  Neuro: Alert and oriented  Skin: No rash  Psych: Normal mood and affect, intact memory and able to make informed decisions    Assessment and Plan:    Basic physiology of DMII was explained to patient as well as microvascular/macrovascular complications. The importance of increasing physical activity to improve diabetes control was discussed with the patient. Patient was also educated on changing diet and making better choices to help control blood sugar.    Patient is optimized on Jardiance and metformin.  Verified current insulin dosing as she continues to dose inconsistently based on blood glucose readings.  Refuses any further trials of GLP1a therapy.    Home FBG have decreased overall compared to couple months ago, but still quite labile and largely above goal.  A1c continues to worsen, now up to 10.3%    As above, did not have sufficient time to discuss nutrition and exercise habits, although patient mentions no changes.     Spent an extended period of time today discussing insulin therapy, dosing, risks of hypoglycemia, and testing.  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  Explained the MOA of long acting insulins, the need for consistently daily dosing and timing.  Through discussion, she is agreeable to the following plan.  Insulin glargine will be dosed at 8 units every day in the morning, just prior to her first meal.  She will no longer wait until several hours after rising.  I will contact her by phone in one week to discuss her FBG readings and adjust dose further at that time.    Follow Up:  One week by phone, three weeks in clinic    Thank you for allowing me to participate in the care of this patient.    Bob Vo, PharmD, BCACP  06/12/24    CC:   CHRISTINE Mcclain.      "

## 2024-06-14 ENCOUNTER — TELEPHONE (OUTPATIENT)
Dept: VASCULAR LAB | Facility: MEDICAL CENTER | Age: 79
End: 2024-06-14
Payer: MEDICARE

## 2024-06-14 NOTE — TELEPHONE ENCOUNTER
Caller:  Chelsy Blanco    Topic/issue:   Calling regarding medication being delivered for patients assistance.     Callback Number: [phone]    Thank you,   Lara CASTANEDA

## 2024-06-14 NOTE — TELEPHONE ENCOUNTER
Received patient assistance Maura. Will have Jose pharmacist bring next Wednesday. Called patient to notify her - no answer LVM.     Rina Isbell, IsaacD

## 2024-06-17 ENCOUNTER — PHARMACY VISIT (OUTPATIENT)
Dept: PHARMACY | Facility: MEDICAL CENTER | Age: 79
End: 2024-06-17
Payer: COMMERCIAL

## 2024-06-17 PROCEDURE — RXMED WILLOW AMBULATORY MEDICATION CHARGE: Performed by: NURSE PRACTITIONER

## 2024-06-19 ENCOUNTER — TELEPHONE (OUTPATIENT)
Dept: VASCULAR LAB | Facility: MEDICAL CENTER | Age: 79
End: 2024-06-19
Payer: MEDICARE

## 2024-06-26 ENCOUNTER — OFFICE VISIT (OUTPATIENT)
Dept: MEDICAL GROUP | Facility: PHYSICIAN GROUP | Age: 79
End: 2024-06-26
Payer: MEDICARE

## 2024-06-26 VITALS
OXYGEN SATURATION: 98 % | HEART RATE: 74 BPM | WEIGHT: 147.4 LBS | DIASTOLIC BLOOD PRESSURE: 78 MMHG | TEMPERATURE: 97.5 F | HEIGHT: 62 IN | SYSTOLIC BLOOD PRESSURE: 132 MMHG | RESPIRATION RATE: 18 BRPM | BODY MASS INDEX: 27.12 KG/M2

## 2024-06-26 DIAGNOSIS — I83.019 VENOUS ULCERS OF BOTH LOWER EXTREMITIES (HCC): ICD-10-CM

## 2024-06-26 DIAGNOSIS — E11.9 TYPE 2 DIABETES MELLITUS WITHOUT COMPLICATION, WITH LONG-TERM CURRENT USE OF INSULIN (HCC): ICD-10-CM

## 2024-06-26 DIAGNOSIS — I83.029 VENOUS ULCERS OF BOTH LOWER EXTREMITIES (HCC): ICD-10-CM

## 2024-06-26 DIAGNOSIS — L97.919 VENOUS ULCERS OF BOTH LOWER EXTREMITIES (HCC): ICD-10-CM

## 2024-06-26 DIAGNOSIS — Z79.4 TYPE 2 DIABETES MELLITUS WITHOUT COMPLICATION, WITH LONG-TERM CURRENT USE OF INSULIN (HCC): ICD-10-CM

## 2024-06-26 DIAGNOSIS — L97.929 VENOUS ULCERS OF BOTH LOWER EXTREMITIES (HCC): ICD-10-CM

## 2024-06-26 PROCEDURE — 3075F SYST BP GE 130 - 139MM HG: CPT | Performed by: NURSE PRACTITIONER

## 2024-06-26 PROCEDURE — 99214 OFFICE O/P EST MOD 30 MIN: CPT | Performed by: NURSE PRACTITIONER

## 2024-06-26 PROCEDURE — 3078F DIAST BP <80 MM HG: CPT | Performed by: NURSE PRACTITIONER

## 2024-06-26 RX ORDER — TACROLIMUS 1 MG/G
1 OINTMENT TOPICAL 2 TIMES DAILY
COMMUNITY
Start: 2024-05-13

## 2024-06-26 ASSESSMENT — ENCOUNTER SYMPTOMS
CLAUDICATION: 0
DOUBLE VISION: 0
POLYDIPSIA: 0
PALPITATIONS: 0
BLURRED VISION: 0
HEMOPTYSIS: 0
WEIGHT LOSS: 0
INSOMNIA: 0
SHORTNESS OF BREATH: 0
NERVOUS/ANXIOUS: 0
PHOTOPHOBIA: 0
ABDOMINAL PAIN: 0
EYE PAIN: 0
SPUTUM PRODUCTION: 0
ORTHOPNEA: 0
DIAPHORESIS: 0
NAUSEA: 0
MEMORY LOSS: 0
COUGH: 0
FEVER: 0
WHEEZING: 0
DEPRESSION: 0
CHILLS: 0
VOMITING: 0
DIARRHEA: 0

## 2024-06-26 NOTE — PROGRESS NOTES
Family Nurse Practitioner Student Note    Cosigner/Preceptor: MATI Wiley    Chief Complaint   Patient presents with    Follow-Up     DM ulcers & meds                                                                                                                                    HPI:   Zenaida is a 79 y/o female who presents to the office alone today to follow up regarding her diabetes medication and bilateral venous stasis ulcers to shins. She presents today with the following:     Type 2 DM: Last diabetes encounter was on 6/12/24 with pharmacotherapist for diabetes medication management. Hgb A1c increased from 9.4% on 3/6/24 to 10.3% on 6/12/24. Reports taking BG on glucometer at home in morning and again around noon daily. Reports BG averaging 147-175 in the mornings and then in the 300's by noon. Reports no symptoms or episodes of hypoglycemia. Reports continuing to take 10 units of insulin glargine at 1000 every morning after waking up daily at 4 am. Reviewed pharmacists note with her, reviewing previous agreed upon plan to take 8 units early in the morning for better control of blood glucose throughout the day. Reports that 1000 is the time that works for her and her lifestyle and admits she is unlikely to change the time of administration. We discussed at length the mechanism of her long acting medication and benefits on maintaining blood glucose levels when taken earlier when levels are low. Admits to being resistant to this due to her various responsibilities with her son, granddaughter, and many animals. Additionally we discussed her food intake. States she does not sit down for meals, eat at routine times, or prepare meals. Occasionally has yogurt or cereal with milk for breakfast. Ate a ham and mayonnaise [place] on wheat bread for lunch today. States she does eat vegetables. Occasionally eats cookies and had a doughnut and a slice of pie last week. Due to difficulty endorsing food intake, it  was suggested to keep a food diary of all her intake over the next week. She was agreeable to do this and is aware to bring this to her appointment next week with her pharmacotherapist. Also educated at length regarding potential complications from elevated glucose such as impaired wound , as well as all macrovascular complications that may occur.      She states that she had better glucose control on her previous oral medication glimepiride and would like to know why she cannot resume this medication. It was suggested that she discuss this further at her appointment next week.     Venous Stasis Ulcer: Bilateral healing venous stasis ulcers to anterior shins. Seen by wound care yesterday. Wounds are to be open to air per pt report. Applying mupirocin  2% ointment once daily. Wound bases healing without eschar, erythema, swelling, or purulence. No fevers, chills, or myalgias.     Current Outpatient Medications   Medication Sig Dispense Refill    mupirocin (BACTROBAN) 2 % Ointment As directed      tacrolimus (PROTOPIC) 0.1 % Ointment Apply 1 Application topically 2 times a day.      lisinopril (PRINIVIL) 40 MG tablet Take 1 Tablet by mouth every day. 100 Tablet 3    pravastatin (PRAVACHOL) 10 MG Tab Take 1 tablet by mouth daily 100 Tablet 3    atenolol (TENORMIN) 50 MG Tab Take 1 tablet by mouth every day. 100 Tablet 3    amLODIPine (NORVASC) 10 MG Tab Take 1 Tablet by mouth every day. 100 Tablet 3    Empagliflozin (JARDIANCE) 25 MG Tab Take 1 tablet (25 mg) by mouth every day. 100 Tablet 3    metformin (GLUCOPHAGE) 1000 MG tablet Take 1 Tablet by mouth 2 times a day with meals. 200 Tablet 3    Insulin Pen Needle 32 G x 4 mm Use one pen needle in pen device to inject insulin once daily. 100 Each 1    Insulin Glargine-yfgn 100 UNIT/ML Solution Pen-injector INJECT 10 UNITS UNDER THE SKIN DAILY OR AS DIRECTED (MAX 50 UNITS/DAY) (Patient taking differently: Inject 11 Units under the skin at bedtime. INJECT 11 UNITS UNDER  THE SKIN DAILY OR AS DIRECTED (MAX 50 UNITS/DAY)) 20 mL 1    therapeutic multivitamin-minerals (THERAGRAN-M) Tab Take 1 Tablet by mouth every day.      vitamin D (CHOLECALCIFEROL) 1000 UNIT Tab Take 1,000 Units by mouth every day.      glucose blood strip 1 Each by Other route as needed.      STOOL SOFTENER PO Take 1 Tab by mouth every day.      silver sulfADIAZINE (SILVADENE) 1 % Cream Apply to venous ulcers twice daily and cover with steril bandage (Patient not taking: Reported on 6/26/2024) 50 g 0    gabapentin (NEURONTIN) 100 MG Cap Take 1 Capsule by mouth 3 times a day. (Patient not taking: Reported on 6/26/2024) 90 Capsule 0     No current facility-administered medications for this visit.       Allergies as of 06/26/2024 - Reviewed 06/26/2024   Allergen Reaction Noted    Hydromorphone Rash 11/30/2023    Morphine Rash 11/30/2023    Oxycodone-acetaminophen Unspecified 11/30/2023    Percocet [oxycodone-acetaminophen] Swelling 01/11/2010    Demerol  08/31/2009    Semaglutide  05/29/2024    Statins [hmg-coa-r inhibitors]  08/31/2009        ROS:  Review of Systems   Constitutional:  Negative for chills, diaphoresis, fever, malaise/fatigue and weight loss.   Eyes:  Negative for blurred vision, double vision, photophobia and pain.   Respiratory:  Negative for cough, hemoptysis, sputum production, shortness of breath and wheezing.    Cardiovascular:  Negative for chest pain, palpitations, orthopnea, claudication and leg swelling.   Gastrointestinal:  Negative for abdominal pain, diarrhea, nausea and vomiting.   Genitourinary:  Negative for frequency and urgency.   Skin:  Negative for itching and rash.   Endo/Heme/Allergies:  Negative for polydipsia.   Psychiatric/Behavioral:  Negative for depression and memory loss. The patient is not nervous/anxious and does not have insomnia.      All systems negative expect as addressed in assessment and plan.     Objective:  /78   Pulse 74   Temp 36.4 °C (97.5 °F)  "(Temporal)   Resp 18   Ht 1.575 m (5' 2\")   Wt 66.9 kg (147 lb 6.4 oz)   SpO2 98%   BMI 26.96 kg/m²     Physical Exam  Constitutional:       General: She is not in acute distress.     Appearance: Normal appearance. She is not ill-appearing or diaphoretic.   Eyes:      General: No scleral icterus.     Extraocular Movements: Extraocular movements intact.      Conjunctiva/sclera: Conjunctivae normal.      Pupils: Pupils are equal, round, and reactive to light.   Cardiovascular:      Rate and Rhythm: Normal rate and regular rhythm.      Pulses: Normal pulses.      Heart sounds: Normal heart sounds. No murmur heard.     No friction rub. No gallop.   Neurological:      General: No focal deficit present.      Mental Status: She is alert and oriented to person, place, and time.   Psychiatric:         Attention and Perception: Attention and perception normal.         Mood and Affect: Mood and affect normal.         Behavior: Behavior is not withdrawn.         Cognition and Memory: Cognition and memory normal.         Judgment: Judgment normal.         Assessment and Plan:  78 y.o. female with the following issues.    1. Type 2 diabetes mellitus without complication, with long-term current use of insulin (HCC)  Chronic and stable condition  Hgb A1c in 2.5 months  Follow up as planned with pharmacotherapist on 7/3/2024  Follow up in office in 3 months  Keep a food diary that includes a long of all meals, snacks, desserts, and beverage. Include any added sugar or creamer. Bring this food journal and your blood glucose journal with you to your appointment next week and to your follow up with us in 3 months    2. Venous ulcers of both lower extremities (HCC)  Chronic and stable   Continue to follow and treat with wound care clinic.  Schedule an appointment for worsening appearance of ulcers, fever, chills, myalgias, redness, or drainage.   You may apply an gentle, unscented lotion to dry skin surrounding the wounds. You may " benefit from compression stockings to prevent recurrence in the future. As a reminder, Improved glucose control can lead to better wound healing outcomes, reduced risk of infection, and overall enhanced recovery.    Please follow up sooner for new or worsening symptoms.    Present to the ER if you develop chest pain, numbness, slurred speech, difficulty talking, or moving an extremity.

## 2024-07-03 ENCOUNTER — OFFICE VISIT (OUTPATIENT)
Dept: MEDICAL GROUP | Facility: PHYSICIAN GROUP | Age: 79
End: 2024-07-03
Payer: MEDICARE

## 2024-07-03 VITALS
HEIGHT: 62 IN | HEART RATE: 75 BPM | SYSTOLIC BLOOD PRESSURE: 145 MMHG | DIASTOLIC BLOOD PRESSURE: 94 MMHG | BODY MASS INDEX: 26.96 KG/M2

## 2024-07-03 DIAGNOSIS — Z79.4 TYPE 2 DIABETES MELLITUS WITHOUT COMPLICATION, WITH LONG-TERM CURRENT USE OF INSULIN (HCC): ICD-10-CM

## 2024-07-03 DIAGNOSIS — E11.9 TYPE 2 DIABETES MELLITUS WITHOUT COMPLICATION, WITH LONG-TERM CURRENT USE OF INSULIN (HCC): ICD-10-CM

## 2024-07-03 LAB — GLUCOSE BLD-MCNC: 206 MG/DL (ref 65–99)

## 2024-07-03 PROCEDURE — 3077F SYST BP >= 140 MM HG: CPT | Performed by: PHYSICIAN ASSISTANT

## 2024-07-03 PROCEDURE — 99211 OFF/OP EST MAY X REQ PHY/QHP: CPT | Performed by: PHYSICIAN ASSISTANT

## 2024-07-03 PROCEDURE — 3080F DIAST BP >= 90 MM HG: CPT | Performed by: PHYSICIAN ASSISTANT

## 2024-07-03 PROCEDURE — 82962 GLUCOSE BLOOD TEST: CPT

## 2024-07-16 ENCOUNTER — OFFICE VISIT (OUTPATIENT)
Dept: MEDICAL GROUP | Facility: PHYSICIAN GROUP | Age: 79
End: 2024-07-16
Payer: MEDICARE

## 2024-07-16 VITALS
SYSTOLIC BLOOD PRESSURE: 132 MMHG | HEIGHT: 62 IN | BODY MASS INDEX: 27.23 KG/M2 | HEART RATE: 77 BPM | WEIGHT: 148 LBS | DIASTOLIC BLOOD PRESSURE: 84 MMHG | RESPIRATION RATE: 18 BRPM | TEMPERATURE: 98 F | OXYGEN SATURATION: 99 %

## 2024-07-16 DIAGNOSIS — L97.929 VENOUS ULCERS OF BOTH LOWER EXTREMITIES (HCC): ICD-10-CM

## 2024-07-16 DIAGNOSIS — I83.019 VENOUS ULCERS OF BOTH LOWER EXTREMITIES (HCC): ICD-10-CM

## 2024-07-16 DIAGNOSIS — M54.50 RIGHT LUMBAR PAIN: ICD-10-CM

## 2024-07-16 DIAGNOSIS — L97.919 VENOUS ULCERS OF BOTH LOWER EXTREMITIES (HCC): ICD-10-CM

## 2024-07-16 DIAGNOSIS — R29.898 RIGHT LEG WEAKNESS: ICD-10-CM

## 2024-07-16 DIAGNOSIS — I83.029 VENOUS ULCERS OF BOTH LOWER EXTREMITIES (HCC): ICD-10-CM

## 2024-07-16 PROCEDURE — 99214 OFFICE O/P EST MOD 30 MIN: CPT | Performed by: NURSE PRACTITIONER

## 2024-07-16 PROCEDURE — 3079F DIAST BP 80-89 MM HG: CPT | Performed by: NURSE PRACTITIONER

## 2024-07-16 PROCEDURE — 3075F SYST BP GE 130 - 139MM HG: CPT | Performed by: NURSE PRACTITIONER

## 2024-07-16 ASSESSMENT — ENCOUNTER SYMPTOMS
HEMOPTYSIS: 0
SENSORY CHANGE: 0
SHORTNESS OF BREATH: 0
COUGH: 0
INSOMNIA: 0
DIZZINESS: 0
WHEEZING: 0
FLANK PAIN: 0
SPEECH CHANGE: 0
ORTHOPNEA: 0
NERVOUS/ANXIOUS: 0
NECK PAIN: 0
HEADACHES: 0
CHILLS: 0
TREMORS: 0
DEPRESSION: 0
FOCAL WEAKNESS: 0
MYALGIAS: 1
SPUTUM PRODUCTION: 0
FALLS: 1
PALPITATIONS: 0
CONSTIPATION: 0
DIARRHEA: 0
WEIGHT LOSS: 0
WEAKNESS: 1
CLAUDICATION: 0
BACK PAIN: 1
TINGLING: 0
FEVER: 0
DIAPHORESIS: 0

## 2024-07-24 ENCOUNTER — OFFICE VISIT (OUTPATIENT)
Dept: MEDICAL GROUP | Facility: PHYSICIAN GROUP | Age: 79
End: 2024-07-24
Payer: MEDICARE

## 2024-07-24 VITALS — RESPIRATION RATE: 14 BRPM | BODY MASS INDEX: 27.23 KG/M2 | HEIGHT: 62 IN | WEIGHT: 148 LBS

## 2024-07-24 DIAGNOSIS — E11.40 TYPE 2 DIABETES MELLITUS WITH DIABETIC NEUROPATHY, WITHOUT LONG-TERM CURRENT USE OF INSULIN (HCC): ICD-10-CM

## 2024-07-24 PROCEDURE — 99211 OFF/OP EST MAY X REQ PHY/QHP: CPT | Performed by: STUDENT IN AN ORGANIZED HEALTH CARE EDUCATION/TRAINING PROGRAM

## 2024-07-31 PROBLEM — L97.921 NON-PRESSURE CHRONIC ULCER OF LEFT LOWER LEG, LIMITED TO BREAKDOWN OF SKIN (HCC): Status: ACTIVE | Noted: 2024-07-31

## 2024-07-31 PROBLEM — E11.622 DM TYPE 2, CONTROLLED, WITH LOWER EXTREMITY ULCER (HCC): Status: ACTIVE | Noted: 2024-07-31

## 2024-07-31 PROBLEM — L97.911 NON-PRESSURE CHRONIC ULCER RIGHT LOWER LEG, LIMITED TO BREAKDOWN SKIN (HCC): Status: ACTIVE | Noted: 2024-07-31

## 2024-07-31 PROBLEM — L97.909 DM TYPE 2, CONTROLLED, WITH LOWER EXTREMITY ULCER (HCC): Status: ACTIVE | Noted: 2024-07-31

## 2024-08-14 ENCOUNTER — OFFICE VISIT (OUTPATIENT)
Dept: MEDICAL GROUP | Facility: PHYSICIAN GROUP | Age: 79
End: 2024-08-14
Payer: MEDICARE

## 2024-08-14 DIAGNOSIS — E11.40 TYPE 2 DIABETES MELLITUS WITH DIABETIC NEUROPATHY, WITHOUT LONG-TERM CURRENT USE OF INSULIN (HCC): ICD-10-CM

## 2024-08-14 PROCEDURE — 99211 OFF/OP EST MAY X REQ PHY/QHP: CPT | Performed by: PHYSICIAN ASSISTANT

## 2024-08-14 NOTE — PROGRESS NOTES
"Patient Consult Note - Follow Up Visit  Primary care physician: KIERA Mcclain    Reason for consult: Management of Uncontrolled Type 2 Diabetes    Time IN:  1:01pm  Time OUT:  1:18pm    HPI:  Zenaida Rios is a 78 y.o. old patient who comes in today for evaluation of above stated problem.    Most Recent HbA1c:   Lab Results   Component Value Date/Time    HBA1C 10.3 (A) 06/12/2024 02:26 PM      Reliable and Exact Care Diabetes Score    Displays the Diabetes REC Score.  A patient gets 1 point for each measure for a maximum of 7 points   0  Measures Not Met            Current Diabetes Medication Regimen  Metformin IR: 1000 mg BID  SGLT-2 Inhibitor: empagliflozin (Jardiance) 25 mg daily  Basal Insulin: Glargine - continues to self adjust dose down anytime blood glucose begins to improve, currently dosing 6 units in the AM and between 0 - 2 units in the PM     Previous Diabetes Medications and Reason for Discontinuation  Glimeperide - stopped a long time ago  Pioglitazone - pt states \"horrible reaction\" many years ago that she cannot recall   Rybelsus   lethargy, fatigue, and abd pain initially  was able to take up to 14mg 4/29/24  stopped    Discussed FBG goal of , 2hrPP <180, and A1c <7.0%.  Blood glucose log difficult to discern, she is testing up to three times daily.  Glucose running from 142-362 on first test of day  200+ to as high as >400 in the latter part of the day.   Hypoglycemia:  None    Diet/Exercise:  Denies any appreciable changes to nutrition or exercise patterns.  She does comment that macaroni and cheese was for dinner a couple nights recently.    Current Exercise - walking daily; limited d/t RLE pain     Dietary - \"adequate diet\" tries to eat low calorie, low carb  Breakfast - None typically  Lunch - Cheese sticks, sandwich (PB), \"whatever is in the refrigerator\"  Dinner - Cooks meals at home. Typically meat, potato, lots of vegetables. Occasionally skips.  Snacks - " Cheese  Desserts - Avoids sweets mostly  Drinks - Coffee w/ creamer 1-2x per day, water mostly    Preventative Management  BP regimen (ACEi/ARB): Lisinopril 40mg QD  BP <140/90: Yes  Statin: pravastatin (Pravachol) 10 mg daily  LDL <100: No  Lab Results   Component Value Date/Time    CHOLSTRLTOT 179 07/15/2021 07:40 AM     (H) 07/15/2021 07:40 AM    HDL 43 07/15/2021 07:40 AM    TRIGLYCERIDE 153 (H) 07/15/2021 07:40 AM       Last Microalbumin/Cr:  Lab Results   Component Value Date/Time    MALBCRT 82 (H) 07/20/2021 08:15 AM    MICROALBUR 5.4 07/20/2021 08:15 AM     Last A1c:  Lab Results   Component Value Date/Time    HBA1C 10.3 (A) 06/12/2024 02:26 PM      Last Retinal Scan: up to date, 9/0223     Monofilament exam: up to date, 8/2023         Updated caregaps    ROS:  Constitutional: No weight loss  Cardiac: No palpitations or racing heart  Resp: No shortness of breath  Neuro: No numbness or tinging in feet  Endo: No heat or cold intolerance, no polyuria or polydipsia  All other systems were reviewed and were negative.    Past Medical History:  Patient Active Problem List    Diagnosis Date Noted    Non-pressure chronic ulcer of left lower leg, limited to breakdown of skin ([facility]) 07/31/2024    Non-pressure chronic ulcer right lower leg, limited to breakdown skin ([facility]) 07/31/2024    DM type 2, controlled, with lower extremity ulcer ([facility]) 07/31/2024    Transient right leg weakness 03/27/2024    Neuroforaminal stenosis of thoracic spine 12/28/2023    Wound healing, delayed 11/29/2023    Poor dentition 11/29/2023    ELICEO (acute kidney injury) ([facility]) 10/24/2023    Wound cellulitis 10/24/2023    History of lumbar fusion 08/15/2023    On economic assistance program 05/24/2023    Dyslipidemia 11/19/2019    Type 2 diabetes mellitus with diabetic neuropathy, without long-term current use of insulin ([facility])     Neuropathy 01/17/2019    Vitamin D deficiency 01/17/2019    Obesity (BMI 30-39.9) 07/17/2018    Benign essential  HTN 05/21/2018       Past Surgical History:  Past Surgical History:   Procedure Laterality Date    FUSION, SPINE, LUMBAR, PLIF  1/11/2010    Performed by LYNNETTE SMILEY at SURGERY [facility]    LAMINOTOMY  1/11/2010    Performed by LYNNETTE SMILEY at SURGERY [facility]    LUMBAR LAMINECTOMY DISKECTOMY  8/31/2009    OTHER ORTHOPEDIC SURGERY  1989    tibia has a tanna knee to ankle, fibula is resected    PRIMARY C SECTION  1988    OTHER  1975    left lumpectomt in breast - benign    OTHER         Allergies:  Hydromorphone, Morphine, Oxycodone-acetaminophen, Percocet [oxycodone-acetaminophen], Demerol, Semaglutide, and Statins [hmg-coa-r inhibitors]    Social History:  Social History     Socioeconomic History    Marital status:      Spouse name: Not on file    Number of children: Not on file    Years of education: Not on file    Highest education level: Not on file   Occupational History    Not on file   Tobacco Use    Smoking status: Never    Smokeless tobacco: Never   Vaping Use    Vaping status: Never Used   Substance and Sexual Activity    Alcohol use: No    Drug use: No    Sexual activity: Yes     Partners: Male     Comment:    Other Topics Concern    Not on file   Social History Narrative    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      Social Determinants of Health     Financial Resource Strain: Not on file   Food Insecurity: Not on file   Transportation Needs: Not on file   Physical Activity: Not on file   Stress: Not on file   Social Connections: Not on file   Intimate Partner Violence: Low Risk  (11/30/2023)    Received from [facility], [facility]    History of Abuse     Have you ever been afraid of, threatened, neglected, or abused by someone?: No   Housing Stability: Not on file       Family History:  Family History   Adopted: Yes   Problem Relation Age of Onset    No Known Problems Mother     No Known Problems Father     No Known Problems Sister     No Known  Problems Brother        Medications:    Current Outpatient Medications:     mupirocin (BACTROBAN) 2 % Ointment, As directed, Disp: , Rfl:     tacrolimus (PROTOPIC) 0.1 % Ointment, Apply 1 Application topically 2 times a day., Disp: , Rfl:     lisinopril (PRINIVIL) 40 MG tablet, Take 1 Tablet by mouth every day., Disp: 100 Tablet, Rfl: 3    pravastatin (PRAVACHOL) 10 MG Tab, Take 1 tablet by mouth daily, Disp: 100 Tablet, Rfl: 3    silver sulfADIAZINE (SILVADENE) 1 % Cream, Apply to venous ulcers twice daily and cover with steril bandage (Patient not taking: Reported on 6/26/2024), Disp: 50 g, Rfl: 0    atenolol (TENORMIN) 50 MG Tab, Take 1 tablet by mouth every day., Disp: 100 Tablet, Rfl: 3    amLODIPine (NORVASC) 10 MG Tab, Take 1 Tablet by mouth every day., Disp: 100 Tablet, Rfl: 3    Empagliflozin (JARDIANCE) 25 MG Tab, Take 1 tablet (25 mg) by mouth every day., Disp: 100 Tablet, Rfl: 3    metformin (GLUCOPHAGE) 1000 MG tablet, Take 1 Tablet by mouth 2 times a day with meals., Disp: 200 Tablet, Rfl: 3    Insulin Pen Needle 32 G x 4 mm, Use one pen needle in pen device to inject insulin once daily., Disp: 100 Each, Rfl: 1    Insulin Glargine-yfgn 100 UNIT/ML Solution Pen-injector, INJECT 10 UNITS UNDER THE SKIN DAILY OR AS DIRECTED (MAX 50 UNITS/DAY) (Patient taking differently: Inject 11 Units under the skin at bedtime. INJECT 11 UNITS UNDER THE SKIN DAILY OR AS DIRECTED (MAX 50 UNITS/DAY)), Disp: 20 mL, Rfl: 1    therapeutic multivitamin-minerals (THERAGRAN-M) Tab, Take 1 Tablet by mouth every day., Disp: , Rfl:     gabapentin (NEURONTIN) 100 MG Cap, Take 1 Capsule by mouth 3 times a day. (Patient not taking: Reported on 6/26/2024), Disp: 90 Capsule, Rfl: 0    vitamin D (CHOLECALCIFEROL) 1000 UNIT Tab, Take 1,000 Units by mouth every day., Disp: , Rfl:     glucose blood strip, 1 Each by Other route as needed., Disp: , Rfl:     STOOL SOFTENER PO, Take 1 Tab by mouth every day., Disp: , Rfl:     Labs:  Reviewed    Physical Examination:  Vital signs: There were no vitals taken for this visit. There is no height or weight on file to calculate BMI.  General: No apparent distress, cooperative  Eyes: No scleral icterus or discharge  ENMT: Normal on external inspection of nose, lips, normal thyroid exam  Neck: No abnormal masses on inspection  Resp: Normal effort, clear to auscultation bilaterally   CVS: Regular rate and rhythm, S1 S2 normal, no murmur   Extremities: No edema  Abdomen: abdominal obesity present  Neuro: Alert and oriented  Skin: No rash  Psych: Normal mood and affect, intact memory and able to make informed decisions    Assessment and Plan:    Basic physiology of DMII was explained to patient as well as microvascular/macrovascular complications. The importance of increasing physical activity to improve diabetes control was discussed with the patient. Patient was also educated on changing diet and making better choices to help control blood sugar.    Patient is optimized on Jardiance and metformin.  Verified current insulin dosing as she continues to dose inconsistently based on blood glucose readings,  most notably, decreasing her insulin dose if blood glucose improves.  Refuses any further trials of GLP1a therapy.    Home glucose readings started to look better couple weeks ago, but patient then held insulin for a day or two and decreased dosing by at least 50% on most days since.  Once again, had extensive conversation about her insulin dosing and the need for consistent day to day dosing.  Explained that she is inhibiting her progress with controlling diabetes by actively changing and decreasing insulin dosing day by day.    Ultimately, she is agreeable to begin a consistent daily dosing, although this provider does not believe she will keep things in place based on past visits.    BEGIN dosing insulin glargine 5 units twice daily EVERY DAY, DO NOT DECREASE DOSE.  Advised that she can increase dosing  to 6 units twice daily if glucose readings remain >250.  Patient voices understanding of these instructions.  Advised to avoid macaroni and cheese and other carb dense foods, but did not have ample time to discuss further nutrition changes.    Follow Up:  Three weeks    Thank you for allowing me to participate in the care of this patient.    Bob Vo, PharmD, [facility]  08/14/24    CC:   KIERA Mcclain       26-Jul-2017 15:50

## 2024-08-18 PROCEDURE — RXMED WILLOW AMBULATORY MEDICATION CHARGE: Performed by: NURSE PRACTITIONER

## 2024-08-19 ENCOUNTER — PHARMACY VISIT (OUTPATIENT)
Dept: PHARMACY | Facility: MEDICAL CENTER | Age: 79
End: 2024-08-19
Payer: COMMERCIAL

## 2024-08-19 PROCEDURE — RXMED WILLOW AMBULATORY MEDICATION CHARGE: Performed by: NURSE PRACTITIONER

## 2024-08-29 ENCOUNTER — APPOINTMENT (OUTPATIENT)
Dept: MEDICAL GROUP | Facility: PHYSICIAN GROUP | Age: 79
End: 2024-08-29
Payer: MEDICARE

## 2024-09-05 ENCOUNTER — APPOINTMENT (OUTPATIENT)
Dept: MEDICAL GROUP | Facility: PHYSICIAN GROUP | Age: 79
End: 2024-09-05
Payer: MEDICARE

## 2024-09-05 VITALS
DIASTOLIC BLOOD PRESSURE: 74 MMHG | HEART RATE: 76 BPM | TEMPERATURE: 97 F | WEIGHT: 142 LBS | RESPIRATION RATE: 18 BRPM | SYSTOLIC BLOOD PRESSURE: 128 MMHG | HEIGHT: 62 IN | OXYGEN SATURATION: 99 % | BODY MASS INDEX: 26.13 KG/M2

## 2024-09-05 DIAGNOSIS — M48.061 SPINAL STENOSIS OF LUMBAR REGION WITHOUT NEUROGENIC CLAUDICATION: ICD-10-CM

## 2024-09-05 DIAGNOSIS — M48.04 NEUROFORAMINAL STENOSIS OF THORACIC SPINE: ICD-10-CM

## 2024-09-05 DIAGNOSIS — L97.909 DM TYPE 2, CONTROLLED, WITH LOWER EXTREMITY ULCER (HCC): ICD-10-CM

## 2024-09-05 DIAGNOSIS — E11.622 DM TYPE 2, CONTROLLED, WITH LOWER EXTREMITY ULCER (HCC): ICD-10-CM

## 2024-09-05 PROCEDURE — 3078F DIAST BP <80 MM HG: CPT | Performed by: NURSE PRACTITIONER

## 2024-09-05 PROCEDURE — 99214 OFFICE O/P EST MOD 30 MIN: CPT | Performed by: NURSE PRACTITIONER

## 2024-09-05 PROCEDURE — 3074F SYST BP LT 130 MM HG: CPT | Performed by: NURSE PRACTITIONER

## 2024-09-05 RX ORDER — GABAPENTIN 100 MG/1
100 CAPSULE ORAL 3 TIMES DAILY
Qty: 90 CAPSULE | Refills: 0 | Status: SHIPPED | OUTPATIENT
Start: 2024-09-05

## 2024-09-05 ASSESSMENT — ENCOUNTER SYMPTOMS
CHILLS: 0
PALPITATIONS: 0
FEVER: 0
MYALGIAS: 1
BACK PAIN: 1
DIZZINESS: 0
WEIGHT LOSS: 0
SHORTNESS OF BREATH: 0
HEADACHES: 0

## 2024-09-05 NOTE — PROGRESS NOTES
Verbal consent was acquired by the patient to use [employer] ambient listening note generation during this visit     CC:  Chief Complaint   Patient presents with    Follow-Up     R hip pain     Medication Refill     Gabapentin 100mg        Zenaida SCOTT Blanca 78 y.o. female  patient presenting for     History of Present Illness  The patient presents for evaluation of right leg pain.    She reports being able to walk without the aid of a cane but is using it today. She experiences persistent pain in her right leg. This issue dates back to her teenage years when she sustained multiple fractures in her right lower leg. In the 1980s, she shattered her tibia and fibula while standing on snow, leading to the resection of her fibula and the insertion of a metal tanna into her tibia. She has been dealing with complications from this injury throughout her life.    Three months ago, she woke up to find her leg very weak, although she could still move it. Despite the weakness, she was able to get out of bed and take her medication. She describes the pain as localized to the top of her hip bone, not radiating, and constant. The pain does not interfere with her ability to bend or touch her toes, but it does intensify when she leans back. She does not experience numbness or tingling but notes that her leg feels weak and occasionally gives way, causing sharp pain.    She is seeking a prescription for gabapentin 100 mg. She believes the lower dosage will help manage her nerve-related issues. She also mentions that she was unable to attend physical therapy due to scheduling conflicts but plans to reschedule. She has been managing her pain at home and has noticed some improvement over the past month and a half. She was able to see Dr. Mendez in January but has not been abl to follow up.        Retinal exam completed: ordered and completed today 9/5/2024  LDL: new labs ordered  ACEi/ARB? lisinopril  Statin? atorvastatin  Urine  "Albumin/creatinine: new labs ordered  Tobacco use: none  BP control: stable   Concomitant HTN? Stable- yes /74 in office- lisinopril, atenolol, amlodipine  A1C 10.3 % 6/12/2024  A1C due: 9/12/2024  Last monofilament exam- completed today   Nightly foot checks? no  Patient tolerates medications well with no significant or bothersome side effects.   Denies polyuria, polydipsia, polyphagia.             Review of Systems   Constitutional:  Negative for chills, fever, malaise/fatigue and weight loss.   Respiratory:  Negative for shortness of breath.    Cardiovascular:  Negative for chest pain and palpitations.   Musculoskeletal:  Positive for back pain and myalgias.   Neurological:  Negative for dizziness and headaches.       /74   Pulse 76   Temp 36.1 °C (97 °F) (Temporal)   Resp 18   Ht 1.575 m (5' 2\")   Wt 64.4 kg (142 lb)   SpO2 99% , Body mass index is 25.97 kg/m².    Physical Exam  Constitutional:       General: She is not in acute distress.     Appearance: Normal appearance. She is not ill-appearing.   Pulmonary:      Effort: Pulmonary effort is normal.   Musculoskeletal:         General: Tenderness present. No signs of injury.      Right hip: Tenderness present. No bony tenderness or crepitus. Decreased range of motion. Decreased strength.      Left hip: Normal.      Right lower leg: No edema.      Left lower leg: No edema.        Legs:    Neurological:      Mental Status: She is alert and oriented to person, place, and time.   Psychiatric:         Mood and Affect: Mood normal.         Behavior: Behavior normal.         Thought Content: Thought content normal.         Judgment: Judgment normal.           Results      Assessment and Plan    Assessment & Plan       1. Spinal stenosis of lumbar region without neurogenic claudication  Chronic and stable condition   - gabapentin (NEURONTIN) 100 MG Cap; Take 1 Capsule by mouth 3 times a day.  Dispense: 90 Capsule; Refill: 0  - Referral to Physical " Therapy    2. Neuroforaminal stenosis of thoracic spine  Chronic and stable condition   - gabapentin (NEURONTIN) 100 MG Cap; Take 1 Capsule by mouth 3 times a day.  Dispense: 90 Capsule; Refill: 0  - Referral to Physical Therapy    3. DM type 2, controlled, with lower extremity ulcer (HCC)  Chronic and stable condition   - Hemoglobin A1C; Future  - Diabetic Monofilament LE Exam  Monofilament testing with a 10 gram force: sensation intact: intact bilaterally  Visual Inspection: Feet without maceration, ulcers, fissures.  Pedal pulses: intact bilaterally  - Lipid Profile; Future  - Microalbumin Creat Ratio Urine (Lab Collect); Future  - POCT Retinal Eye Exam  - Comp Metabolic Panel; Future       Discussed with patient possible alternative diagnoses, patient is to take all medications as prescribed.     If symptoms persist FU w/PCP, if symptoms worsen go to emergency room.     If experiencing any side effects from prescribed medications reports to the office immediately or go to emergency room.    Reviewed indication, dosage, usage and potential adverse effects of prescribed medications.     Reviewed risks and benefits of treatment plan. Patient verbalizes understanding of all instruction and verbally agrees to plan.    Return for pending new labs.    This note was created using voice recognition software ([employer]). The accuracy of the dictation is limited by the abilities of the software. I have reviewed the note prior to signing, however some errors in grammar and context are still possible. If you have any questions related to this note please do not hesitate to contact our office.

## 2024-09-11 ENCOUNTER — APPOINTMENT (OUTPATIENT)
Dept: MEDICAL GROUP | Facility: PHYSICIAN GROUP | Age: 79
End: 2024-09-11
Payer: MEDICARE

## 2024-09-12 ENCOUNTER — PHARMACY VISIT (OUTPATIENT)
Dept: PHARMACY | Facility: MEDICAL CENTER | Age: 79
End: 2024-09-12
Payer: COMMERCIAL

## 2024-09-12 PROCEDURE — RXMED WILLOW AMBULATORY MEDICATION CHARGE: Performed by: NURSE PRACTITIONER

## 2024-09-18 ENCOUNTER — OFFICE VISIT (OUTPATIENT)
Dept: MEDICAL GROUP | Facility: PHYSICIAN GROUP | Age: 79
End: 2024-09-18
Payer: MEDICARE

## 2024-09-18 DIAGNOSIS — E11.40 TYPE 2 DIABETES MELLITUS WITH DIABETIC NEUROPATHY, WITHOUT LONG-TERM CURRENT USE OF INSULIN (HCC): ICD-10-CM

## 2024-09-18 LAB
HBA1C MFR BLD: 9.4 % (ref ?–5.8)
POCT INT CON NEG: NEGATIVE
POCT INT CON POS: POSITIVE

## 2024-09-18 PROCEDURE — 99211 OFF/OP EST MAY X REQ PHY/QHP: CPT | Performed by: NURSE PRACTITIONER

## 2024-09-18 PROCEDURE — 83036 HEMOGLOBIN GLYCOSYLATED A1C: CPT | Performed by: NURSE PRACTITIONER

## 2024-09-18 NOTE — PROGRESS NOTES
"Patient Consult Note    TIME IN: 3:46  TIME OUT: 4:12    Primary care physician: KIERA Mcclain    Reason for consult: Management of Uncontrolled Type 2 Diabetes    HPI:  Zenaida Rios is a 78 y.o. old patient who comes in today for evaluation of above stated problem.    Allergies  Hydromorphone, Morphine, Oxycodone-acetaminophen, Percocet [oxycodone-acetaminophen], Demerol, Semaglutide, and Statins [hmg-coa-r inhibitors]    Current Diabetes Medication Regimen  Metformin IR: 1000 mg BID  SGLT-2 Inhibitor: empagliflozin (Jardiance) 25 mg daily  Basal Insulin: Glargine - 4-6 units BID     Previous Diabetes Medications and Reason for Discontinuation  Glimeperide - stopped a long time ago  Pioglitazone - pt states \"horrible reaction\" many years ago that she cannot recall   Rybelsus   lethargy, fatigue, and abd pain initially  was able to take up to 14mg 4/29/24  stopped    Potential Barriers to Care:  Adherence: denies missed doses  Side effects: none  Affordability: reports jardiance is expensive, but that it is doable at this time.     SMBG  Pt has home glucometer and proper testing technique - yes    Pt reports blood sugars:   Before Breakfast: 206   Before Lunch: 156  Before Dinner: 202  Other times: Mostly 200s as high as 350 as low as 127 - difficult to interpret BG values. Pt continues to test TID    Hypoglycemia  Hypoglycemia awareness: Yes  Nocturnal hypoglycemia: None  Hypoglycemia:  None  Pt's treatment of Hypoglycemia  Discussed 15:15 Rule    Diet/Exercise:  Denies any appreciable changes to nutrition or exercise patterns.   Current Exercise - walking daily; limited d/t RLE pain     Dietary - \"adequate diet\" tries to eat low calorie, low carb  Breakfast - None typically  Lunch - Cheese sticks, sandwich (PB), \"whatever is in the refrigerator\"  Dinner - Cooks meals at home. Typically meat, potato, lots of vegetables. Occasionally skips.  Snacks - Cheese  Desserts - Avoids sweets " mostly  Drinks - Coffee w/ creamer 1-2x per day, water mostly    Labs  Lab Results   Component Value Date/Time    HBA1C 9.4 (A) 09/18/2024 04:03 PM    HBA1C 10.3 (A) 06/12/2024 02:26 PM    HBA1C 9.4 (A) 03/06/2024 11:28 AM      Lab Results   Component Value Date/Time    SODIUM 139 07/15/2021 07:40 AM    POTASSIUM 4.5 07/15/2021 07:40 AM    CHLORIDE 103 07/15/2021 07:40 AM    CO2 22 07/15/2021 07:40 AM    GLUCOSE 131 (H) 07/15/2021 07:40 AM    BUN 16 07/15/2021 07:40 AM    CREATININE 0.91 07/15/2021 07:40 AM     Lab Results   Component Value Date/Time    ALKPHOSPHAT 75 07/15/2021 07:40 AM    ASTSGOT 27 07/15/2021 07:40 AM    ALTSGPT 24 07/15/2021 07:40 AM    TBILIRUBIN 0.3 07/15/2021 07:40 AM    ALBUMIN 4.6 07/15/2021 07:40 AM      Lab Results   Component Value Date/Time    CHOLSTRLTOT 179 07/15/2021 07:40 AM     (H) 07/15/2021 07:40 AM    HDL 43 07/15/2021 07:40 AM    TRIGLYCERIDE 153 (H) 07/15/2021 07:40 AM       Lab Results   Component Value Date/Time    MALBCRT 82 (H) 07/20/2021 08:15 AM    MICROALBUR 5.4 07/20/2021 08:15 AM       Physical Examination:  Vital signs: There were no vitals taken for this visit. There is no height or weight on file to calculate BMI.    Assessment and Plan:    1. DM2  Discussed Goals: FBG 80 - 130, 2hPP < 180, a1c < 8.0%  Last a1c drawn on 9/18/24 was 9.4%, which is not at goal and has improved since the previous reading  Pt continues to titrate insulin sporadically due to fears of hypoglycemia despite lowest BG reading from previous month being 127 mg/dL.  Diet and Exercise remain unchanged from previous appointment.     - Medication changes:  INCREASE Lantus dose to 6 units SQ BID.   Pt states she is comfortable with increasing to 6 units at this time despite expressing fears of hypoglycemia from large doses of insulin.   Educated pt regarding long-acting nature of her insulin and that she needs to maintain a consistent dose to avoid large swings in BG values.    -  Continue:  Metformin IR 1000mg BID  Jardiance 25mg daily      - Lifestyle changes:  Exercise Goal - Increase as tolerated, limited due to R knee pain and requiring a cane today.  Dietary Goal - Limit carbohydrates and increase lean meats/veggies as able.     - Preventative management:  REC DM Score: 0  Care gaps addressed:   N/A  Care gaps updated in Health Maintenance    Follow Up:  6 weeks    Jonatan Lopez, PharmD    CC:   Isaac AvilesD

## 2024-09-22 PROCEDURE — RXMED WILLOW AMBULATORY MEDICATION CHARGE: Performed by: NURSE PRACTITIONER

## 2024-10-03 ENCOUNTER — PHARMACY VISIT (OUTPATIENT)
Dept: PHARMACY | Facility: MEDICAL CENTER | Age: 79
End: 2024-10-03
Payer: COMMERCIAL

## 2024-10-03 PROCEDURE — RXMED WILLOW AMBULATORY MEDICATION CHARGE: Performed by: NURSE PRACTITIONER

## 2024-10-10 ENCOUNTER — APPOINTMENT (OUTPATIENT)
Dept: MEDICAL GROUP | Facility: PHYSICIAN GROUP | Age: 79
End: 2024-10-10
Payer: MEDICARE

## 2024-10-16 ENCOUNTER — OFFICE VISIT (OUTPATIENT)
Dept: MEDICAL GROUP | Facility: PHYSICIAN GROUP | Age: 79
End: 2024-10-16
Payer: MEDICARE

## 2024-10-16 VITALS
TEMPERATURE: 97.3 F | HEIGHT: 62 IN | HEART RATE: 87 BPM | DIASTOLIC BLOOD PRESSURE: 70 MMHG | RESPIRATION RATE: 16 BRPM | OXYGEN SATURATION: 99 % | SYSTOLIC BLOOD PRESSURE: 118 MMHG | BODY MASS INDEX: 25.97 KG/M2

## 2024-10-16 DIAGNOSIS — M48.04 NEUROFORAMINAL STENOSIS OF THORACIC SPINE: ICD-10-CM

## 2024-10-16 DIAGNOSIS — E11.40 TYPE 2 DIABETES MELLITUS WITH DIABETIC NEUROPATHY, WITH LONG-TERM CURRENT USE OF INSULIN (HCC): ICD-10-CM

## 2024-10-16 DIAGNOSIS — M54.31 SCIATIC NERVE PAIN, RIGHT: ICD-10-CM

## 2024-10-16 DIAGNOSIS — L97.909 DM TYPE 2, CONTROLLED, WITH LOWER EXTREMITY ULCER (HCC): ICD-10-CM

## 2024-10-16 DIAGNOSIS — Z98.1 HISTORY OF LUMBAR FUSION: ICD-10-CM

## 2024-10-16 DIAGNOSIS — Z23 NEED FOR VACCINATION: ICD-10-CM

## 2024-10-16 DIAGNOSIS — Z79.4 TYPE 2 DIABETES MELLITUS WITH DIABETIC NEUROPATHY, WITH LONG-TERM CURRENT USE OF INSULIN (HCC): ICD-10-CM

## 2024-10-16 DIAGNOSIS — M48.061 SPINAL STENOSIS OF LUMBAR REGION WITHOUT NEUROGENIC CLAUDICATION: ICD-10-CM

## 2024-10-16 DIAGNOSIS — E11.622 DM TYPE 2, CONTROLLED, WITH LOWER EXTREMITY ULCER (HCC): ICD-10-CM

## 2024-10-16 PROBLEM — L97.911 NON-PRESSURE CHRONIC ULCER RIGHT LOWER LEG, LIMITED TO BREAKDOWN SKIN (HCC): Status: RESOLVED | Noted: 2024-07-31 | Resolved: 2024-10-16

## 2024-10-16 PROBLEM — T14.8XXD WOUND HEALING, DELAYED: Status: RESOLVED | Noted: 2023-11-29 | Resolved: 2024-10-16

## 2024-10-16 PROBLEM — L97.921 NON-PRESSURE CHRONIC ULCER OF LEFT LOWER LEG, LIMITED TO BREAKDOWN OF SKIN (HCC): Status: RESOLVED | Noted: 2024-07-31 | Resolved: 2024-10-16

## 2024-10-16 PROBLEM — L03.90 WOUND CELLULITIS: Status: RESOLVED | Noted: 2023-10-24 | Resolved: 2024-10-16

## 2024-10-16 PROCEDURE — 3074F SYST BP LT 130 MM HG: CPT | Performed by: NURSE PRACTITIONER

## 2024-10-16 PROCEDURE — 3078F DIAST BP <80 MM HG: CPT | Performed by: NURSE PRACTITIONER

## 2024-10-16 PROCEDURE — 90662 IIV NO PRSV INCREASED AG IM: CPT | Performed by: NURSE PRACTITIONER

## 2024-10-16 PROCEDURE — 99214 OFFICE O/P EST MOD 30 MIN: CPT | Mod: 25 | Performed by: NURSE PRACTITIONER

## 2024-10-16 PROCEDURE — 92250 FUNDUS PHOTOGRAPHY W/I&R: CPT | Performed by: NURSE PRACTITIONER

## 2024-10-16 PROCEDURE — G0008 ADMIN INFLUENZA VIRUS VAC: HCPCS | Performed by: NURSE PRACTITIONER

## 2024-10-16 RX ORDER — GABAPENTIN 100 MG/1
100 CAPSULE ORAL DAILY
Qty: 90 CAPSULE | Refills: 3 | Status: SHIPPED | OUTPATIENT
Start: 2024-10-16

## 2024-10-16 ASSESSMENT — ENCOUNTER SYMPTOMS
MYALGIAS: 1
WEAKNESS: 1
DIZZINESS: 0
HEADACHES: 0
FEVER: 0
WEIGHT LOSS: 0
SHORTNESS OF BREATH: 0
CHILLS: 0
BACK PAIN: 1

## 2024-10-17 ENCOUNTER — TELEPHONE (OUTPATIENT)
Dept: MEDICAL GROUP | Facility: PHYSICIAN GROUP | Age: 79
End: 2024-10-17
Payer: MEDICARE

## 2024-10-30 LAB — RETINAL SCREEN: POSITIVE

## 2024-10-31 DIAGNOSIS — Z79.4 TYPE 2 DIABETES MELLITUS WITH DIABETIC NEUROPATHY, WITH LONG-TERM CURRENT USE OF INSULIN (HCC): ICD-10-CM

## 2024-10-31 DIAGNOSIS — E11.39 DIABETIC EYES (HCC): ICD-10-CM

## 2024-10-31 DIAGNOSIS — E11.40 TYPE 2 DIABETES MELLITUS WITH DIABETIC NEUROPATHY, WITH LONG-TERM CURRENT USE OF INSULIN (HCC): ICD-10-CM

## 2024-11-01 ENCOUNTER — TELEPHONE (OUTPATIENT)
Dept: MEDICAL GROUP | Facility: PHYSICIAN GROUP | Age: 79
End: 2024-11-01
Payer: MEDICARE

## 2024-11-01 NOTE — TELEPHONE ENCOUNTER
Left message for patient on message below     Please update patient that week received the results of her POCT retinal exam.  It did show mild to minimal in the left and right eye nonproliferative diabetic retinopathy which is the early stages of diabetic eye disease.  I did place a new order for her to be seen by a retinal specialist so we can continue to manage this.  This can also be managed with her blood sugars.

## 2025-04-23 NOTE — ASSESSMENT & PLAN NOTE
Chronic problem.  Recent A1c is at 9 from March 2022.  Patient is seeing Madeleine santana pharmacist.  She is currently on metformin 1000 mg twice a day, Rybelsus 3 mg daily and Jardiance 25 mg daily.  Patient has an appointment with retina specialist on Friday.  Denies neuropathy.  Patient measures her BG's at home when averaging around 150.   Yes